# Patient Record
Sex: MALE | Race: BLACK OR AFRICAN AMERICAN | Employment: OTHER | URBAN - METROPOLITAN AREA
[De-identification: names, ages, dates, MRNs, and addresses within clinical notes are randomized per-mention and may not be internally consistent; named-entity substitution may affect disease eponyms.]

---

## 2017-12-30 ENCOUNTER — APPOINTMENT (OUTPATIENT)
Dept: ULTRASOUND IMAGING | Age: 77
End: 2017-12-30
Attending: EMERGENCY MEDICINE
Payer: MEDICARE

## 2017-12-30 ENCOUNTER — APPOINTMENT (OUTPATIENT)
Dept: GENERAL RADIOLOGY | Age: 77
End: 2017-12-30
Attending: EMERGENCY MEDICINE
Payer: MEDICARE

## 2017-12-30 ENCOUNTER — HOSPITAL ENCOUNTER (EMERGENCY)
Age: 77
Discharge: HOME | End: 2017-12-30
Attending: EMERGENCY MEDICINE
Payer: MEDICARE

## 2017-12-30 VITALS
BODY MASS INDEX: 25.8 KG/M2 | TEMPERATURE: 101.1 F | HEART RATE: 114 BPM | HEIGHT: 72 IN | RESPIRATION RATE: 16 BRPM | SYSTOLIC BLOOD PRESSURE: 124 MMHG | OXYGEN SATURATION: 100 % | WEIGHT: 190.48 LBS | DIASTOLIC BLOOD PRESSURE: 70 MMHG

## 2017-12-30 DIAGNOSIS — K52.9 ENTERITIS: ICD-10-CM

## 2017-12-30 DIAGNOSIS — R11.2 NON-INTRACTABLE VOMITING WITH NAUSEA, UNSPECIFIED VOMITING TYPE: Primary | ICD-10-CM

## 2017-12-30 DIAGNOSIS — R79.89 ELEVATED LFTS: ICD-10-CM

## 2017-12-30 LAB
ALBUMIN SERPL-MCNC: 3.3 G/DL (ref 3.5–5)
ALBUMIN/GLOB SERPL: 0.8 {RATIO} (ref 1.1–2.2)
ALP SERPL-CCNC: 154 U/L (ref 45–117)
ALT SERPL-CCNC: 152 U/L (ref 12–78)
ANION GAP SERPL CALC-SCNC: 7 MMOL/L (ref 5–15)
APPEARANCE UR: ABNORMAL
AST SERPL-CCNC: 319 U/L (ref 15–37)
BACTERIA URNS QL MICRO: NEGATIVE /HPF
BASOPHILS # BLD: 0 K/UL (ref 0–0.1)
BASOPHILS NFR BLD: 0 % (ref 0–1)
BILIRUB SERPL-MCNC: 0.7 MG/DL (ref 0.2–1)
BILIRUB UR QL CFM: NEGATIVE
BUN SERPL-MCNC: 19 MG/DL (ref 6–20)
BUN/CREAT SERPL: 9 (ref 12–20)
CALCIUM SERPL-MCNC: 8.9 MG/DL (ref 8.5–10.1)
CHLORIDE SERPL-SCNC: 108 MMOL/L (ref 97–108)
CK MB CFR SERPL CALC: 1.1 % (ref 0–2.5)
CK MB SERPL-MCNC: 1.8 NG/ML (ref 5–25)
CK SERPL-CCNC: 168 U/L (ref 39–308)
CO2 SERPL-SCNC: 27 MMOL/L (ref 21–32)
COLOR UR: ABNORMAL
CREAT SERPL-MCNC: 2.08 MG/DL (ref 0.7–1.3)
DIFFERENTIAL METHOD BLD: ABNORMAL
EOSINOPHIL # BLD: 0 K/UL (ref 0–0.4)
EOSINOPHIL NFR BLD: 0 % (ref 0–7)
EPITH CASTS URNS QL MICRO: ABNORMAL /LPF
ERYTHROCYTE [DISTWIDTH] IN BLOOD BY AUTOMATED COUNT: 14.4 % (ref 11.5–14.5)
GLOBULIN SER CALC-MCNC: 3.9 G/DL (ref 2–4)
GLUCOSE SERPL-MCNC: 120 MG/DL (ref 65–100)
GLUCOSE UR STRIP.AUTO-MCNC: NEGATIVE MG/DL
HCT VFR BLD AUTO: 35.2 % (ref 36.6–50.3)
HGB BLD-MCNC: 12 G/DL (ref 12.1–17)
HGB UR QL STRIP: NEGATIVE
HYALINE CASTS URNS QL MICRO: ABNORMAL /LPF (ref 0–5)
KETONES UR QL STRIP.AUTO: NEGATIVE MG/DL
LACTATE SERPL-SCNC: 0.9 MMOL/L (ref 0.4–2)
LACTATE SERPL-SCNC: 3.1 MMOL/L (ref 0.4–2)
LEUKOCYTE ESTERASE UR QL STRIP.AUTO: NEGATIVE
LIPASE SERPL-CCNC: 279 U/L (ref 73–393)
LYMPHOCYTES # BLD: 0.3 K/UL (ref 0.8–3.5)
LYMPHOCYTES NFR BLD: 4 % (ref 12–49)
MCH RBC QN AUTO: 29.9 PG (ref 26–34)
MCHC RBC AUTO-ENTMCNC: 34.1 G/DL (ref 30–36.5)
MCV RBC AUTO: 87.8 FL (ref 80–99)
MONOCYTES # BLD: 0.4 K/UL (ref 0–1)
MONOCYTES NFR BLD: 5 % (ref 5–13)
NEUTS SEG # BLD: 7.3 K/UL (ref 1.8–8)
NEUTS SEG NFR BLD: 91 % (ref 32–75)
NITRITE UR QL STRIP.AUTO: NEGATIVE
PH UR STRIP: 7 [PH] (ref 5–8)
PLATELET # BLD AUTO: 192 K/UL (ref 150–400)
POTASSIUM SERPL-SCNC: 4.2 MMOL/L (ref 3.5–5.1)
PROT SERPL-MCNC: 7.2 G/DL (ref 6.4–8.2)
PROT UR STRIP-MCNC: 30 MG/DL
RBC # BLD AUTO: 4.01 M/UL (ref 4.1–5.7)
RBC #/AREA URNS HPF: ABNORMAL /HPF (ref 0–5)
RBC MORPH BLD: ABNORMAL
SODIUM SERPL-SCNC: 142 MMOL/L (ref 136–145)
SP GR UR REFRACTOMETRY: 1.02 (ref 1–1.03)
TROPONIN I SERPL-MCNC: <0.04 NG/ML
UA: UC IF INDICATED,UAUC: ABNORMAL
UROBILINOGEN UR QL STRIP.AUTO: 1 EU/DL (ref 0.2–1)
WBC # BLD AUTO: 8 K/UL (ref 4.1–11.1)
WBC URNS QL MICRO: ABNORMAL /HPF (ref 0–4)

## 2017-12-30 PROCEDURE — 99285 EMERGENCY DEPT VISIT HI MDM: CPT

## 2017-12-30 PROCEDURE — 87040 BLOOD CULTURE FOR BACTERIA: CPT | Performed by: EMERGENCY MEDICINE

## 2017-12-30 PROCEDURE — 93005 ELECTROCARDIOGRAM TRACING: CPT

## 2017-12-30 PROCEDURE — 83605 ASSAY OF LACTIC ACID: CPT | Performed by: EMERGENCY MEDICINE

## 2017-12-30 PROCEDURE — 76705 ECHO EXAM OF ABDOMEN: CPT

## 2017-12-30 PROCEDURE — 96361 HYDRATE IV INFUSION ADD-ON: CPT

## 2017-12-30 PROCEDURE — 36415 COLL VENOUS BLD VENIPUNCTURE: CPT | Performed by: EMERGENCY MEDICINE

## 2017-12-30 PROCEDURE — 84484 ASSAY OF TROPONIN QUANT: CPT | Performed by: EMERGENCY MEDICINE

## 2017-12-30 PROCEDURE — 87077 CULTURE AEROBIC IDENTIFY: CPT | Performed by: EMERGENCY MEDICINE

## 2017-12-30 PROCEDURE — 80053 COMPREHEN METABOLIC PANEL: CPT | Performed by: EMERGENCY MEDICINE

## 2017-12-30 PROCEDURE — 83690 ASSAY OF LIPASE: CPT | Performed by: EMERGENCY MEDICINE

## 2017-12-30 PROCEDURE — 96374 THER/PROPH/DIAG INJ IV PUSH: CPT

## 2017-12-30 PROCEDURE — 81001 URINALYSIS AUTO W/SCOPE: CPT | Performed by: EMERGENCY MEDICINE

## 2017-12-30 PROCEDURE — 74011250636 HC RX REV CODE- 250/636: Performed by: EMERGENCY MEDICINE

## 2017-12-30 PROCEDURE — 82550 ASSAY OF CK (CPK): CPT | Performed by: EMERGENCY MEDICINE

## 2017-12-30 PROCEDURE — 85025 COMPLETE CBC W/AUTO DIFF WBC: CPT | Performed by: EMERGENCY MEDICINE

## 2017-12-30 PROCEDURE — 71010 XR CHEST PORT: CPT

## 2017-12-30 PROCEDURE — 87186 SC STD MICRODIL/AGAR DIL: CPT | Performed by: EMERGENCY MEDICINE

## 2017-12-30 PROCEDURE — 74011250637 HC RX REV CODE- 250/637: Performed by: EMERGENCY MEDICINE

## 2017-12-30 RX ORDER — LEVOFLOXACIN 500 MG/1
500 TABLET, FILM COATED ORAL
Status: COMPLETED | OUTPATIENT
Start: 2017-12-30 | End: 2017-12-30

## 2017-12-30 RX ORDER — CITALOPRAM 10 MG/1
10 TABLET ORAL EVERY EVENING
Status: ON HOLD | COMMUNITY
End: 2021-01-01 | Stop reason: SDUPTHER

## 2017-12-30 RX ORDER — ONDANSETRON 2 MG/ML
4 INJECTION INTRAMUSCULAR; INTRAVENOUS
Status: COMPLETED | OUTPATIENT
Start: 2017-12-30 | End: 2017-12-30

## 2017-12-30 RX ORDER — METRONIDAZOLE 250 MG/1
500 TABLET ORAL
Status: COMPLETED | OUTPATIENT
Start: 2017-12-30 | End: 2017-12-30

## 2017-12-30 RX ORDER — SODIUM CHLORIDE 9 MG/ML
1500 INJECTION, SOLUTION INTRAVENOUS CONTINUOUS
Status: DISCONTINUED | OUTPATIENT
Start: 2017-12-30 | End: 2017-12-30

## 2017-12-30 RX ORDER — RANITIDINE 150 MG/1
150 TABLET, FILM COATED ORAL DAILY
Status: ON HOLD | COMMUNITY
End: 2021-01-01

## 2017-12-30 RX ORDER — ACETAMINOPHEN 500 MG
1000 TABLET ORAL ONCE
Status: COMPLETED | OUTPATIENT
Start: 2017-12-30 | End: 2017-12-30

## 2017-12-30 RX ORDER — ONDANSETRON 4 MG/1
4 TABLET, ORALLY DISINTEGRATING ORAL
Qty: 20 TAB | Refills: 0 | Status: SHIPPED | OUTPATIENT
Start: 2017-12-30 | End: 2019-05-05

## 2017-12-30 RX ORDER — SODIUM CHLORIDE 9 MG/ML
1500 INJECTION, SOLUTION INTRAVENOUS ONCE
Status: DISCONTINUED | OUTPATIENT
Start: 2017-12-30 | End: 2017-12-31 | Stop reason: HOSPADM

## 2017-12-30 RX ORDER — CIPROFLOXACIN 250 MG/1
250 TABLET, FILM COATED ORAL EVERY 12 HOURS
Qty: 20 TAB | Refills: 0 | Status: SHIPPED | OUTPATIENT
Start: 2017-12-30 | End: 2019-05-05

## 2017-12-30 RX ORDER — SODIUM CHLORIDE 0.9 % (FLUSH) 0.9 %
5-10 SYRINGE (ML) INJECTION AS NEEDED
Status: DISCONTINUED | OUTPATIENT
Start: 2017-12-30 | End: 2017-12-31 | Stop reason: HOSPADM

## 2017-12-30 RX ORDER — METRONIDAZOLE 500 MG/1
500 TABLET ORAL 2 TIMES DAILY
Qty: 20 TAB | Refills: 0 | Status: SHIPPED | OUTPATIENT
Start: 2017-12-30 | End: 2018-01-09

## 2017-12-30 RX ORDER — CARBIDOPA AND LEVODOPA 50; 200 MG/1; MG/1
1 TABLET, EXTENDED RELEASE ORAL DAILY
COMMUNITY
End: 2019-05-10

## 2017-12-30 RX ADMIN — SODIUM CHLORIDE 1000 ML: 900 INJECTION, SOLUTION INTRAVENOUS at 16:43

## 2017-12-30 RX ADMIN — LEVOFLOXACIN 500 MG: 500 TABLET, FILM COATED ORAL at 21:44

## 2017-12-30 RX ADMIN — ACETAMINOPHEN 1000 MG: 500 TABLET ORAL at 17:07

## 2017-12-30 RX ADMIN — ONDANSETRON 4 MG: 2 INJECTION INTRAMUSCULAR; INTRAVENOUS at 17:07

## 2017-12-30 RX ADMIN — SODIUM CHLORIDE 1500 ML: 900 INJECTION, SOLUTION INTRAVENOUS at 18:57

## 2017-12-30 RX ADMIN — METRONIDAZOLE 500 MG: 250 TABLET ORAL at 21:44

## 2017-12-30 NOTE — ED PROVIDER NOTES
EMERGENCY DEPARTMENT HISTORY AND PHYSICAL EXAM      Date: 12/30/2017  Patient Name: José Miguel Blanco    History of Presenting Illness     Chief Complaint   Patient presents with    Nausea     pt reports he has been nauseated and woke up with a upset stomach, denies diarrhea, reports he gagged a few times. History Provided By: Patient and Patient's Wife    HPI: José Miguel Blanco, 69 y.o. male with PMHx significant for Parkinson's, CAD, and stroke presents to the ED with cc of abd pain on waking this morning. Pt report also reports nausea, decreased appetite, body tremors, dysuria, and difficulty urinating. Pt's wife reports pt had a fever after arriving to the ED. Pt states he does not currently have any abd pain. He denies receiving the flu shot this season. Pt denies cough, congestion, sore throat, SOB, rash, diarrhea, or constipation. He denies PMhx of UTI or abd issues. PCP: Carlota Black MD  Urologist: Adam Parnell M.D    There are no other complaints, changes, or physical findings at this time. Current Facility-Administered Medications   Medication Dose Route Frequency Provider Last Rate Last Dose    sodium chloride (NS) flush 5-10 mL  5-10 mL IntraVENous PRN Geryl Cynthia, DO        0.9% sodium chloride infusion 1,500 mL  1,500 mL IntraVENous ONCE Geryl Cynthia, DO        metroNIDAZOLE (FLAGYL) tablet 500 mg  500 mg Oral NOW Geryl Cynthia, DO        levoFLOXacin [facility]) tablet 500 mg  500 mg Oral NOW Geryl Cynthia, DO         Current Outpatient Prescriptions   Medication Sig Dispense Refill    carbidopa-levodopa ER (SINEMET CR)  mg per tablet Take 1 Tab by mouth two (2) times a day.  citalopram (CELEXA) 10 mg tablet Take  by mouth daily.  raNITIdine (ZANTAC) 150 mg tablet Take 150 mg by mouth nightly.  ciprofloxacin HCl (CIPRO) 250 mg tablet Take 1 Tab by mouth every twelve (12) hours.  20 Tab 0    metroNIDAZOLE (FLAGYL) 500 mg tablet Take 1 Tab by mouth two (2) times a day for 10 days. 20 Tab 0    ondansetron (ZOFRAN ODT) 4 mg disintegrating tablet Take 1 Tab by mouth every eight (8) hours as needed for Nausea. 20 Tab 0    amLODIPine (NORVASC) 10 mg tablet Take  by mouth daily.  metoprolol succinate (TOPROL-XL) 100 mg XL tablet Take  by mouth daily.  pravastatin (PRAVACHOL) 20 mg tablet Take 20 mg by mouth nightly.  dipyridamole-aspirin (AGGRENOX) 200-25 mg per SR capsule Take 1 Cap by mouth two (2) times a day.  aspirin 81 mg chewable tablet Take 81 mg by mouth daily.  donepezil (ARICEPT) 10 mg tablet Take 10 mg by mouth nightly. Past History     Past Medical History:  Past Medical History:   Diagnosis Date    CAD (coronary artery disease)     Hypertension     Other ill-defined conditions     parkinson's    Other ill-defined conditions     high cholesterol    Stroke ([facility] [address] 2006    left sided weakness       Past Surgical History:  Past Surgical History:   Procedure Laterality Date    COLONOSCOPY,DIAGNOSTIC  5/12/2014         HX CATARACT REMOVAL Bilateral     HX HEART CATHETERIZATION  2006       Family History:  No family history on file. Social History:  Social History   Substance Use Topics    Smoking status: Former Smoker    Smokeless tobacco: Not on file    Alcohol use No       Allergies:  No Known Allergies      Review of Systems   Review of Systems   Constitutional: Positive for appetite change (decrease) and fever. Negative for chills and fatigue. HENT: Negative. Negative for congestion, rhinorrhea, sinus pressure and sore throat. Eyes: Negative. Respiratory: Negative. Negative for cough, choking, chest tightness, shortness of breath and wheezing. Cardiovascular: Negative. Negative for chest pain, palpitations and leg swelling. Gastrointestinal: Positive for abdominal pain (now resolved) and nausea. Negative for constipation, diarrhea and vomiting. Endocrine: Negative.     Genitourinary: Positive for difficulty urinating and dysuria. Negative for flank pain and urgency. Musculoskeletal: Negative. Skin: Negative. Neurological: Positive for tremors. Negative for dizziness, speech difficulty, weakness, light-headedness, numbness and headaches. Psychiatric/Behavioral: Negative. All other systems reviewed and are negative. Physical Exam   Physical Exam   Constitutional: He is oriented to person, place, and time. He appears well-developed and well-nourished. No distress. Thin, elderly male, NAD   HENT:   Head: Normocephalic and atraumatic. Mouth/Throat: Oropharynx is clear and moist. No oropharyngeal exudate. Eyes: Conjunctivae and EOM are normal. Pupils are equal, round, and reactive to light. Neck: Normal range of motion. Neck supple. No JVD present. No tracheal deviation present. Cardiovascular: Normal rate, regular rhythm, normal heart sounds and intact distal pulses. No murmur heard. Pulmonary/Chest: Effort normal and breath sounds normal. No stridor. No respiratory distress. He has no wheezes. He has no rales. He exhibits no tenderness. Abdominal: Soft. He exhibits no distension. There is tenderness (mild periumbilical). There is no rebound and no guarding. Neq McBurney; neg Rovsing, no tenderness of the RUQ   Musculoskeletal: Normal range of motion. He exhibits no edema or tenderness. Neurological: He is alert and oriented to person, place, and time. No cranial nerve deficit. No gross motor or sensory deficits    Skin: Skin is warm and dry. He is not diaphoretic. Psychiatric: He has a normal mood and affect. His behavior is normal.   Nursing note and vitals reviewed.         Diagnostic Study Results     Labs -     Recent Results (from the past 12 hour(s))   EKG, 12 LEAD, INITIAL    Collection Time: 12/30/17  4:34 PM   Result Value Ref Range    Ventricular Rate 101 BPM    Atrial Rate 101 BPM    P-R Interval 172 ms    QRS Duration 84 ms    Q-T Interval 320 ms    QTC Calculation (Bezet) 414 ms    Calculated P Axis 51 degrees    Calculated R Axis 8 degrees    Calculated T Axis 135 degrees    Diagnosis       Sinus tachycardia  Possible Left atrial enlargement  T wave abnormality, consider inferior ischemia  T wave abnormality, consider anterolateral ischemia  Abnormal ECG  No previous ECGs available     LACTIC ACID    Collection Time: 12/30/17  4:38 PM   Result Value Ref Range    Lactic acid 3.1 (HH) 0.4 - 2.0 MMOL/L   METABOLIC PANEL, COMPREHENSIVE    Collection Time: 12/30/17  4:38 PM   Result Value Ref Range    Sodium 142 136 - 145 mmol/L    Potassium 4.2 3.5 - 5.1 mmol/L    Chloride 108 97 - 108 mmol/L    CO2 27 21 - 32 mmol/L    Anion gap 7 5 - 15 mmol/L    Glucose 120 (H) 65 - 100 mg/dL    BUN 19 6 - 20 MG/DL    Creatinine 2.08 (H) 0.70 - 1.30 MG/DL    BUN/Creatinine ratio 9 (L) 12 - 20      GFR est AA 38 (L) >60 ml/min/1.73m2    GFR est non-AA 31 (L) >60 ml/min/1.73m2    Calcium 8.9 8.5 - 10.1 MG/DL    Bilirubin, total 0.7 0.2 - 1.0 MG/DL    ALT (SGPT) 152 (H) 12 - 78 U/L    AST (SGOT) 319 (H) 15 - 37 U/L    Alk. phosphatase 154 (H) 45 - 117 U/L    Protein, total 7.2 6.4 - 8.2 g/dL    Albumin 3.3 (L) 3.5 - 5.0 g/dL    Globulin 3.9 2.0 - 4.0 g/dL    A-G Ratio 0.8 (L) 1.1 - 2.2     CBC WITH AUTOMATED DIFF    Collection Time: 12/30/17  4:38 PM   Result Value Ref Range    WBC 8.0 4.1 - 11.1 K/uL    RBC 4.01 (L) 4.10 - 5.70 M/uL    HGB 12.0 (L) 12.1 - 17.0 g/dL    HCT 35.2 (L) 36.6 - 50.3 %    MCV 87.8 80.0 - 99.0 FL    MCH 29.9 26.0 - 34.0 PG    MCHC 34.1 30.0 - 36.5 g/dL    RDW 14.4 11.5 - 14.5 %    PLATELET 459 080 - 918 K/uL    NEUTROPHILS 91 (H) 32 - 75 %    LYMPHOCYTES 4 (L) 12 - 49 %    MONOCYTES 5 5 - 13 %    EOSINOPHILS 0 0 - 7 %    BASOPHILS 0 0 - 1 %    ABS. NEUTROPHILS 7.3 1.8 - 8.0 K/UL    ABS. LYMPHOCYTES 0.3 (L) 0.8 - 3.5 K/UL    ABS. MONOCYTES 0.4 0.0 - 1.0 K/UL    ABS. EOSINOPHILS 0.0 0.0 - 0.4 K/UL    ABS.  BASOPHILS 0.0 0.0 - 0.1 K/UL    DF SMEAR SCANNED RBC COMMENTS NORMOCYTIC, NORMOCHROMIC     LIPASE    Collection Time: 12/30/17  4:38 PM   Result Value Ref Range    Lipase 279 73 - 393 U/L   CK W/ CKMB & INDEX    Collection Time: 12/30/17  4:38 PM   Result Value Ref Range     39 - 308 U/L    CK - MB 1.8 <3.6 NG/ML    CK-MB Index 1.1 0 - 2.5     TROPONIN I    Collection Time: 12/30/17  4:38 PM   Result Value Ref Range    Troponin-I, Qt. <0.04 <0.05 ng/mL   URINALYSIS W/ REFLEX CULTURE    Collection Time: 12/30/17  5:54 PM   Result Value Ref Range    Color YELLOW/STRAW      Appearance CLOUDY (A) CLEAR      Specific gravity 1.018 1.003 - 1.030      pH (UA) 7.0 5.0 - 8.0      Protein 30 (A) NEG mg/dL    Glucose NEGATIVE  NEG mg/dL    Ketone NEGATIVE  NEG mg/dL    Blood NEGATIVE  NEG      Urobilinogen 1.0 0.2 - 1.0 EU/dL    Nitrites NEGATIVE  NEG      Leukocyte Esterase NEGATIVE  NEG      WBC 0-4 0 - 4 /hpf    RBC 0-5 0 - 5 /hpf    Epithelial cells FEW FEW /lpf    Bacteria NEGATIVE  NEG /hpf    UA:UC IF INDICATED CULTURE NOT INDICATED BY UA RESULT CNI      Hyaline cast 0-2 0 - 5 /lpf   BILIRUBIN, CONFIRM    Collection Time: 12/30/17  5:54 PM   Result Value Ref Range    Bilirubin UA, confirm NEGATIVE  NEG     LACTIC ACID    Collection Time: 12/30/17  8:29 PM   Result Value Ref Range    Lactic acid 0.9 0.4 - 2.0 MMOL/L       Radiologic Studies -   CXR Results  (Last 48 hours)               12/30/17 1725  XR CHEST PORT Final result    Impression:  IMPRESSION: No airspace disease or other acute abnormality. Narrative:  EXAM:  XR CHEST PORT. INDICATION: Sepsis. COMPARISON: None. FINDINGS:    A portable AP radiograph of the chest was obtained at 1706 hours. Lines and tubes: None. Lungs: The lungs are clear. Pleura: There are some right pleural calcifications. There is no pleural   effusion or pneumothorax. Mediastinum: The cardiac and mediastinal contours and pulmonary vascularity are   normal.   Bones and soft tissues:  The bones and soft tissues are grossly within normal   limits. Study Result      ULTRASOUND OF ABDOMEN, 12/30/2017 7:57 PM  INDICATION:   Additional history: Pain, nausea, elevated LFTs, fever. COMPARISON: None  . TECHNIQUE: Multiplanar real-time ultrasonography of the abdomen using gray-scale  imaging, supplemented by color and spectral Doppler. Thong Wheeler FINDINGS:  Liver: Normal contour without visible focal lesions. The liver echotexture is  normal. Antegrade flow in the portal vein with normal waveform. Gallbladder: No stones. Possible minimal degree of pericholecystic fluid,  nonspecific No sonographic Vargas's sign. Biliary: No intra- or extra-hepatic ductal dilatation. Common bile duct measures  0.5 cm. Pancreas: The visualized pancreas is unremarkable. Right kidney: Normal size, contour and echogenicity without masses, stones,  perinephric fluid, or hydronephrosis. Right kidney measures 8.8 cm. Vascular: The proximal aorta and IVC are unremarkable. Thong Wheeler IMPRESSION  IMPRESSION:    1. Possible minimal degree of pericholecystic fluid which is a nonspecific  finding. Medical Decision Making   I am the first provider for this patient. I reviewed the vital signs, available nursing notes, past medical history, past surgical history, family history and social history. Vital Signs-Reviewed the patient's vital signs.   Patient Vitals for the past 12 hrs:   Temp Pulse Resp BP SpO2   12/30/17 2045 - - - 127/67 98 %   12/30/17 2030 - - - 135/66 100 %   12/30/17 2015 - - - 138/73 100 %   12/30/17 2002 - - - 131/66 -   12/30/17 1930 - - - 129/65 100 %   12/30/17 1915 - - - 135/74 100 %   12/30/17 1900 - - - 126/61 99 %   12/30/17 1845 - - - 124/63 99 %   12/30/17 1830 - - - 125/59 100 %   12/30/17 1815 - - - 125/64 100 %   12/30/17 1800 - - - 132/62 100 %   12/30/17 1745 - - - 122/64 99 %   12/30/17 1730 - - - 155/73 100 %   12/30/17 1715 - - - 141/65 100 %   12/30/17 1700 - - - 139/60 100 %   12/30/17 9302 - - - 139/65 100 %   12/30/17 1630 - - - 128/62 100 %   12/30/17 1628 - - - - 100 %   12/30/17 1618 - - - - 100 %   12/30/17 1615 - - - 138/72 100 %   12/30/17 1607 - - - 150/78 -   12/30/17 1602 (!) 101.1 °F (38.4 °C) (!) 114 16 150/78 100 %       EKG interpretation: (Preliminary)  Sinus tach, rate 103, normal axis/pr/ rsDiffuse t wave inversion, Derinda DO Miranda      Records Reviewed: Old Medical Records    Provider Notes (Medical Decision Making):   DDx: enteritis, PNA, UTI, cholecystitis    ED Course:   Initial assessment performed. The patients presenting problems have been discussed, and they are in agreement with the care plan formulated and outlined with them. I have encouraged them to ask questions as they arise throughout their visit. Progress Note:  8:50 PM  Pt reports no tenderness over gallbladder or abd. Will ambulate pt. If he ambulates fine, will consult with general surgery and discharge home with antibiotics and a follow up with PCP. Consult Note:  9:25 PM  Juan Brothers DO spoke with Kerwin Leblanc MD  Specialty: General Surgery  Discussed pt's hx, disposition, and available diagnostic and imaging results. Dr. Mark Raza states that fluid around pt's gallbladder is not likely related to the gallbladder given findings. He believes itis likely due  to pt's enteritis. He states if pt can tolerate PO, pt can be discharged home. 9:30  Pt tolerated PO. His lactic is back to 0.9      Disposition:  Discharge Note:  9:33 PM  The pt is ready for discharge. The pt's signs, symptoms, diagnosis, and discharge instructions have been discussed and pt has conveyed their understanding. The pt is to follow up as recommended or return to ER should their symptoms worsen. Plan has been discussed and pt is in agreement. PLAN:  1. Current Discharge Medication List      START taking these medications    Details   ciprofloxacin HCl (CIPRO) 250 mg tablet Take 1 Tab by mouth every twelve (12) hours.   Qty: 20 Tab, Refills: 0      metroNIDAZOLE (FLAGYL) 500 mg tablet Take 1 Tab by mouth two (2) times a day for 10 days. Qty: 20 Tab, Refills: 0      ondansetron (ZOFRAN ODT) 4 mg disintegrating tablet Take 1 Tab by mouth every eight (8) hours as needed for Nausea. Qty: 20 Tab, Refills: 0           2. Follow-up Information     Follow up With Details Comments [address], [place] In 1 week Repeat Labs [address]  [phone]      [facility] EMERGENCY DEPT  If symptoms worsen [address] [place] [address]  [phone]        Return to ED if worse     Diagnosis     Clinical Impression:   1. Non-intractable vomiting with nausea, unspecified vomiting type    2. Elevated LFTs    3. Enteritis        Attestations: This note is prepared by Jacqulynn Boeck, acting as a Scribe for Hellen Miller, [facility], DO: The scribe's documentation has been prepared under my direction and personally reviewed by me in its entirety. I confirm that the notes above accurately reflects all work, treatment, procedures, and medical decision making performed by me.

## 2017-12-30 NOTE — ED NOTES
Family members at bedside. Reports pt woke up this morning with pain in ABD that subsided. Pt then was shaking all over lasting 15-20 minutes.

## 2017-12-30 NOTE — ED TRIAGE NOTES
Received care of pt from EMS. Pt is alert and oriented x 4. Denies pain. Woke up this morning with upset stomach, reports he gagged afew times but not fully vomited. Denies nausea at time. Denies CP or ABD pain.

## 2017-12-31 LAB
ATRIAL RATE: 101 BPM
CALCULATED P AXIS, ECG09: 51 DEGREES
CALCULATED R AXIS, ECG10: 8 DEGREES
CALCULATED T AXIS, ECG11: 135 DEGREES
DIAGNOSIS, 93000: NORMAL
P-R INTERVAL, ECG05: 172 MS
Q-T INTERVAL, ECG07: 320 MS
QRS DURATION, ECG06: 84 MS
QTC CALCULATION (BEZET), ECG08: 414 MS
VENTRICULAR RATE, ECG03: 101 BPM

## 2017-12-31 NOTE — ED NOTES
Bedside and Verbal shift change received from [facility], RN. Report included the following information: SBAR, ED Summary, MAR and Recent Results.

## 2017-12-31 NOTE — DISCHARGE INSTRUCTIONS
Gastroenteritis: Care Instructions  Your Care Instructions    Gastroenteritis is an illness that may cause nausea, vomiting, and diarrhea. It is sometimes called \"stomach flu. \" It can be caused by bacteria or a virus. You will probably begin to feel better in 1 to 2 days. In the meantime, get plenty of rest and make sure you do not become dehydrated. Dehydration occurs when your body loses too much fluid. Follow-up care is a key part of your treatment and safety. Be sure to make and go to all appointments, and call your doctor if you are having problems. It's also a good idea to know your test results and keep a list of the medicines you take. How can you care for yourself at home? · If your doctor prescribed antibiotics, take them as directed. Do not stop taking them just because you feel better. You need to take the full course of antibiotics. · Drink plenty of fluids to prevent dehydration, enough so that your urine is light yellow or clear like water. Choose water and other caffeine-free clear liquids until you feel better. If you have kidney, heart, or liver disease and have to limit fluids, talk with your doctor before you increase your fluid intake. · Drink fluids slowly, in frequent, small amounts, because drinking too much too fast can cause vomiting. · Begin eating mild foods, such as dry toast, yogurt, applesauce, bananas, and rice. Avoid spicy, hot, or high-fat foods, and do not drink alcohol or caffeine for a day or two. Do not drink milk or eat ice cream until you are feeling better. How to prevent gastroenteritis  · Keep hot foods hot and cold foods cold. · Do not eat meats, dressings, salads, or other foods that have been kept at room temperature for more than 2 hours. · Use a thermometer to check your refrigerator. It should be between 34°F and 40°F.  · Defrost meats in the refrigerator or microwave, not on the kitchen counter. · Keep your hands and your kitchen clean.  Wash your hands, cutting boards, and countertops with hot soapy water frequently. · Cook meat until it is well done. · Do not eat raw eggs or uncooked sauces made with raw eggs. · Do not take chances. If food looks or tastes spoiled, throw it out. When should you call for help? Call 911 anytime you think you may need emergency care. For example, call if:  ? · You vomit blood or what looks like coffee grounds. ? · You passed out (lost consciousness). ? · You pass maroon or very bloody stools. ?Call your doctor now or seek immediate medical care if:  ? · You have severe belly pain. ? · You have signs of needing more fluids. You have sunken eyes, a dry mouth, and pass only a little dark urine. ? · You feel like you are going to faint. ? · You have increased belly pain that does not go away in 1 to 2 days. ? · You have new or increased nausea, or you are vomiting. ? · You have a new or higher fever. ? · Your stools are black and tarlike or have streaks of blood. ? Watch closely for changes in your health, and be sure to contact your doctor if:  ? · You are dizzy or lightheaded. ? · You urinate less than usual, or your urine is dark yellow or brown. ? · You do not feel better with each day that goes by. Where can you learn more? Go to http://lorenzo-sofia.info/. Enter N142 in the search box to learn more about \"Gastroenteritis: Care Instructions. \"  Current as of: March 3, 2017  Content Version: 11.4  © 2286-6859 [employer]. Care instructions adapted under license by [employer] (which disclaims liability or warranty for this information). If you have questions about a medical condition or this instruction, always ask your healthcare professional. [address] any warranty or liability for your use of this information.

## 2017-12-31 NOTE — ED NOTES
Discharge instructions reviewed with patient. Discharge instructions given to patient per Dr. Merlene Acosta. Patient able to return/verbalize discharge instructions. Copy of discharge instructions provided. Patient condition stable, respiratory status within normal limits, neuro status intact. Escorted by wheelchair out of ER, accompanied by family.

## 2017-12-31 NOTE — ED NOTES
Bedside shift change report given to John Craig 45 (oncoming nurse) by Brandi Cueva RN (offgoing nurse). Report included the following information SBAR, Kardex, ED Summary and MAR.

## 2017-12-31 NOTE — PROGRESS NOTES
Pt discharged on Cipro and Flagyl. Only growing in 1 of 2 bottles.   Final results and sensitivities pending

## 2018-01-05 LAB
BACTERIA SPEC CULT: ABNORMAL
BACTERIA SPEC CULT: ABNORMAL
BACTERIA SPEC CULT: NORMAL
SERVICE CMNT-IMP: ABNORMAL
SERVICE CMNT-IMP: NORMAL

## 2018-01-05 NOTE — PROGRESS NOTES
Attempted to call pt and left HIPAA compliant VM requesting a return call. Growth in only 1 bottle since 12/30/2017. Susceptibility report shows that bacteria susceptible to cipro.    Nii Live PA-C

## 2018-01-06 NOTE — PROGRESS NOTES
Suspect contaminant, as only grown in 1 bottle. If patient returns call and is symptomatic, will offer Cipro.

## 2018-02-10 ENCOUNTER — HOSPITAL ENCOUNTER (EMERGENCY)
Age: 78
Discharge: HOME | End: 2018-02-10
Attending: EMERGENCY MEDICINE
Payer: MEDICARE

## 2018-02-10 ENCOUNTER — APPOINTMENT (OUTPATIENT)
Dept: GENERAL RADIOLOGY | Age: 78
End: 2018-02-10
Attending: PHYSICIAN ASSISTANT
Payer: MEDICARE

## 2018-02-10 VITALS
BODY MASS INDEX: 23.05 KG/M2 | RESPIRATION RATE: 14 BRPM | HEART RATE: 71 BPM | WEIGHT: 170.19 LBS | DIASTOLIC BLOOD PRESSURE: 97 MMHG | OXYGEN SATURATION: 98 % | HEIGHT: 72 IN | TEMPERATURE: 98.4 F | SYSTOLIC BLOOD PRESSURE: 173 MMHG

## 2018-02-10 DIAGNOSIS — R10.12 ABDOMINAL PAIN, LUQ (LEFT UPPER QUADRANT): Primary | ICD-10-CM

## 2018-02-10 LAB
APPEARANCE UR: CLEAR
BACTERIA URNS QL MICRO: NEGATIVE /HPF
BILIRUB UR QL CFM: NEGATIVE
COLOR UR: ABNORMAL
EPITH CASTS URNS QL MICRO: ABNORMAL /LPF
GLUCOSE UR STRIP.AUTO-MCNC: NEGATIVE MG/DL
HGB UR QL STRIP: NEGATIVE
HYALINE CASTS URNS QL MICRO: ABNORMAL /LPF (ref 0–5)
KETONES UR QL STRIP.AUTO: NEGATIVE MG/DL
LEUKOCYTE ESTERASE UR QL STRIP.AUTO: ABNORMAL
NITRITE UR QL STRIP.AUTO: NEGATIVE
PH UR STRIP: 7.5 [PH] (ref 5–8)
PROT UR STRIP-MCNC: 30 MG/DL
RBC #/AREA URNS HPF: ABNORMAL /HPF (ref 0–5)
SP GR UR REFRACTOMETRY: 1.02 (ref 1–1.03)
UA: UC IF INDICATED,UAUC: ABNORMAL
UROBILINOGEN UR QL STRIP.AUTO: 0.2 EU/DL (ref 0.2–1)
WBC URNS QL MICRO: ABNORMAL /HPF (ref 0–4)

## 2018-02-10 PROCEDURE — 81001 URINALYSIS AUTO W/SCOPE: CPT | Performed by: PHYSICIAN ASSISTANT

## 2018-02-10 PROCEDURE — 71046 X-RAY EXAM CHEST 2 VIEWS: CPT

## 2018-02-10 PROCEDURE — 74018 RADEX ABDOMEN 1 VIEW: CPT

## 2018-02-10 PROCEDURE — 99283 EMERGENCY DEPT VISIT LOW MDM: CPT

## 2018-02-10 RX ORDER — POLYETHYLENE GLYCOL 3350 17 G/17G
17 POWDER, FOR SOLUTION ORAL DAILY
Qty: 238 G | Refills: 0 | Status: SHIPPED | OUTPATIENT
Start: 2018-02-10 | End: 2019-05-05 | Stop reason: DRUGHIGH

## 2018-02-10 NOTE — DISCHARGE INSTRUCTIONS
Abdominal Pain: Care Instructions  Your Care Instructions    Abdominal pain has many possible causes. Some aren't serious and get better on their own in a few days. Others need more testing and treatment. If your pain continues or gets worse, you need to be rechecked and may need more tests to find out what is wrong. You may need surgery to correct the problem. Don't ignore new symptoms, such as fever, nausea and vomiting, urination problems, pain that gets worse, and dizziness. These may be signs of a more serious problem. Your doctor may have recommended a follow-up visit in the next 8 to 12 hours. If you are not getting better, you may need more tests or treatment. The doctor has checked you carefully, but problems can develop later. If you notice any problems or new symptoms, get medical treatment right away. Follow-up care is a key part of your treatment and safety. Be sure to make and go to all appointments, and call your doctor if you are having problems. It's also a good idea to know your test results and keep a list of the medicines you take. How can you care for yourself at home? · Rest until you feel better. · To prevent dehydration, drink plenty of fluids, enough so that your urine is light yellow or clear like water. Choose water and other caffeine-free clear liquids until you feel better. If you have kidney, heart, or liver disease and have to limit fluids, talk with your doctor before you increase the amount of fluids you drink. · If your stomach is upset, eat mild foods, such as rice, dry toast or crackers, bananas, and applesauce. Try eating several small meals instead of two or three large ones. · Wait until 48 hours after all symptoms have gone away before you have spicy foods, alcohol, and drinks that contain caffeine. · Do not eat foods that are high in fat. · Avoid anti-inflammatory medicines such as aspirin, ibuprofen (Advil, Motrin), and naproxen (Aleve).  These can cause stomach upset. Talk to your doctor if you take daily aspirin for another health problem. When should you call for help? Call 911 anytime you think you may need emergency care. For example, call if:  ? · You passed out (lost consciousness). ? · You pass maroon or very bloody stools. ? · You vomit blood or what looks like coffee grounds. ? · You have new, severe belly pain. ?Call your doctor now or seek immediate medical care if:  ? · Your pain gets worse, especially if it becomes focused in one area of your belly. ? · You have a new or higher fever. ? · Your stools are black and look like tar, or they have streaks of blood. ? · You have unexpected vaginal bleeding. ? · You have symptoms of a urinary tract infection. These may include:  ¨ Pain when you urinate. ¨ Urinating more often than usual.  ¨ Blood in your urine. ? · You are dizzy or lightheaded, or you feel like you may faint. ? Watch closely for changes in your health, and be sure to contact your doctor if:  ? · You are not getting better after 1 day (24 hours). Where can you learn more? Go to http://lorenzo-sofia.info/. Enter N366 in the search box to learn more about \"Abdominal Pain: Care Instructions. \"  Current as of: March 20, 2017  Content Version: 11.4  © 2369-6255 [employer]. Care instructions adapted under license by [employer] (which disclaims liability or warranty for this information). If you have questions about a medical condition or this instruction, always ask your healthcare professional. [place] [address] any warranty or liability for your use of this information.

## 2018-02-10 NOTE — ED PROVIDER NOTES
EMERGENCY DEPARTMENT HISTORY AND PHYSICAL EXAM      Date: 2/10/2018  Patient Name: Su Barron    History of Presenting Illness     Chief Complaint   Patient presents with    Abdominal Pain     Ambulatory into the ED with c/o Lt side pain and constipation x several days. Denies n/v/d and urinary symptoms.  Constipation       History Provided By: Patient and Patient's Wife    HPI: Su Barron, 69 y.o. male with PMHx significant for CAD, HTN, Parkinson's, presents ambulatory to the ED with cc of a sharp L upper abdominal pain x 2 days. Family reports associated symptom of constpation for 1 week. Pt notes his last BM was yesterday, but describes it as hard. Family member reports the pt's pain would \"ease up some. \" Pt also complains of nausea but notes it is due to \"too much medication. \" Wife notes the pt has not been eating recently due to his h/o Parkinson's disease and she also reports his urine is discolored. Pt denies any blood in his stool, urinary discomfort, vomiting, SOB, coughing, or headache. Wife denies any recent falls or h/o blood clots. She denies the pt seeing a neurologist.    PCP: Hazle Hammans, MD    There are no other complaints, changes, or physical findings at this time. Current Outpatient Prescriptions   Medication Sig Dispense Refill    polyethylene glycol (MIRALAX) 17 gram/dose powder Take 17 g by mouth daily. 1 tablespoon with 8 oz of water daily 238 g 0    carbidopa-levodopa ER (SINEMET CR)  mg per tablet Take 1 Tab by mouth two (2) times a day.  citalopram (CELEXA) 10 mg tablet Take  by mouth daily.  raNITIdine (ZANTAC) 150 mg tablet Take 150 mg by mouth nightly.  ciprofloxacin HCl (CIPRO) 250 mg tablet Take 1 Tab by mouth every twelve (12) hours. 20 Tab 0    ondansetron (ZOFRAN ODT) 4 mg disintegrating tablet Take 1 Tab by mouth every eight (8) hours as needed for Nausea. 20 Tab 0    amLODIPine (NORVASC) 10 mg tablet Take  by mouth daily.       metoprolol succinate (TOPROL-XL) 100 mg XL tablet Take  by mouth daily.  pravastatin (PRAVACHOL) 20 mg tablet Take 20 mg by mouth nightly.  donepezil (ARICEPT) 10 mg tablet Take 10 mg by mouth nightly.  dipyridamole-aspirin (AGGRENOX) 200-25 mg per SR capsule Take 1 Cap by mouth two (2) times a day.  aspirin 81 mg chewable tablet Take 81 mg by mouth daily. Past History     Past Medical History:  Past Medical History:   Diagnosis Date    CAD (coronary artery disease)     Hypertension     Other ill-defined conditions     parkinson's    Other ill-defined conditions     high cholesterol    Stroke ([facility] [address] 2006    left sided weakness       Past Surgical History:  Past Surgical History:   Procedure Laterality Date    COLONOSCOPY,DIAGNOSTIC  5/12/2014         HX CATARACT REMOVAL Bilateral     HX HEART CATHETERIZATION  2006       Family History:  No family history on file. Social History:  Social History   Substance Use Topics    Smoking status: Former Smoker    Smokeless tobacco: Not on file    Alcohol use No       Allergies:  No Known Allergies      Review of Systems   Review of Systems   Constitutional: Negative for chills and fever. HENT: Negative for facial swelling. Eyes: Negative for photophobia and visual disturbance. Respiratory: Negative for cough and shortness of breath. Cardiovascular: Negative for chest pain. Gastrointestinal: Positive for abdominal pain (+LUQ), constipation and nausea. Negative for blood in stool and vomiting. Genitourinary: Negative for dysuria and flank pain. Skin: Negative for color change, pallor, rash and wound. Neurological: Negative for dizziness, weakness, light-headedness and headaches. All other systems reviewed and are negative. Physical Exam   Physical Exam   Constitutional: He is oriented to person, place, and time. He appears well-developed and well-nourished. No distress.    HENT:   Head: Normocephalic and atraumatic. Eyes: Conjunctivae are normal.   Cardiovascular: Normal rate, regular rhythm and normal heart sounds. Pulmonary/Chest: Effort normal and breath sounds normal. No respiratory distress. Abdominal: Soft. Bowel sounds are normal. There is tenderness. Mild amount of LUQ tenderness   Musculoskeletal: Normal range of motion. TTP L lower ribs, no swelling or erythema   Neurological: He is alert and oriented to person, place, and time. No cranial nerve deficit. Skin: Skin is warm. No rash noted. Psychiatric: He has a normal mood and affect. His behavior is normal.   Nursing note and vitals reviewed. Diagnostic Study Results     Labs -  Recent Results (from the past 12 hour(s))   URINALYSIS W/ REFLEX CULTURE    Collection Time: 02/10/18 11:33 AM   Result Value Ref Range    Color YELLOW/STRAW      Appearance CLEAR CLEAR      Specific gravity 1.023 1.003 - 1.030      pH (UA) 7.5 5.0 - 8.0      Protein 30 (A) NEG mg/dL    Glucose NEGATIVE  NEG mg/dL    Ketone NEGATIVE  NEG mg/dL    Blood NEGATIVE  NEG      Urobilinogen 0.2 0.2 - 1.0 EU/dL    Nitrites NEGATIVE  NEG      Leukocyte Esterase TRACE (A) NEG      WBC 0-4 0 - 4 /hpf    RBC 0-5 0 - 5 /hpf    Epithelial cells FEW FEW /lpf    Bacteria NEGATIVE  NEG /hpf    UA:UC IF INDICATED CULTURE NOT INDICATED BY UA RESULT CNI      Hyaline cast 0-2 0 - 5 /lpf   BILIRUBIN, CONFIRM    Collection Time: 02/10/18 11:33 AM   Result Value Ref Range    Bilirubin UA, confirm NEGATIVE  NEG         Radiologic Studies -   XR ABD (KUB)   Final Result   CLINICAL HISTORY: Left upper quadrant pain     INDICATION: Upper quadrant pain     COMPARISON: None  FINDINGS:   Single AP view of the abdomen are obtained. There is no organomegaly abnormal  mass or calcification. No obstruction. No free intraperitoneal air.     IMPRESSION  IMPRESSION:  No obstruction or free intraperitoneal air.      CXR Results  (Last 48 hours)               02/10/18 1056  XR CHEST PA LAT Final result Impression:  IMPRESSION: No acute intrathoracic disease. Narrative:  CLINICAL HISTORY: Rib pain    INDICATION: Rib pain       COMPARISON: 2008       FINDINGS:    PA and lateral views of the chest are obtained. The cardiopericardial silhouette is within normal limits. There is no pleural   effusion, pneumothorax or focal consolidation present. Chronic pleural and   parenchymal changes with calcific densities adjacent to the pleural margin on   the right. On the compression deformities in the midthoracic spine. Medical Decision Making   I am the first provider for this patient. I reviewed the vital signs, available nursing notes, past medical history, past surgical history, family history and social history. Vital Signs-Reviewed the patient's vital signs. Patient Vitals for the past 12 hrs:   Temp Pulse Resp BP SpO2   02/10/18 0936 98.4 °F (36.9 °C) 71 14 (!) 173/97 98 %     Records Reviewed: Nursing Notes and Old Medical Records    Provider Notes (Medical Decision Making):   DDx: UTI, constipation, costochondritis, PTX    ED Course:   Initial assessment performed. The patients presenting problems have been discussed, and they are in agreement with the care plan formulated and outlined with them. I have encouraged them to ask questions as they arise throughout their visit. PROGRESS NOTE:  11:58 AM  Pt has been re-evaluated. Pt was updated on his results. He conveys his understanding of these results. Disposition:  12:02 PM  Discussed lab and imaging results with pt along with dx and treatment plan. Discussed importance of  neurology and PCP follow up. All questions answered. Pt voiced they understood. Return if sx worsen. PLAN:  1. Discharge  Current Discharge Medication List      START taking these medications    Details   polyethylene glycol (MIRALAX) 17 gram/dose powder Take 17 g by mouth daily.  1 tablespoon with 8 oz of water daily  Qty: 238 g, Refills: 0 2.   Follow-up Information     Follow up With Details Comments Contact Info    Lakshmi Hooper MD Schedule an appointment as soon as possible for a visit in 1 day for parkinsons follow up [place] [address]      Oneyda Steiner MD Schedule an appointment as soon as possible for a visit As needed [address] [facility],[phone]          Return to ED if worse     Diagnosis     Clinical Impression:   1. Abdominal pain, LUQ (left upper quadrant)        Attestations: This note is prepared by Tali Gee, acting as Scribe for [employer] [place] [employer]. AYE Oneal: The scribe's documentation has been prepared under my direction and personally reviewed by me in its entirety. I confirm that the note above accurately reflects all work, treatment, procedures, and medical decision making performed by me.

## 2018-04-09 ENCOUNTER — ANESTHESIA EVENT (OUTPATIENT)
Dept: ENDOSCOPY | Age: 78
End: 2018-04-09
Payer: MEDICARE

## 2018-04-09 ENCOUNTER — HOSPITAL ENCOUNTER (OUTPATIENT)
Age: 78
Setting detail: OUTPATIENT SURGERY
Discharge: HOME | End: 2018-04-09
Attending: INTERNAL MEDICINE | Admitting: INTERNAL MEDICINE
Payer: MEDICARE

## 2018-04-09 ENCOUNTER — ANESTHESIA (OUTPATIENT)
Dept: ENDOSCOPY | Age: 78
End: 2018-04-09
Payer: MEDICARE

## 2018-04-09 VITALS
HEART RATE: 75 BPM | DIASTOLIC BLOOD PRESSURE: 99 MMHG | WEIGHT: 170 LBS | HEIGHT: 72 IN | RESPIRATION RATE: 12 BRPM | SYSTOLIC BLOOD PRESSURE: 180 MMHG | TEMPERATURE: 98.4 F | BODY MASS INDEX: 23.03 KG/M2 | OXYGEN SATURATION: 96 %

## 2018-04-09 PROCEDURE — 77030019988 HC FCPS ENDOSC DISP BSC -B: Performed by: INTERNAL MEDICINE

## 2018-04-09 PROCEDURE — 77030018712 HC DEV BLLN INFL BSC -B: Performed by: INTERNAL MEDICINE

## 2018-04-09 PROCEDURE — 76040000019: Performed by: INTERNAL MEDICINE

## 2018-04-09 PROCEDURE — 88305 TISSUE EXAM BY PATHOLOGIST: CPT | Performed by: INTERNAL MEDICINE

## 2018-04-09 PROCEDURE — 74011000250 HC RX REV CODE- 250

## 2018-04-09 PROCEDURE — 74011250636 HC RX REV CODE- 250/636

## 2018-04-09 PROCEDURE — 74011250636 HC RX REV CODE- 250/636: Performed by: INTERNAL MEDICINE

## 2018-04-09 PROCEDURE — C1726 CATH, BAL DIL, NON-VASCULAR: HCPCS | Performed by: INTERNAL MEDICINE

## 2018-04-09 PROCEDURE — 76060000031 HC ANESTHESIA FIRST 0.5 HR: Performed by: INTERNAL MEDICINE

## 2018-04-09 RX ORDER — SODIUM CHLORIDE 9 MG/ML
50 INJECTION, SOLUTION INTRAVENOUS CONTINUOUS
Status: DISCONTINUED | OUTPATIENT
Start: 2018-04-09 | End: 2018-04-09 | Stop reason: HOSPADM

## 2018-04-09 RX ORDER — PROPOFOL 10 MG/ML
INJECTION, EMULSION INTRAVENOUS AS NEEDED
Status: DISCONTINUED | OUTPATIENT
Start: 2018-04-09 | End: 2018-04-09 | Stop reason: HOSPADM

## 2018-04-09 RX ORDER — LIDOCAINE HYDROCHLORIDE 20 MG/ML
INJECTION, SOLUTION EPIDURAL; INFILTRATION; INTRACAUDAL; PERINEURAL AS NEEDED
Status: DISCONTINUED | OUTPATIENT
Start: 2018-04-09 | End: 2018-04-09 | Stop reason: HOSPADM

## 2018-04-09 RX ORDER — SODIUM CHLORIDE 0.9 % (FLUSH) 0.9 %
5-10 SYRINGE (ML) INJECTION EVERY 8 HOURS
Status: DISCONTINUED | OUTPATIENT
Start: 2018-04-09 | End: 2018-04-09 | Stop reason: HOSPADM

## 2018-04-09 RX ORDER — SODIUM CHLORIDE 0.9 % (FLUSH) 0.9 %
5-10 SYRINGE (ML) INJECTION AS NEEDED
Status: DISCONTINUED | OUTPATIENT
Start: 2018-04-09 | End: 2018-04-09 | Stop reason: HOSPADM

## 2018-04-09 RX ORDER — MIDAZOLAM HYDROCHLORIDE 1 MG/ML
1-2 INJECTION, SOLUTION INTRAMUSCULAR; INTRAVENOUS
Status: DISCONTINUED | OUTPATIENT
Start: 2018-04-09 | End: 2018-04-09 | Stop reason: HOSPADM

## 2018-04-09 RX ADMIN — SODIUM CHLORIDE 50 ML/HR: 900 INJECTION, SOLUTION INTRAVENOUS at 08:36

## 2018-04-09 RX ADMIN — LIDOCAINE HYDROCHLORIDE 100 MG: 20 INJECTION, SOLUTION EPIDURAL; INFILTRATION; INTRACAUDAL; PERINEURAL at 08:47

## 2018-04-09 RX ADMIN — PROPOFOL 160 MG: 10 INJECTION, EMULSION INTRAVENOUS at 09:01

## 2018-04-09 NOTE — PROCEDURES
Endoscopic Gastroduodenoscopy Procedure Note  Thomas Ellis  1940  [id number]      Procedure: Endoscopic Gastroduodenoscopy with biopsy, esophageal dilation    Indication:  Dysphagia/odynophagia    Pre-operative Diagnosis: see indication above    Post-operative Diagnosis: see findings below    :  Christi Root MD    Referring Provider:  Carri Almendarez    Anethesia/Sedation:  MAC anesthesia Propofol    Pre-Procedural Exam:      Airway: clear, Malimpati 2   Heart: RRR, without gallops or rubs  Lungs: clear bilaterally without wheezes, crackles, or rhonchi  Abdomen: soft, nontender, nondistended, bowel sounds present        Procedure Details     After infom consent was obtained for the procedure, with all risks and benefits of procedure explained the patient was taken to the endoscopy suite and placed in the left lateral decubitus position. Following sequential administration of sedation as per above, the [id number] gastroscope was inserted into the mouth and advanced under direct vision to second portion of the duodenum. A careful inspection was made as the gastroscope was withdrawn, including a retroflexed view of the proximal stomach; findings and interventions are described below. Findings/Impression: ESOPHAGUS: The esophagus is normal. The proximal, mid, and distal portions are normal. The Z-Line is intact. STOMACH: The fundus on antegrade and retroflex views is normal. The body and antrum have chronic gastritis. pylorus is normal.   DUODENUM: The bulb and second portions are normal.    Therapies:  esophageal dilation with 20mm sized balloon  biopsy of stomach body    Specimens: stomach          Complications:   None; patient tolerated the procedure well. EBL:  None. Recommendations:  -Await pathology. , -Follow symptoms. , -Follow up with primary care physician, -esoph manometry if dysphagia persists.   It could be muscular with Parkinsons    Discharge Disposition:

## 2018-04-09 NOTE — H&P
Gastroenterology History and Physical    Patient: Mason Joe    Physician: Da Webster MD    Vital Signs: Blood pressure 161/80, pulse 73, temperature 98.4 °F (36.9 °C), resp. rate 12, height 6' (1.829 m), weight 77.1 kg (170 lb), SpO2 98 %. Allergies: No Known Allergies    Indication: dysphagia    History:  Past Medical History:   Diagnosis Date    CAD (coronary artery disease)     Hypertension     Other ill-defined conditions(799.89)     parkinson's    Other ill-defined conditions(799.89)     high cholesterol    Stroke ([facility] [address] 2006    left sided weakness      Past Surgical History:   Procedure Laterality Date    HX CATARACT REMOVAL Bilateral     HX HEART CATHETERIZATION  2006    MD COLONOSCOPY FLX DX W/COLLJ SPEC WHEN PFRMD  5/12/2014           Social History     Social History    Marital status: SINGLE     Spouse name: N/A    Number of children: N/A    Years of education: N/A     Social History Main Topics    Smoking status: Former Smoker    Smokeless tobacco: None    Alcohol use No    Drug use: None    Sexual activity: Not Asked     Other Topics Concern    None     Social History Narrative    History reviewed. No pertinent family history. Medications:   Prior to Admission medications    Medication Sig Start Date End Date Taking? Authorizing Provider   polyethylene glycol (MIRALAX) 17 gram/dose powder Take 17 g by mouth daily. 1 tablespoon with 8 oz of water daily 2/10/18  Yes ROXIE Rolon   carbidopa-levodopa ER (SINEMET CR)  mg per tablet Take 1 Tab by mouth two (2) times a day. Yes Sima Moeller MD   raNITIdine (ZANTAC) 150 mg tablet Take 150 mg by mouth nightly. Yes Sima Moeller MD   ciprofloxacin HCl (CIPRO) 250 mg tablet Take 1 Tab by mouth every twelve (12) hours. 12/30/17  Yes [facility], DO   ondansetron (ZOFRAN ODT) 4 mg disintegrating tablet Take 1 Tab by mouth every eight (8) hours as needed for Nausea.  12/30/17  Yes [facility], DO   amLODIPine (NORVASC) 10 mg tablet Take  by mouth daily. Yes Historical Provider   metoprolol succinate (TOPROL-XL) 100 mg XL tablet Take  by mouth daily. Yes Historical Provider   pravastatin (PRAVACHOL) 20 mg tablet Take 20 mg by mouth nightly. Yes Historical Provider   donepezil (ARICEPT) 10 mg tablet Take 10 mg by mouth nightly. Yes Historical Provider   dipyridamole-aspirin (AGGRENOX) 200-25 mg per SR capsule Take 1 Cap by mouth two (2) times a day. Yes Historical Provider   aspirin 81 mg chewable tablet Take 81 mg by mouth daily. Yes Historical Provider   citalopram (CELEXA) 10 mg tablet Take  by mouth daily. Sima Moeller, MD       Physical Exam:   HEENT: Head: Normocephalic, no lesions, without obvious abnormality.    Heart: regular rate and rhythm, S1, S2 normal, no murmur, click, rub or gallop   Lungs: chest clear, no wheezing, rales, normal symmetric air entry, Heart exam - S1, S2 normal, no murmur, no gallop, rate regular   Abdominal: Bowel sounds are normal, liver is not enlarged, spleen is not enlarged     Signed:  Rema Gatica MD 4/9/2018

## 2018-04-09 NOTE — DISCHARGE INSTRUCTIONS
Erika Graft  [id number]  1940    EGD DISCHARGE INSTRUCTIONS  Discomfort:  Sore throat- throat lozenges or warm salt water gargle  redness at IV site- apply warm compress to area; if redness or soreness persist- contact your physician  Gaseous discomfort- walking, belching will help relieve any discomfort  You may not operate a vehicle for 12 hours  You may not engage in an occupation involving machinery or appliances for rest of today  You may not drink alcoholic beverages for at least 12 hours  Avoid making any critical decisions for at least 24 hour  DIET  You may resume your regular diet    ACTIVITY  Spend the remainder of the day resting -  avoid any strenuous activity. CALL M.D. ANY SIGN OF   Increasing pain, nausea, vomiting  Abdominal distension (swelling)  New increased bleeding (oral or rectal)  Fever (chills)  Pain in chest area  Bloody discharge from nose or mouth  Shortness of breath    Follow-up Instructions:   Call Jenn Knee  Biopsy results in 10 days  Telephone # [phone]  Brief Findings:   No narrowing but esophagus was stretched. DISCHARGE SUMMARY from Nurse    The following personal items collected during your admission are returned to you:   Dental Appliance: Dental Appliances: Uppers  Vision: Visual Aid: None  Hearing Aid:    Jewelry:    Clothing:    Other Valuables:    Valuables sent to safe: [employer]    Thank you for requesting access to [facility]. Please follow the instructions below to securely access and download your online medical record. [facility] allows you to send messages to your doctor, view your test results, renew your prescriptions, schedule appointments, and more. How Do I Sign Up? 1. In your internet browser, go to [email or web address]  2. Click on the First Time User? Click Here link in the Sign In box. You will be redirect to the New Member Sign Up page. 3. Enter your [facility] Access Code exactly as it appears below.  You will not need to use this code after youve completed the sign-up process. If you do not sign up before the expiration date, you must request a new code. [employer] Access Code: [email or web address]  Expires: 2018  7:49 AM (This is the date your [employer] access code will )    4. Enter the last four digits of your Social Security Number (xxxx) and Date of Birth (mm/dd/yyyy) as indicated and click Submit. You will be taken to the next sign-up page. 5. Create a [employer] ID. This will be your [employer] login ID and cannot be changed, so think of one that is secure and easy to remember. 6. Create a [employer] password. You can change your password at any time. 7. Enter your Password Reset Question and Answer. This can be used at a later time if you forget your password. 8. Enter your e-mail address. You will receive e-mail notification when new information is available in [address]. 9. Click Sign Up. You can now view and download portions of your medical record. 10. Click the Download Summary menu link to download a portable copy of your medical information. Additional Information    If you have questions, please visit the Frequently Asked Questions section of the [employer] website at [email or web address] Remember, [employer] is NOT to be used for urgent needs. For medical emergencies, dial 911.

## 2018-04-09 NOTE — IP AVS SNAPSHOT
[address] [facility] 
[phone] Patient: Florin Fragoso MRN: [id number] XCZ:[birth year] About your hospitalization You were admitted on:  April 9, 2018 You last received care in the:  [facility] ENDOSCOPY You were discharged on:  April 9, 2018 Why you were hospitalized Your primary diagnosis was:  Not on File Follow-up Information None Discharge Orders None A check wanda indicates which time of day the medication should be taken. My Medications CONTINUE taking these medications Instructions Each Dose to Equal  
 Morning Noon Evening Bedtime AGGRENOX  mg per SR capsule Generic drug:  aspirin-dipyridamole Your last dose was: Your next dose is: Take 1 Cap by mouth two (2) times a day. 1 Cap  
    
   
   
   
  
 amLODIPine 10 mg tablet Commonly known as:  Rony Valdovinos Your last dose was: Your next dose is: Take  by mouth daily. aspirin 81 mg chewable tablet Your last dose was: Your next dose is: Take 81 mg by mouth daily. 81 mg  
    
   
   
   
  
 carbidopa-levodopa ER  mg per tablet Commonly known as:  SINEMET CR Your last dose was: Your next dose is: Take 1 Tab by mouth two (2) times a day. 1 Tab CeleXA 10 mg tablet Generic drug:  citalopram  
   
Your last dose was: Your next dose is: Take  by mouth daily. ciprofloxacin HCl 250 mg tablet Commonly known as:  CIPRO Your last dose was: Your next dose is: Take 1 Tab by mouth every twelve (12) hours. 250 mg  
    
   
   
   
  
 donepezil 10 mg tablet Commonly known as:  ARICEPT Your last dose was: Your next dose is: Take 10 mg by mouth nightly. 10 mg  
    
   
   
   
  
 metoprolol succinate 100 mg tablet Commonly known as:  TOPROL-XL Your last dose was: Your next dose is: Take  by mouth daily. ondansetron 4 mg disintegrating tablet Commonly known as:  ZOFRAN ODT Your last dose was: Your next dose is: Take 1 Tab by mouth every eight (8) hours as needed for Nausea. 4 mg  
    
   
   
   
  
 polyethylene glycol 17 gram/dose powder Commonly known as:  Reece Taylor Your last dose was: Your next dose is: Take 17 g by mouth daily. 1 tablespoon with 8 oz of water daily 17 g  
    
   
   
   
  
 pravastatin 20 mg tablet Commonly known as:  PRAVACHOL Your last dose was: Your next dose is: Take 20 mg by mouth nightly. 20 mg  
    
   
   
   
  
 raNITIdine 150 mg tablet Commonly known as:  ZANTAC Your last dose was: Your next dose is: Take 150 mg by mouth nightly. 150 mg Discharge Instructions Destiny Lee [id number] 
1940 EGD DISCHARGE INSTRUCTIONS Discomfort: 
Sore throat- throat lozenges or warm salt water gargle 
redness at IV site- apply warm compress to area; if redness or soreness persist- contact your physician Gaseous discomfort- walking, belching will help relieve any discomfort You may not operate a vehicle for 12 hours You may not engage in an occupation involving machinery or appliances for rest of today You may not drink alcoholic beverages for at least 12 hours Avoid making any critical decisions for at least 24 hour DIET You may resume your regular diet ACTIVITY Spend the remainder of the day resting -  avoid any strenuous activity. CALL M.D. ANY SIGN OF Increasing pain, nausea, vomiting Abdominal distension (swelling) New increased bleeding (oral or rectal) Fever (chills) Pain in chest area Bloody discharge from nose or mouth Shortness of breath Follow-up Instructions: 
 Call Shruthi Nichole Biopsy results in 10 days Telephone # [phone] Brief Findings:   No narrowing but esophagus was stretched. DISCHARGE SUMMARY from Nurse The following personal items collected during your admission are returned to you:  
Dental Appliance: Dental Appliances: Uppers Vision: Visual Aid: None Hearing Aid:   
Jewelry:   
Clothing:   
Other Valuables:   
Valuables sent to safe: MyChart Activation Thank you for requesting access to [employer]. Please follow the instructions below to securely access and download your online medical record. [employer] allows you to send messages to your doctor, view your test results, renew your prescriptions, schedule appointments, and more. How Do I Sign Up? 1. In your internet browser, go to [email or web address] 
2. Click on the First Time User? Click Here link in the Sign In box. You will be redirect to the New Member Sign Up page. 3. Enter your [employer] Access Code exactly as it appears below. You will not need to use this code after youve completed the sign-up process. If you do not sign up before the expiration date, you must request a new code. [employer] Access Code: [email or web address] Expires: 2018  7:49 AM (This is the date your [employer] access code will ) 4. Enter the last four digits of your Social Security Number (xxxx) and Date of Birth (mm/dd/yyyy) as indicated and click Submit. You will be taken to the next sign-up page. 5. Create a [employer] ID. This will be your [employer] login ID and cannot be changed, so think of one that is secure and easy to remember. 6. Create a [employer] password. You can change your password at any time. 7. Enter your Password Reset Question and Answer. This can be used at a later time if you forget your password. 8. Enter your e-mail address.  You will receive e-mail notification when new information is available in [employer]. 9. Click Sign Up. You can now view and download portions of your medical record. 10. Click the Download Summary menu link to download a portable copy of your medical information. Additional Information If you have questions, please visit the Frequently Asked Questions section of the [employer]No[employer] website at [email or web address] Remember, DormNoise is NOT to be used for urgent needs. For medical emergencies, dial 911. Introducing [facility]! [facility] introduces [employer]ormNoise patient portal. Now you can access parts of your medical record, email your doctor's office, and request medication refills online. 1. In your internet browser, go to [email or web address] 2. Click on the First Time User? Click Here link in the Sign In box. You will see the New Member Sign Up page. 3. Enter your DormNoise Access Code exactly as it appears below. You will not need to use this code after youve completed the sign-up process. If you do not sign up before the expiration date, you must request a new code. · [employer]ormNoise Access Code: [email or web address] Expires: 7/8/2018  7:49 AM 
 
4. Enter the last four digits of your Social Security Number (xxxx) and Date of Birth (mm/dd/yyyy) as indicated and click Submit. You will be taken to the next sign-up page. 5. Create a DormNoise ID. This will be your DormNoise login ID and cannot be changed, so think of one that is secure and easy to remember. 6. Create a DormNoise password. You can change your password at any time. 7. Enter your Password Reset Question and Answer. This can be used at a later time if you forget your password. 8. Enter your e-mail address. You will receive e-mail notification when new information is available in [address] 9. Click Sign Up. You can now view and download portions of your medical record.  
10. Click the Download Summary menu link to download a portable copy of your medical information. If you have questions, please visit the Frequently Asked Questions section of the Eyenalyzehart website. Remember, [employer]el is NOT to be used for urgent needs. For medical emergencies, dial 911. Now available from your iPhone and Android! Introducing Lázaro Warner As a [employer] patient, I wanted to make you aware of our electronic visit tool called Lázaro Warner. [employer] 24/7 allows you to connect within minutes with a medical provider 24 hours a day, seven days a week via a mobile device or tablet or logging into a secure website from your computer. You can access Lázaro Warner from anywhere in the [place]. A virtual visit might be right for you when you have a simple condition and feel like you just dont want to get out of bed, or cant get away from work for an appointment, when your regular [employer] provider is not available (evenings, weekends or holidays), or when youre out of town and need minor care. Electronic visits cost only $49 and if the [employer] 24/7 provider determines a prescription is needed to treat your condition, one can be electronically transmitted to a nearby pharmacy*. Please take a moment to enroll today if you have not already done so. The enrollment process is free and takes just a few minutes. To enroll, please download the [employer] 24/7 ariadna to your tablet or phone, or visit [email or web address] to enroll on your computer. And, as an [address] [place] [address] patient with a [facility] [employer] account, the results of your visits will be scanned into your electronic medical record and your primary care provider will be able to view the scanned results. We urge you to continue to see your regular [employer] provider for your ongoing medical care.   And while your primary care provider may not be the one available when you seek a Lázaro Warner virtual visit, the peace of mind you get from getting a real diagnosis real time can be priceless. For more information on Lázaro Warner, view our Frequently Asked Questions (FAQs) at [email or web address]. Sincerely, 
 
Carlos Merrill MD 
Chief Medical Officer [address] *:  certain medications cannot be prescribed via Lázaro Warner Providers Seen During Your Hospitalization Provider Specialty Primary office phone Layla Roa MD Gastroenterology [phone] Your Primary Care Physician (PCP) Primary Care Physician Office Phone Office Fax Coy Alban [phone] You are allergic to the following No active allergies Recent Documentation Height Weight BMI Smoking Status 1.829 m 77.1 kg 23.06 kg/m2 Former Smoker Emergency Contacts Name Discharge Info Relation Home Work Mobile Rudi Cos DISCHARGE CAREGIVER [3] Friend [5]   [phone] Patient Belongings The following personal items are in your possession at time of discharge: 
  Dental Appliances: Uppers  Visual Aid: None Please provide this summary of care documentation to your next provider. Signatures-by signing, you are acknowledging that this After Visit Summary has been reviewed with you and you have received a copy. Patient Signature:  ____________________________________________________________ Date:  ____________________________________________________________  
  
[facility] Provider Signature:  ____________________________________________________________ Date:  ____________________________________________________________

## 2018-04-09 NOTE — ROUTINE PROCESS
Anesthesia reports 160mg Propofol, 100mg Lidocaine and 400mL NS given during procedure. Received report from anesthesia staff on vital signs and status of patient. Endoscope was pre-cleaned at the bedside immediately following procedure by Raudel Burch .

## 2018-04-09 NOTE — ANESTHESIA PREPROCEDURE EVALUATION
Anesthetic History   No history of anesthetic complications            Review of Systems / Medical History  Patient summary reviewed, nursing notes reviewed and pertinent labs reviewed    Pulmonary  Within defined limits                 Neuro/Psych       CVA (left side weakness)  Neuromuscular disease (Parkinson's) and TIA     Cardiovascular    Hypertension: well controlled          CAD (fair daily activity) and hyperlipidemia    Exercise tolerance: <4 METS  Comments: 12-30-17 EKG: ST, LAE, T wave abnormality    Denies chest pain or hrt probs   GI/Hepatic/Renal  Within defined limits             Comments: Difficulty swallowing Endo/Other  Within defined limits           Other Findings            Physical Exam    Airway  Mallampati: II  TM Distance: > 6 cm  Neck ROM: normal range of motion   Mouth opening: Normal     Cardiovascular  Regular rate and rhythm,  S1 and S2 normal,  no murmur, click, rub, or gallop  Rhythm: regular  Rate: normal         Dental    Dentition: Upper partial plate  Comments: Lower missing teeth   Pulmonary  Breath sounds clear to auscultation               Abdominal  GI exam deferred       Other Findings            Anesthetic Plan    ASA: 3  Anesthesia type: total IV anesthesia          Induction: Intravenous  Anesthetic plan and risks discussed with: Patient      Did NOT take beta blker this am   152/85 BP  HR 75

## 2018-04-09 NOTE — PERIOP NOTES
Destiny Lee  1940  [id number]    Situation:  Verbal report received from: ROSI Alatorre RN  Procedure: Procedure(s):  ESOPHAGOGASTRODUODENOSCOPY (EGD)  ESOPHAGEAL DILATION  ESOPHAGOGASTRODUODENAL (EGD) BIOPSY    Background:    Preoperative diagnosis: difficulty swallowing  Postoperative diagnosis: EGD:Chronic gastritis, dysphasia    :  Dr. Maria Luisa Kessler  Assistant(s): Endoscopy Technician-1: Kvng Sauceda  Endoscopy RN-1: Elaine Villa RN    Specimens:   ID Type Source Tests Collected by Time Destination   1 : Gastric Bx Preservative   Brian Wyatt MD 4/9/2018 0814 Pathology     H. Pylori  no    Assessment:  Intra-procedure medications   Anesthesia gave intra-procedure sedation and medications, see anesthesia flow sheet yes    Intravenous fluids: NS@ KVO     Vital signs stable     Abdominal assessment: round and soft     Recommendation:  Discharge patient per MD order.      Friend   Permission to share finding with family or friend yes

## 2018-04-09 NOTE — ANESTHESIA POSTPROCEDURE EVALUATION
Post-Anesthesia Evaluation and Assessment    Patient: Destiny Lee MRN: [id number]  SSN: [id number]    YOB: 1940  Age: 69 y.o. Sex: male       Cardiovascular Function/Vital Signs  Visit Vitals    BP (!) 180/99    Pulse 75    Temp 36.9 °C (98.4 °F)    Resp 12    Ht 6' (1.829 m)    Wt 77.1 kg (170 lb)    SpO2 96%    BMI 23.06 kg/m2       Patient is status post total IV anesthesia anesthesia for Procedure(s):  ESOPHAGOGASTRODUODENOSCOPY (EGD)  ESOPHAGEAL DILATION  ESOPHAGOGASTRODUODENAL (EGD) BIOPSY. Nausea/Vomiting: None    Postoperative hydration reviewed and adequate. Pain:  Pain Scale 1: Numeric (0 - 10) (04/09/18 0924)  Pain Intensity 1: 0 (04/09/18 0924)   Managed    Neurological Status: At baseline    Mental Status and Level of Consciousness: Arousable    Pulmonary Status:   O2 Device: Room air (04/09/18 0924)   Adequate oxygenation and airway patent    Complications related to anesthesia: None    Post-anesthesia assessment completed.  No concerns    Signed By: Silva Welch DO     April 9, 2018

## 2019-05-05 ENCOUNTER — HOSPITAL ENCOUNTER (INPATIENT)
Age: 79
LOS: 4 days | Discharge: NURSING, REHAB OR LONG-TERM CARE | DRG: 065 | End: 2019-05-10
Attending: EMERGENCY MEDICINE | Admitting: INTERNAL MEDICINE
Payer: MEDICARE

## 2019-05-05 ENCOUNTER — APPOINTMENT (OUTPATIENT)
Dept: GENERAL RADIOLOGY | Age: 79
DRG: 065 | End: 2019-05-05
Attending: EMERGENCY MEDICINE
Payer: MEDICARE

## 2019-05-05 ENCOUNTER — APPOINTMENT (OUTPATIENT)
Dept: CT IMAGING | Age: 79
DRG: 065 | End: 2019-05-05
Attending: EMERGENCY MEDICINE
Payer: MEDICARE

## 2019-05-05 DIAGNOSIS — I67.1 CEREBRAL ANEURYSM WITHOUT RUPTURE: ICD-10-CM

## 2019-05-05 DIAGNOSIS — G30.1 LATE ONSET ALZHEIMER'S DISEASE WITHOUT BEHAVIORAL DISTURBANCE (HCC): ICD-10-CM

## 2019-05-05 DIAGNOSIS — H53.462 LEFT HOMONYMOUS HEMIANOPSIA DUE TO RECENT CEREBRAL INFARCTION: ICD-10-CM

## 2019-05-05 DIAGNOSIS — G20 PARKINSON'S DISEASE (TREMOR, STIFFNESS, SLOW MOTION, UNSTABLE POSTURE) (HCC): ICD-10-CM

## 2019-05-05 DIAGNOSIS — I63.311 THROMBOTIC STROKE INVOLVING RIGHT MIDDLE CEREBRAL ARTERY (HCC): ICD-10-CM

## 2019-05-05 DIAGNOSIS — I63.9 ACUTE ISCHEMIC STROKE (HCC): Primary | ICD-10-CM

## 2019-05-05 DIAGNOSIS — F02.80 LATE ONSET ALZHEIMER'S DISEASE WITHOUT BEHAVIORAL DISTURBANCE (HCC): ICD-10-CM

## 2019-05-05 DIAGNOSIS — I69.398 LEFT HOMONYMOUS HEMIANOPSIA DUE TO RECENT CEREBRAL INFARCTION: ICD-10-CM

## 2019-05-05 DIAGNOSIS — I65.23 BILATERAL CAROTID ARTERY STENOSIS: ICD-10-CM

## 2019-05-05 DIAGNOSIS — G45.9 TIA (TRANSIENT ISCHEMIC ATTACK): ICD-10-CM

## 2019-05-05 LAB
ANION GAP SERPL CALC-SCNC: 6 MMOL/L (ref 5–15)
APPEARANCE UR: CLEAR
BACTERIA URNS QL MICRO: NEGATIVE /HPF
BASOPHILS # BLD: 0 K/UL (ref 0–0.1)
BASOPHILS NFR BLD: 0 % (ref 0–1)
BILIRUB UR QL: NEGATIVE
BUN SERPL-MCNC: 21 MG/DL (ref 6–20)
BUN/CREAT SERPL: 12 (ref 12–20)
CALCIUM SERPL-MCNC: 9.2 MG/DL (ref 8.5–10.1)
CHLORIDE SERPL-SCNC: 109 MMOL/L (ref 97–108)
CO2 SERPL-SCNC: 25 MMOL/L (ref 21–32)
COLOR UR: ABNORMAL
CREAT SERPL-MCNC: 1.79 MG/DL (ref 0.7–1.3)
DIFFERENTIAL METHOD BLD: ABNORMAL
EOSINOPHIL # BLD: 0.1 K/UL (ref 0–0.4)
EOSINOPHIL NFR BLD: 1 % (ref 0–7)
EPITH CASTS URNS QL MICRO: ABNORMAL /LPF
ERYTHROCYTE [DISTWIDTH] IN BLOOD BY AUTOMATED COUNT: 13.3 % (ref 11.5–14.5)
GLUCOSE BLD STRIP.AUTO-MCNC: 212 MG/DL (ref 65–100)
GLUCOSE SERPL-MCNC: 177 MG/DL (ref 65–100)
GLUCOSE UR STRIP.AUTO-MCNC: 100 MG/DL
HCT VFR BLD AUTO: 41.5 % (ref 36.6–50.3)
HGB BLD-MCNC: 13.9 G/DL (ref 12.1–17)
HGB UR QL STRIP: NEGATIVE
HYALINE CASTS URNS QL MICRO: ABNORMAL /LPF (ref 0–5)
IMM GRANULOCYTES # BLD AUTO: 0 K/UL (ref 0–0.04)
IMM GRANULOCYTES NFR BLD AUTO: 0 % (ref 0–0.5)
INR PPP: 1.1 (ref 0.9–1.1)
KETONES UR QL STRIP.AUTO: NEGATIVE MG/DL
LEUKOCYTE ESTERASE UR QL STRIP.AUTO: NEGATIVE
LYMPHOCYTES # BLD: 0.8 K/UL (ref 0.8–3.5)
LYMPHOCYTES NFR BLD: 13 % (ref 12–49)
MCH RBC QN AUTO: 30.5 PG (ref 26–34)
MCHC RBC AUTO-ENTMCNC: 33.5 G/DL (ref 30–36.5)
MCV RBC AUTO: 91 FL (ref 80–99)
MONOCYTES # BLD: 0.4 K/UL (ref 0–1)
MONOCYTES NFR BLD: 7 % (ref 5–13)
NEUTS SEG # BLD: 4.5 K/UL (ref 1.8–8)
NEUTS SEG NFR BLD: 79 % (ref 32–75)
NITRITE UR QL STRIP.AUTO: NEGATIVE
NRBC # BLD: 0 K/UL (ref 0–0.01)
NRBC BLD-RTO: 0 PER 100 WBC
PH UR STRIP: 6 [PH] (ref 5–8)
PLATELET # BLD AUTO: 199 K/UL (ref 150–400)
PMV BLD AUTO: 10.4 FL (ref 8.9–12.9)
POTASSIUM SERPL-SCNC: 4.2 MMOL/L (ref 3.5–5.1)
PROT UR STRIP-MCNC: 30 MG/DL
PROTHROMBIN TIME: 11.4 SEC (ref 9–11.1)
RBC # BLD AUTO: 4.56 M/UL (ref 4.1–5.7)
RBC #/AREA URNS HPF: ABNORMAL /HPF (ref 0–5)
RBC MORPH BLD: ABNORMAL
SERVICE CMNT-IMP: ABNORMAL
SODIUM SERPL-SCNC: 140 MMOL/L (ref 136–145)
SP GR UR REFRACTOMETRY: 1.02 (ref 1–1.03)
TROPONIN I SERPL-MCNC: <0.05 NG/ML
UA: UC IF INDICATED,UAUC: ABNORMAL
UROBILINOGEN UR QL STRIP.AUTO: 1 EU/DL (ref 0.2–1)
WBC # BLD AUTO: 5.8 K/UL (ref 4.1–11.1)
WBC URNS QL MICRO: ABNORMAL /HPF (ref 0–4)

## 2019-05-05 PROCEDURE — 99218 HC RM OBSERVATION: CPT

## 2019-05-05 PROCEDURE — 82962 GLUCOSE BLOOD TEST: CPT

## 2019-05-05 PROCEDURE — 93005 ELECTROCARDIOGRAM TRACING: CPT

## 2019-05-05 PROCEDURE — 85025 COMPLETE CBC W/AUTO DIFF WBC: CPT

## 2019-05-05 PROCEDURE — 96361 HYDRATE IV INFUSION ADD-ON: CPT

## 2019-05-05 PROCEDURE — 96360 HYDRATION IV INFUSION INIT: CPT

## 2019-05-05 PROCEDURE — 80048 BASIC METABOLIC PNL TOTAL CA: CPT

## 2019-05-05 PROCEDURE — 94762 N-INVAS EAR/PLS OXIMTRY CONT: CPT

## 2019-05-05 PROCEDURE — 71045 X-RAY EXAM CHEST 1 VIEW: CPT

## 2019-05-05 PROCEDURE — 70450 CT HEAD/BRAIN W/O DYE: CPT

## 2019-05-05 PROCEDURE — 99285 EMERGENCY DEPT VISIT HI MDM: CPT

## 2019-05-05 PROCEDURE — 36415 COLL VENOUS BLD VENIPUNCTURE: CPT

## 2019-05-05 PROCEDURE — 84484 ASSAY OF TROPONIN QUANT: CPT

## 2019-05-05 PROCEDURE — 74011250636 HC RX REV CODE- 250/636: Performed by: EMERGENCY MEDICINE

## 2019-05-05 PROCEDURE — 85610 PROTHROMBIN TIME: CPT

## 2019-05-05 PROCEDURE — 81001 URINALYSIS AUTO W/SCOPE: CPT

## 2019-05-05 RX ORDER — MULTIVITAMIN
1 TABLET ORAL DAILY
Status: ON HOLD | COMMUNITY
End: 2021-01-01

## 2019-05-05 RX ORDER — POLYETHYLENE GLYCOL 3350 17 G/17G
17 POWDER, FOR SOLUTION ORAL
Status: ON HOLD | COMMUNITY
End: 2021-01-01

## 2019-05-05 RX ADMIN — SODIUM CHLORIDE 1000 ML: 900 INJECTION, SOLUTION INTRAVENOUS at 16:32

## 2019-05-05 NOTE — PROGRESS NOTES
Pharmacy Clarification of Prior to Admission Medication Regimen The patient was not interviewed regarding clarification of the prior to admission medication regimen due to AMS. Patient's significant other and daughter were present in room and able to provide PTA medication history. Information Obtained From: Patient's significant other, personal med list, RX Query Pertinent Pharmacy Findings: 
citalopram (CELEXA) 10 mg tablet: Patient's significant other stated the patient takes this agent QHS and refuses to take his evening medications. Patient's significant other was uncertain of the last doses administered. dipyridamole-aspirin (AGGRENOX) 200-25 mg per SR capsule, carbidopa-levodopa ER (SINEMET CR)  mg per tablet: These agents are prescribed BID, but patient is only taking AM medications. Per patient's significant other, the patient refuses to take his evening medications. PTA medication list was corrected to the following:  
 
Prior to Admission Medications Prescriptions Last Dose Informant Patient Reported? Taking? amLODIPine (NORVASC) 10 mg tablet 5/5/2019 at Unknown time Significant Other Yes Yes Sig: Take 10 mg by mouth daily. aspirin 81 mg chewable tablet 5/5/2019 at Unknown time Significant Other Yes Yes Sig: Take 81 mg by mouth daily. calcium-cholecalciferol, D3, (CALTRATE 600+D) tablet 5/5/2019 at Unknown time Significant Other Yes Yes Sig: Take 1 Tab by mouth daily. carbidopa-levodopa ER (SINEMET CR)  mg per tablet 5/5/2019 at Unknown time Significant Other Yes Yes Sig: Take 1 Tab by mouth daily. citalopram (CELEXA) 10 mg tablet Not Taking at Unknown time Significant Other Yes No  
Sig: Take 10 mg by mouth every evening. dipyridamole-aspirin (AGGRENOX) 200-25 mg per SR capsule 5/5/2019 at Unknown time Significant Other Yes Yes Sig: Take 1 Cap by mouth daily.   
metoprolol succinate (TOPROL-XL) 100 mg XL tablet 5/5/2019 at Unknown time Significant Other Yes Yes Sig: Take 100 mg by mouth daily. polyethylene glycol (MIRALAX) 17 gram/dose powder 4/28/2019 at Unknown time Significant Other Yes Yes Sig: Take 17 g by mouth daily as needed (constipation). pravastatin (PRAVACHOL) 20 mg tablet 5/5/2019 at Unknown time Significant Other Yes Yes Sig: Take 20 mg by mouth daily. raNITIdine (ZANTAC) 150 mg tablet 5/5/2019 at Unknown time Significant Other Yes Yes Sig: Take 150 mg by mouth daily. Facility-Administered Medications: None Thank you, 
Yolanda Kussmaul, [facility] Medication History Pharmacy Technician

## 2019-05-05 NOTE — ED NOTES
Bedside and Verbal shift change report given to Irene Mendez RN (oncoming nurse) by Jovanni Lo RN (offgoing nurse). Report included the following information SBAR, ED Summary, MAR and Recent Results.

## 2019-05-05 NOTE — PROGRESS NOTES
Spiritual Care Assessment/Progress Note [address] 
 
 
NAME: Natali Clayton      MRN: [id number] AGE: 69 y.o. SEX: male Jehovah's witness Affiliation: Andrea Blanco  
Language: [place] 5/5/2019     Total Time (in minutes): 15 Spiritual Assessment begun in [facility] EMERGENCY DEPT through conversation with: 
  
    []Patient        [x] Family    [] Friend(s) Reason for Consult: Crisis, Other (comment)(Code Stroke) Spiritual beliefs: (Please include comment if needed) [x] Identifies with a karuna tradition:     
   [] Supported by a karuna community:        
   [] Claims no spiritual orientation:       
   [] Seeking spiritual identity:            
   [] Adheres to an individual form of spirituality:       
   [] Not able to assess:                   
 
    
Identified resources for coping:  
   [] Prayer                           
   [] Music                  [] Guided Imagery [x] Family/friends                 [] Pet visits [] Devotional reading                         [] Unknown 
   [] Other:                                          
 
 
Interventions offered during this visit: (See comments for more details) Patient Interventions: Crisis(Code Stroke) Family/Friend(s): Initial Assessment, Prayer (assurance of), Affirmation of karuna, Affirmation of emotions/emotional suffering, Normalization of emotional/spiritual concerns Plan of Care: 
 
 [x] Support spiritual and/or cultural needs  
 [] Support AMD and/or advance care planning process    
 [] Support grieving process 
 [] Coordinate Rites and/or Rituals  
 [] Coordination with community clergy [] No spiritual needs identified at this time 
 [] Detailed Plan of Care below (See Comments)  [] Make referral to Music Therapy 
[] Make referral to Pet Therapy    
[] Make referral to Addiction services 
[] Make referral to [facility] 
[] Make referral to Spiritual Care Partner 
[] No future visits requested [x] Follow up visits as needed Comments:  responded to stroke in ED. When  arrived the patient was in [place], but there were some family members waiting in the patients room.  visited with them for a few minutes. The family seemed a bit nervous but handling the news well. They said this was the first time they had seen this. The family shared a little about the patient and said he was [facility] and said prayers were always helpful.  provided pastoral support to the family. Spiritual Care will follow up as needed. Nathalie Freeman MDiv Pager: [phone]

## 2019-05-05 NOTE — ED PROVIDER NOTES
EMERGENCY DEPARTMENT HISTORY AND PHYSICAL EXAM 
 
 
Date: 5/5/2019 Patient Name: Alex Hong History of Presenting Illness Chief Complaint Patient presents with  Extremity Weakness  
  in bilateral legs since waking up at 9am this morning, and went to bed at 9pm. BG-[id number] in triage  Altered mental status  Blurred Vision  Dysarthria History Provided By: Patient's Wife HPI: Alex Hong, 69 y.o. male with PMHx significant for Parkinson's, stroke, hypertension, hyperlipidemia, presents to the ED with cc of acute, moderate confusion, dysarthria, and ataxia since waking up at 9 AM this morning. Patient's last known well time was 9 PM last evening when he went to bed. Patient denies any headache, arm weakness, leg weakness, chest pain, shortness of breath, abdominal pain, fevers, chills. Patient has questionable visual changes as well since last evening. Symptoms have been constant since onset last night. No other associated symptoms. No other exacerbating or ameliorating factors. There are no other complaints, changes, or physical findings at this time. PCP: Jerry Preciado NP No current facility-administered medications on file prior to encounter. Current Outpatient Medications on File Prior to Encounter Medication Sig Dispense Refill  polyethylene glycol (MIRALAX) 17 gram/dose powder Take 17 g by mouth daily as needed (constipation).  calcium-cholecalciferol, D3, (CALTRATE 600+D) tablet Take 1 Tab by mouth daily.  carbidopa-levodopa ER (SINEMET CR)  mg per tablet Take 1 Tab by mouth daily.  raNITIdine (ZANTAC) 150 mg tablet Take 150 mg by mouth daily.  amLODIPine (NORVASC) 10 mg tablet Take 10 mg by mouth daily.  metoprolol succinate (TOPROL-XL) 100 mg XL tablet Take 100 mg by mouth daily.  pravastatin (PRAVACHOL) 20 mg tablet Take 20 mg by mouth daily.  dipyridamole-aspirin (AGGRENOX) 200-25 mg per SR capsule Take 1 Cap by mouth daily.  aspirin 81 mg chewable tablet Take 81 mg by mouth daily.  citalopram (CELEXA) 10 mg tablet Take 10 mg by mouth every evening. Past History Past Medical History: 
Past Medical History:  
Diagnosis Date  CAD (coronary artery disease)  Hypertension  Other ill-defined conditions(799.89)   
 parkinson's  Other ill-defined conditions(799.89)   
 high cholesterol  Stroke [facility]) 2006  
 left sided weakness Past Surgical History: 
Past Surgical History:  
Procedure Laterality Date  HX CATARACT REMOVAL Bilateral   
 HX HEART CATHETERIZATION  2006  [place] COLONOSCOPY FLX DX W/COLLJ SPEC WHEN PFRMD  5/12/2014 Family History: 
History reviewed. No pertinent family history. Social History: 
Social History Tobacco Use  Smoking status: Former Smoker  Smokeless tobacco: Never Used Substance Use Topics  Alcohol use: No  
 Drug use: Never Allergies: 
No Known Allergies Review of Systems Review of Systems Constitutional: Negative for chills, diaphoresis, fatigue and fever. HENT: Negative for ear pain and sore throat. Eyes: Negative for pain and redness. Respiratory: Negative for cough and shortness of breath. Cardiovascular: Negative for chest pain and leg swelling. Gastrointestinal: Negative for abdominal pain, diarrhea, nausea and vomiting. Endocrine: Negative for cold intolerance and heat intolerance. Genitourinary: Negative for flank pain and hematuria. Musculoskeletal: Negative for back pain and neck stiffness. Skin: Negative for rash and wound. Neurological: Positive for speech difficulty, weakness and headaches. Negative for dizziness and syncope. All other systems reviewed and are negative. Physical Exam  
Physical Exam  
Constitutional: He is oriented to person, place, and time.  He appears well-developed and well-nourished. HENT:  
Head: Normocephalic and atraumatic. Mouth/Throat: Oropharynx is clear and moist. No oropharyngeal exudate. Eyes: Pupils are equal, round, and reactive to light. Conjunctivae and EOM are normal.  
Neck: Normal range of motion. Cardiovascular: Normal rate and regular rhythm. No murmur heard. Pulmonary/Chest: Effort normal and breath sounds normal. No respiratory distress. He has no wheezes. Abdominal: Soft. Bowel sounds are normal. He exhibits no distension. There is no tenderness. Musculoskeletal: Normal range of motion. He exhibits no edema or deformity. Neurological: He is alert and oriented to person, place, and time. Coordination abnormal.  
Patient with moderate dysarthria and especially aphasia. No change in baseline strength or sensory function in both upper and lower extremities. Slight facial droop seen on the right. Skin: Skin is warm and dry. No rash noted. Psychiatric: He has a normal mood and affect. His behavior is normal.  
Nursing note and vitals reviewed. Diagnostic Study Results Labs - Recent Results (from the past 24 hour(s)) METABOLIC PANEL, BASIC Collection Time: 05/09/19  4:50 AM  
Result Value Ref Range Sodium 138 136 - 145 mmol/L Potassium 4.1 3.5 - 5.1 mmol/L Chloride 108 97 - 108 mmol/L  
 CO2 21 21 - 32 mmol/L Anion gap 9 5 - 15 mmol/L Glucose 122 (H) 65 - 100 mg/dL BUN 15 6 - 20 MG/DL Creatinine 1.66 (H) 0.70 - 1.30 MG/DL  
 BUN/Creatinine ratio 9 (L) 12 - 20 GFR est AA 49 (L) >60 ml/min/1.73m2 GFR est non-AA 40 (L) >60 ml/min/1.73m2 Calcium 8.5 8.5 - 10.1 MG/DL Radiologic Studies -  
MRI BRAIN WO CONT Final Result IMPRESSION:   
  
1. Large acute infarct of the inferior left cerebellum 2. Flow voids within the vertebral arteries are diminished to absent 3. Possible 12 mm aneurysm off the distal internal carotid artery MRA BRAIN WO CONT Final Result IMPRESSION:    
1. No flow is demonstrated within the right vertebral artery and local stenosis  
flow is noted within the left vertebral artery. No flow is demonstrated within  
the basilar artery. 2. 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 Fusiform dilatation of the left cavernous carotid 4. Marked narrowing of the right cavernous carotid with fusiform dilatation of  
the distal right internal carotid artery 5. Multifocal areas of stenosis in the middle cerebral arteries bilaterally XR CHEST PORT Final Result IMPRESSION: No acute findings. CT CODE NEURO HEAD WO CONTRAST Final Result IMPRESSION: Chronic infarctions. No acute findings. CTA HEAD NECK W CONT    (Results Pending) CT Results  (Last 48 hours) None CXR Results  (Last 48 hours) None Medical Decision Making I am the first provider for this patient. I reviewed the vital signs, available nursing notes, past medical history, past surgical history, family history and social history. Vital Signs-Reviewed the patient's vital signs. Patient Vitals for the past 24 hrs: 
 Temp Pulse Resp BP SpO2  
05/09/19 0738 98.2 °F (36.8 °C) 72 18 148/82 99 % 05/09/19 0430 98 °F (36.7 °C) (!) 108 18 (!) 189/92 100 % 05/08/19 2348 97 °F (36.1 °C) 80 18 162/86 100 % 05/08/19 2007 97.3 °F (36.3 °C) 66 18 145/74 99 % 05/08/19 1608 97.6 °F (36.4 °C) 61 18 139/69 100 % 05/08/19 1245 98.2 °F (36.8 °C) 63 18 132/77 100 % Pulse Oximetry Analysis -96 % on room air Cardiac Monitor:  
Rate: 72 bpm 
Rhythm: Normal Sinus Rhythm Records Reviewed: Nursing Notes and Old Medical Records Differential Diagnosis: 
 
Acute ischemic stroke versus infection versus electrolyte abnormalities Provider Notes (Medical Decision Making):  
Patient with findings possibly consistent with acute ischemic stroke. Given patient's delayed presentation and is minimal deficit patient is not a TPA candidate at this time. Will admit for further work-up. ED Course:  
 
Initial assessment performed. The patients presenting problems have been discussed, and they are in agreement with the care plan formulated and outlined with them. I have encouraged them to ask questions as they arise throughout their visit. ED Course as of May 09 1109 Sam Gold May 05, 2019  
1605 Spoke with tele-neurologist, Dr. Carli Doe, who agrees patient is not a TPA candidate but will do evaluation to determine if she feels further stroke work-up is indicated. [CC] 1630 Reviewed labs. Creatinine does not appear significantly changed from prior values. Tele-neurology evaluation being performed now. [CC] 1819 Case reviewed with tele-neurology. At this time I feel patient should be admitted for formal stroke work-up given their exam findings. [CC] ED Course User Index 
[CC] Miguel Tran MD  
 
 
Critical Care Time:  
 
None Disposition: 
Admit Note: 
6:07 PM 
Pt is being admitted by hospitalist. The results of their tests and reason(s) for their admission have been discussed with pt and/or available family. They convey agreement and understanding for the need to be admitted and for admission diagnosis. PLAN: 
1. Current Discharge Medication List  
  
 
2. Follow-up Information Follow up With Specialties Details Why Contact Info [facility]. Patient will be going to [address] [place] [address] for rehab when medically stable. Return to ED if worse Diagnosis Clinical Impression: 1. Acute ischemic stroke ([address] 2. Bilateral carotid artery stenosis 3. Late onset Alzheimer's disease without behavioral disturbance 4. Left homonymous hemianopsia due to recent cerebral infarction 5. Parkinson's disease (tremor, stiffness, slow motion, unstable posture) ([address] 6. Thrombotic stroke involving right middle cerebral artery ([facility] [address] 7. TIA (transient ischemic attack)

## 2019-05-06 ENCOUNTER — APPOINTMENT (OUTPATIENT)
Dept: MRI IMAGING | Age: 79
DRG: 065 | End: 2019-05-06
Attending: INTERNAL MEDICINE
Payer: MEDICARE

## 2019-05-06 ENCOUNTER — APPOINTMENT (OUTPATIENT)
Dept: VASCULAR SURGERY | Age: 79
DRG: 065 | End: 2019-05-06
Attending: INTERNAL MEDICINE
Payer: MEDICARE

## 2019-05-06 PROBLEM — G20 PARKINSON'S DISEASE (TREMOR, STIFFNESS, SLOW MOTION, UNSTABLE POSTURE) (HCC): Status: ACTIVE | Noted: 2019-05-06

## 2019-05-06 PROBLEM — F02.80 LATE ONSET ALZHEIMER'S DISEASE WITHOUT BEHAVIORAL DISTURBANCE (HCC): Status: ACTIVE | Noted: 2019-05-06

## 2019-05-06 PROBLEM — G30.1 LATE ONSET ALZHEIMER'S DISEASE WITHOUT BEHAVIORAL DISTURBANCE (HCC): Status: ACTIVE | Noted: 2019-05-06

## 2019-05-06 PROBLEM — I63.9 STROKE (CEREBRUM) (HCC): Status: ACTIVE | Noted: 2019-05-06

## 2019-05-06 PROBLEM — H53.462 LEFT HOMONYMOUS HEMIANOPSIA DUE TO RECENT CEREBRAL INFARCTION: Status: ACTIVE | Noted: 2019-05-06

## 2019-05-06 PROBLEM — I69.398 LEFT HOMONYMOUS HEMIANOPSIA DUE TO RECENT CEREBRAL INFARCTION: Status: ACTIVE | Noted: 2019-05-06

## 2019-05-06 PROBLEM — I63.311 THROMBOTIC STROKE INVOLVING RIGHT MIDDLE CEREBRAL ARTERY (HCC): Status: ACTIVE | Noted: 2019-05-06

## 2019-05-06 PROBLEM — I65.23 BILATERAL CAROTID ARTERY STENOSIS: Status: ACTIVE | Noted: 2019-05-06

## 2019-05-06 LAB
25(OH)D3 SERPL-MCNC: 19.1 NG/ML (ref 30–100)
ATRIAL RATE: 68 BPM
CALCULATED P AXIS, ECG09: 35 DEGREES
CALCULATED R AXIS, ECG10: -4 DEGREES
CALCULATED T AXIS, ECG11: 5 DEGREES
CK SERPL-CCNC: 103 U/L (ref 39–308)
DIAGNOSIS, 93000: NORMAL
ERYTHROCYTE [SEDIMENTATION RATE] IN BLOOD: 34 MM/HR (ref 0–20)
FOLATE SERPL-MCNC: 15.6 NG/ML (ref 5–21)
LEFT CCA DIST DIAS: 12.8 CM/S
LEFT CCA DIST SYS: 66.1 CM/S
LEFT CCA PROX DIAS: 12.3 CM/S
LEFT CCA PROX SYS: 76.7 CM/S
LEFT ECA DIAS: 0 CM/S
LEFT ECA SYS: 63.4 CM/S
LEFT ICA DIST DIAS: 15.9 CM/S
LEFT ICA DIST SYS: 47.9 CM/S
LEFT ICA MID DIAS: 13.5 CM/S
LEFT ICA MID SYS: 39.8 CM/S
LEFT ICA PROX DIAS: 9.7 CM/S
LEFT ICA PROX SYS: 32.6 CM/S
LEFT ICA/CCA SYS: 0.72
LEFT SUBCLAVIAN DIAS: 0 CM/S
LEFT SUBCLAVIAN SYS: 73.4 CM/S
LEFT VERTEBRAL DIAS: 0 CM/S
LEFT VERTEBRAL SYS: 19.7 CM/S
MAGNESIUM SERPL-MCNC: 2.2 MG/DL (ref 1.6–2.4)
P-R INTERVAL, ECG05: 160 MS
Q-T INTERVAL, ECG07: 418 MS
QRS DURATION, ECG06: 88 MS
QTC CALCULATION (BEZET), ECG08: 444 MS
RIGHT CCA DIST DIAS: 7.7 CM/S
RIGHT CCA DIST SYS: 49.4 CM/S
RIGHT CCA PROX DIAS: 4.4 CM/S
RIGHT CCA PROX SYS: 63.3 CM/S
RIGHT ECA DIAS: 0 CM/S
RIGHT ECA SYS: 36.7 CM/S
RIGHT ICA DIST DIAS: 8.5 CM/S
RIGHT ICA DIST SYS: 31.8 CM/S
RIGHT ICA MID DIAS: 5.6 CM/S
RIGHT ICA MID SYS: 38.1 CM/S
RIGHT ICA PROX DIAS: 6.8 CM/S
RIGHT ICA PROX SYS: 35.7 CM/S
RIGHT ICA/CCA SYS: 0.8
RIGHT SUBCLAVIAN DIAS: 0 CM/S
RIGHT SUBCLAVIAN SYS: 50.6 CM/S
RIGHT VERTEBRAL DIAS: 9.05 CM/S
RIGHT VERTEBRAL SYS: 61.5 CM/S
TSH SERPL DL<=0.05 MIU/L-ACNC: 1.32 UIU/ML (ref 0.36–3.74)
VENTRICULAR RATE, ECG03: 68 BPM
VIT B12 SERPL-MCNC: 719 PG/ML (ref 193–986)

## 2019-05-06 PROCEDURE — 74011250637 HC RX REV CODE- 250/637: Performed by: INTERNAL MEDICINE

## 2019-05-06 PROCEDURE — 82784 ASSAY IGA/IGD/IGG/IGM EACH: CPT

## 2019-05-06 PROCEDURE — 82607 VITAMIN B-12: CPT

## 2019-05-06 PROCEDURE — 84443 ASSAY THYROID STIM HORMONE: CPT

## 2019-05-06 PROCEDURE — 97535 SELF CARE MNGMENT TRAINING: CPT | Performed by: OCCUPATIONAL THERAPIST

## 2019-05-06 PROCEDURE — 74011250636 HC RX REV CODE- 250/636: Performed by: INTERNAL MEDICINE

## 2019-05-06 PROCEDURE — 36415 COLL VENOUS BLD VENIPUNCTURE: CPT

## 2019-05-06 PROCEDURE — 99218 HC RM OBSERVATION: CPT

## 2019-05-06 PROCEDURE — 70551 MRI BRAIN STEM W/O DYE: CPT

## 2019-05-06 PROCEDURE — 97116 GAIT TRAINING THERAPY: CPT

## 2019-05-06 PROCEDURE — 82306 VITAMIN D 25 HYDROXY: CPT

## 2019-05-06 PROCEDURE — 92610 EVALUATE SWALLOWING FUNCTION: CPT

## 2019-05-06 PROCEDURE — 93880 EXTRACRANIAL BILAT STUDY: CPT

## 2019-05-06 PROCEDURE — 70544 MR ANGIOGRAPHY HEAD W/O DYE: CPT

## 2019-05-06 PROCEDURE — 65660000000 HC RM CCU STEPDOWN

## 2019-05-06 PROCEDURE — 85652 RBC SED RATE AUTOMATED: CPT

## 2019-05-06 PROCEDURE — 97530 THERAPEUTIC ACTIVITIES: CPT

## 2019-05-06 PROCEDURE — 83735 ASSAY OF MAGNESIUM: CPT

## 2019-05-06 PROCEDURE — 82746 ASSAY OF FOLIC ACID SERUM: CPT

## 2019-05-06 PROCEDURE — 97166 OT EVAL MOD COMPLEX 45 MIN: CPT | Performed by: OCCUPATIONAL THERAPIST

## 2019-05-06 PROCEDURE — 82550 ASSAY OF CK (CPK): CPT

## 2019-05-06 PROCEDURE — 74011250637 HC RX REV CODE- 250/637: Performed by: PSYCHIATRY & NEUROLOGY

## 2019-05-06 PROCEDURE — 97162 PT EVAL MOD COMPLEX 30 MIN: CPT

## 2019-05-06 RX ORDER — RANITIDINE 15 MG/ML
150 SYRUP ORAL DAILY
Status: DISCONTINUED | OUTPATIENT
Start: 2019-05-06 | End: 2019-05-06

## 2019-05-06 RX ORDER — POLYETHYLENE GLYCOL 3350 17 G/17G
17 POWDER, FOR SOLUTION ORAL DAILY
Status: DISCONTINUED | OUTPATIENT
Start: 2019-05-06 | End: 2019-05-10 | Stop reason: HOSPADM

## 2019-05-06 RX ORDER — CARBIDOPA AND LEVODOPA 50; 200 MG/1; MG/1
1 TABLET, EXTENDED RELEASE ORAL DAILY
Status: DISCONTINUED | OUTPATIENT
Start: 2019-05-06 | End: 2019-05-06

## 2019-05-06 RX ORDER — PRAVASTATIN SODIUM 10 MG/1
20 TABLET ORAL DAILY
Status: DISCONTINUED | OUTPATIENT
Start: 2019-05-06 | End: 2019-05-06

## 2019-05-06 RX ORDER — SODIUM CHLORIDE 0.9 % (FLUSH) 0.9 %
5-40 SYRINGE (ML) INJECTION EVERY 8 HOURS
Status: DISCONTINUED | OUTPATIENT
Start: 2019-05-06 | End: 2019-05-10 | Stop reason: HOSPADM

## 2019-05-06 RX ORDER — SODIUM CHLORIDE 0.9 % (FLUSH) 0.9 %
5-40 SYRINGE (ML) INJECTION AS NEEDED
Status: DISCONTINUED | OUTPATIENT
Start: 2019-05-06 | End: 2019-05-10 | Stop reason: HOSPADM

## 2019-05-06 RX ORDER — GUAIFENESIN 100 MG/5ML
81 LIQUID (ML) ORAL DAILY
Status: DISCONTINUED | OUTPATIENT
Start: 2019-05-06 | End: 2019-05-06

## 2019-05-06 RX ORDER — ASPIRIN AND DIPYRIDAMOLE 25; 200 MG/1; MG/1
1 CAPSULE, EXTENDED RELEASE ORAL DAILY
Status: DISCONTINUED | OUTPATIENT
Start: 2019-05-06 | End: 2019-05-06

## 2019-05-06 RX ORDER — METOPROLOL SUCCINATE 50 MG/1
100 TABLET, EXTENDED RELEASE ORAL DAILY
Status: DISCONTINUED | OUTPATIENT
Start: 2019-05-06 | End: 2019-05-06

## 2019-05-06 RX ORDER — PRAVASTATIN SODIUM 40 MG/1
80 TABLET ORAL DAILY
Status: DISCONTINUED | OUTPATIENT
Start: 2019-05-06 | End: 2019-05-10 | Stop reason: HOSPADM

## 2019-05-06 RX ORDER — AMLODIPINE BESYLATE 5 MG/1
10 TABLET ORAL DAILY
Status: DISCONTINUED | OUTPATIENT
Start: 2019-05-06 | End: 2019-05-10 | Stop reason: HOSPADM

## 2019-05-06 RX ORDER — CITALOPRAM 20 MG/1
10 TABLET, FILM COATED ORAL EVERY EVENING
Status: DISCONTINUED | OUTPATIENT
Start: 2019-05-06 | End: 2019-05-10 | Stop reason: HOSPADM

## 2019-05-06 RX ORDER — ONDANSETRON 2 MG/ML
4 INJECTION INTRAMUSCULAR; INTRAVENOUS
Status: DISCONTINUED | OUTPATIENT
Start: 2019-05-06 | End: 2019-05-10 | Stop reason: HOSPADM

## 2019-05-06 RX ORDER — ACETAMINOPHEN 325 MG/1
650 TABLET ORAL
Status: DISCONTINUED | OUTPATIENT
Start: 2019-05-06 | End: 2019-05-10 | Stop reason: HOSPADM

## 2019-05-06 RX ORDER — HEPARIN SODIUM 5000 [USP'U]/ML
5000 INJECTION, SOLUTION INTRAVENOUS; SUBCUTANEOUS EVERY 8 HOURS
Status: DISCONTINUED | OUTPATIENT
Start: 2019-05-06 | End: 2019-05-10 | Stop reason: HOSPADM

## 2019-05-06 RX ORDER — CARBIDOPA AND LEVODOPA 25; 100 MG/1; MG/1
1 TABLET ORAL 3 TIMES DAILY
Status: DISCONTINUED | OUTPATIENT
Start: 2019-05-06 | End: 2019-05-10 | Stop reason: HOSPADM

## 2019-05-06 RX ORDER — FAMOTIDINE 20 MG/1
20 TABLET, FILM COATED ORAL DAILY
Status: DISCONTINUED | OUTPATIENT
Start: 2019-05-07 | End: 2019-05-10 | Stop reason: HOSPADM

## 2019-05-06 RX ORDER — ACETAMINOPHEN 650 MG/1
650 SUPPOSITORY RECTAL
Status: DISCONTINUED | OUTPATIENT
Start: 2019-05-06 | End: 2019-05-10 | Stop reason: HOSPADM

## 2019-05-06 RX ORDER — ASPIRIN AND DIPYRIDAMOLE 25; 200 MG/1; MG/1
1 CAPSULE, EXTENDED RELEASE ORAL
Status: DISCONTINUED | OUTPATIENT
Start: 2019-05-06 | End: 2019-05-10 | Stop reason: HOSPADM

## 2019-05-06 RX ORDER — METOPROLOL SUCCINATE 25 MG/1
25 TABLET, EXTENDED RELEASE ORAL DAILY
Status: DISCONTINUED | OUTPATIENT
Start: 2019-05-07 | End: 2019-05-10 | Stop reason: HOSPADM

## 2019-05-06 RX ADMIN — HEPARIN SODIUM 5000 UNITS: 5000 INJECTION INTRAVENOUS; SUBCUTANEOUS at 04:59

## 2019-05-06 RX ADMIN — Medication 10 ML: at 22:46

## 2019-05-06 RX ADMIN — HEPARIN SODIUM 5000 UNITS: 5000 INJECTION INTRAVENOUS; SUBCUTANEOUS at 13:43

## 2019-05-06 RX ADMIN — RANITIDINE 150 MG: 15 SYRUP ORAL at 10:05

## 2019-05-06 RX ADMIN — ASPIRIN AND DIPYRIDAMOLE 1 CAPSULE: 25; 200 CAPSULE, EXTENDED RELEASE ORAL at 20:55

## 2019-05-06 RX ADMIN — ASPIRIN 81 MG 81 MG: 81 TABLET ORAL at 10:05

## 2019-05-06 RX ADMIN — HEPARIN SODIUM 5000 UNITS: 5000 INJECTION INTRAVENOUS; SUBCUTANEOUS at 20:56

## 2019-05-06 RX ADMIN — ASPIRIN AND DIPYRIDAMOLE 1 CAPSULE: 25; 200 CAPSULE, EXTENDED RELEASE ORAL at 10:05

## 2019-05-06 RX ADMIN — PRAVASTATIN SODIUM 80 MG: 40 TABLET ORAL at 10:05

## 2019-05-06 RX ADMIN — CITALOPRAM HYDROBROMIDE 10 MG: 20 TABLET ORAL at 20:55

## 2019-05-06 RX ADMIN — CARBIDOPA AND LEVODOPA 1 TABLET: 25; 100 TABLET ORAL at 22:44

## 2019-05-06 RX ADMIN — CARBIDOPA AND LEVODOPA 1 TABLET: 50; 200 TABLET, EXTENDED RELEASE ORAL at 10:05

## 2019-05-06 RX ADMIN — POLYETHYLENE GLYCOL 3350 17 G: 17 POWDER, FOR SOLUTION ORAL at 10:05

## 2019-05-06 NOTE — PROGRESS NOTES
MRI PENDING Mri screening sheet needs to be completed and signed Call [phone]  When this is done Fax [phone]

## 2019-05-06 NOTE — PROGRESS NOTES
*Acute Goals and Plan of Care (Insert Text) Not Progressing Towards Goal 
Physical Therapy Goals Initiated 5/6/2019 1. Patient will move from supine to sit and sit to supine , scoot up and down and roll side to side in bed with modified independence within 7 day(s). 2. Patient will transfer from bed to chair and chair to bed with minimal assistance/contact guard assist using the least restrictive device within 7 day(s). 3. Patient will perform sit to stand with minimal assistance/contact guard assist within 7 day(s). 4. Patient will ambulate with minimal assistance/contact guard assist for 100 feet with the least restrictive device within 7 day(s). 5. Patient will ascend/descend 3 stairs with 2 handrail(s) with minimal assistance/contact guard assist within 7 day(s). 6. Patient will improve George Balance score by 7 points within 7 days. PHYSICAL THERAPY EVALUATION- NEURO POPULATION Patient: Rayna Alejandre (60 y.o. male) Date: 5/6/2019 Primary Diagnosis: TIA (transient ischemic attack) [G45.9] Precautions: Bed Alarm, Roll Belt ASSESSMENT : 
Based on the objective data described below, the patient presents with decreased strength, impaired balance, impaired safety awareness, unsteady festinating gait, and overall decreased functional mobility. Patient received supine in bed, agreeable and cleared for therapy. Patient required SBA for bed mobility. ModA for sit<>stand transfers with increased cueing for hand placement for maximal safety. Ambulated 61' modA with SPC. Pt demonstrated festinating gait, shuffling, increased R trunk lean, improper sequencing of SPC, and limited safety awareness. [place] cues for assisting L trunk lean. Standing and sitting balance impaired, evidenced by 7/56 GEORGE score. Patient left sitting in chair with all needs met and roll belt applied. Recommend IP Rehab at discharge.   
 
Patient will benefit from skilled intervention to address the above impairments. Patients rehabilitation potential is considered to be Good Factors which may influence rehabilitation potential include:  
? None noted ? Mental ability/status ? Medical condition ? Home/family situation and support systems ? Safety awareness 
? Pain tolerance/management 
? Other: PLAN : 
Recommendations and Planned Interventions: ?             Bed Mobility Training             ? Neuromuscular Re-Education ? Transfer Training                   ? Orthotic/Prosthetic Training ? Gait Training                         ? Modalities ? Therapeutic Exercises           ? Edema Management/Control ? Therapeutic Activities            ? Patient and Family Training/Education ? Other (comment): Frequency/Duration: Patient will be followed by physical therapy 5 times a week to address goals. Discharge Recommendations: Inpatient Rehab Further Equipment Recommendations for Discharge: TBD SUBJECTIVE:  
Patient stated I feel pretty steady.  OBJECTIVE DATA SUMMARY:  
HISTORY:   
Past Medical History:  
Diagnosis Date  CAD (coronary artery disease)  Hypertension  Other ill-defined conditions(799.89)   
 parkinson's  Other ill-defined conditions(799.89)   
 high cholesterol  Stroke [facility]) 2006  
 left sided weakness Past Surgical History:  
Procedure Laterality Date  HX CATARACT REMOVAL Bilateral   
 HX HEART CATHETERIZATION  2006  [place] COLONOSCOPY FLX DX W/COLLJ SPEC WHEN PFRMD  5/12/2014 Prior Level of Function/Home Situation: Patient lives with wife and daughter. Reports being independent with ADLs and mobility prior to admission. Uses SPC and owns a RW. Personal factors and/or comorbidities impacting plan of care: Parkinson's, limited safety awareness, decreased strength Home Situation Home Environment: Private residence # Steps to Enter: 3 Rails to Enter: Yes Hand Rails : Bilateral 
One/Two Story Residence: One story Living Alone: No 
Support Systems: Spouse/Significant Other/Partner, Child(scott) Patient Expects to be Discharged to[de-identified] Private residence Current DME Used/Available at Home: Shabbira Idalmis, straight, Walker, rolling, Shower chair, Grab bars Tub or Shower Type: Shower EXAMINATION/PRESENTATION/DECISION MAKING:  
Critical Behavior: 
Neurologic State: Alert Orientation Level: Oriented X4 Cognition: Follows commands Safety/Judgement: Awareness of environment Hearing: Auditory Auditory Impairment: None Range Of Motion: 
AROM: Within functional limits PROM: Within functional limits Strength:   
Strength: Generally decreased, functional(BLE 4/5) Coordination: 
Coordination: Generally decreased, functional 
Vision:  
Tracking: Able to track stimulus in all quadrants w/o difficulty Functional Mobility: 
Bed Mobility: 
Rolling: Stand-by assistance Supine to Sit: Stand-by assistance Scooting: Stand-by assistance Transfers: 
Sit to Stand: Moderate assistance Stand to Sit: Moderate assistance(cues for hand placement) Bed to Chair: Moderate assistance Balance:  
Sitting: Impaired; With support Sitting - Static: Fair (occasional) Sitting - Dynamic: Poor (constant support) Standing: Impaired; With support Standing - Static: Poor Standing - Dynamic : Poor Ambulation/Gait Training: 
Distance (ft): 60 Feet (ft) Assistive Device: Gait belt;Cane, straight Ambulation - Level of Assistance: Moderate assistance;Assist x1(assistance with L weight shift) Gait Abnormalities: Festinating gait; Path deviations; Shuffling gait; Decreased step clearance;Trunk sway increased Base of Support: Narrowed; Shift to right(significant R trunk lean) Speed/Marcela: Shuffled; Slow Step Length: Right shortened;Left shortened Swing Pattern: Left asymmetrical;Right asymmetrical 
  
  
 
Functional Measure George Balance Test: 
 
Sitting to Standin Standing Unsupported: 0 Sitting with Back Unsupported: 1 Standing to Sittin Transfers: 1 Standing Unsupported with Eyes Closed: 1 Standing Unsupported with Feet Together: 0 Reach Forward with Outstretched Arm: 0  Object: 0 Turn to Look Over Shoulders: 3 Turn 360 Degrees: 0 Alternate Foot on Step/Stool: 0 Standing Unsupported One Foot in Front: 1 Stand on One Le Total: 7 
 
 
 
56=Maximum possible score;  
0-20=High fall risk 21-40=Moderate fall risk 41-56=Low fall risk Physical Therapy Evaluation Charge Determination History Examination Presentation Decision-Making MEDIUM  Complexity : 1-2 comorbidities / personal factors will impact the outcome/ POC  MEDIUM Complexity : 3 Standardized tests and measures addressing body structure, function, activity limitation and / or participation in recreation  MEDIUM Complexity : Evolving with changing characteristics  Other outcome measures GEORGE  HIGH Based on the above components, the patient evaluation is determined to be of the following complexity level: MEDIUM Activity Tolerance:  
Good Please refer to the flowsheet for vital signs taken during this treatment. After treatment:  
?     Patient left in no apparent distress sitting up in chair ? Patient left in no apparent distress in bed 
? Call bell left within reach ? Nursing notified ? Caregiver present ? Bed alarm activated COMMUNICATION/EDUCATION:  
The patients plan of care was discussed with: Registered Nurse and . ?  Fall prevention education was provided and the patient/caregiver indicated understanding. ? Patient/family have participated as able in goal setting and plan of care. ?  Patient/family agree to work toward stated goals and plan of care. ?  Patient understands intent and goals of therapy, but is neutral about his/her participation. ? Patient is unable to participate in goal setting and plan of care. Thank you for this referral. 
Kath Zacarias Time Calculation: 48 mins Regarding student involvement in patient care: A student participated in this treatment session. Per CMS Medicare statements and APTA guidelines I certify that the following was true: 1. I was present and directly observed the entire session. 2. I made all skilled judgments and clinical decisions regarding care. 3. I am the practitioner responsible for assessment, treatment, and documentation.

## 2019-05-06 NOTE — PROGRESS NOTES
Pt pulled out IV and became agitated and insistent that he could ambulate to the bathroom. Pt was not compliant and didn't understand that he wasn't currently stable enough to ambulate to the bathroom. Pt was placed in a self releasing roll belt for his own safety. The pt was educated about how the belt unfastens and why it was being placed on him.

## 2019-05-06 NOTE — PROGRESS NOTES
Speech pathology Orders received, attempted to see. Patient working with PT. Will see later.  Kevin Osuna M.S. [id number]

## 2019-05-06 NOTE — PROGRESS NOTES
Orders received, chart reviewed and patient evaluated by physical therapy. Recommend patient to discharge to IP rehab if family cannot care for patient at this level and pending progress with skilled acute care physical therapy. Recommend with nursing patient to complete as able in order to maintain strength, endurance and independence: OOB to chair 3x/day with x 2 assist and ambulating with x 2 assist with SPC. Thank you for your assistance. Full evaluation to follow.   
 
Stefan Salazar, PT, DPT

## 2019-05-06 NOTE — H&P
Hospitalist Admission NoteNAME: Maricarmen Edwards :  1940 MRN:  [id number] Date/Time:  2019 3:06 AM 
 
Patient PCP: Julieann Meckel, NP 
______________________________________________________________________ Given the patient's current clinical presentation, I have a high level of concern for decompensation if discharged from the emergency department. Complex decision making was performed, which includes reviewing the patient's available past medical records, laboratory results, and x-ray films. My assessment of this patient's clinical condition and my plan of care is as follows. Assessment / Plan: 
TIA 
-vs worsening of Parkinson symps? 
-ED discussed with tele neuro 
-admit OBS to neuro/tele 
-CT head chronic infarctions, no acute findings 
-check carotids, echo 
-check MRI brain 
-cont asa, aggranox, statin 
-consult neuro 
-lipid panel, A1C 
-PT/OT/speech consults Parkinsons 
-cont sinemet HTN 
-permissive HTN, hold amlodipine, metoprolol 
-currently normotensive CAD 
-hold bb as above, cont statin asa Anxiety/depression 
-cont celexa Code Status: Full Surrogate Decision Maker: DVT Prophylaxis:heparin GI Prophylaxis: not indicated Baseline: amulates with cane, lives at home with wife and duaghter Subjective: CHIEF COMPLAINT: slow speech, gen weakness HISTORY OF PRESENT ILLNESS:    
Maricarmen Edwards is a 69 y.o. male, PMHx of prior CVA, CAD, HTN, Parkinson's who presents to the ED with slower speech and acute worsening of b/l LE weakness starting this AM when the patient tried to get out of bed. Wife present and states speech remains different than his baseline with slower stuttering speech. Pt was unable to stand without assistance when attempting to get out of bed.  Per his wife, he normally is able to walk short distances in the house without assistance and uses a cane outside of the home at [facility]. Pt denies palpitations, near-syncope, visual changes, or changes in sensation. We were asked to admit for work up and evaluation of the above problems. Past Medical History:  
Diagnosis Date  CAD (coronary artery disease)  Hypertension  Other ill-defined conditions(799.89)   
 parkinson's  Other ill-defined conditions(799.89)   
 high cholesterol  Stroke [facility]) 2006  
 left sided weakness Past Surgical History:  
Procedure Laterality Date  HX CATARACT REMOVAL Bilateral   
 HX HEART CATHETERIZATION  2006  ND COLONOSCOPY FLX DX W/COLLJ SPEC WHEN PFRMD  5/12/2014 Social History Tobacco Use  Smoking status: Former Smoker  Smokeless tobacco: Never Used Substance Use Topics  Alcohol use: No  
  
 
History reviewed. No pertinent family history. No Known Allergies Prior to Admission medications Medication Sig Start Date End Date Taking? Authorizing Provider  
polyethylene glycol (MIRALAX) 17 gram/dose powder Take 17 g by mouth daily as needed (constipation). Yes Provider, Historical  
calcium-cholecalciferol, D3, (CALTRATE 600+D) tablet Take 1 Tab by mouth daily. Yes Provider, Historical  
carbidopa-levodopa ER (SINEMET CR)  mg per tablet Take 1 Tab by mouth daily. Yes Other, MD Sima  
raNITIdine (ZANTAC) 150 mg tablet Take 150 mg by mouth daily. Yes Other, MD Sima  
amLODIPine (NORVASC) 10 mg tablet Take 10 mg by mouth daily. Yes Provider, Historical  
metoprolol succinate (TOPROL-XL) 100 mg XL tablet Take 100 mg by mouth daily. Yes Provider, Historical  
pravastatin (PRAVACHOL) 20 mg tablet Take 20 mg by mouth daily. Yes Provider, Historical  
dipyridamole-aspirin (AGGRENOX) 200-25 mg per SR capsule Take 1 Cap by mouth daily. Yes Provider, Historical  
aspirin 81 mg chewable tablet Take 81 mg by mouth daily.    Yes Provider, Historical  
 citalopram (CELEXA) 10 mg tablet Take 10 mg by mouth every evening. Sima Moeller MD  
 
 
REVIEW OF SYSTEMS:    
I am not able to complete the review of systems because: The patient is intubated and sedated The patient has altered mental status due to his acute medical problems The patient has baseline aphasia from prior stroke(s) The patient has baseline dementia and is not reliable historian The patient is in acute medical distress and unable to provide information Total of 12 systems reviewed as follows:   
   POSITIVE= underlined text  Negative = text not underlined General:  fever, chills, sweats, generalized weakness, weight loss/gain,  
   loss of appetite Eyes:    blurred vision, eye pain, loss of vision, double vision ENT:    rhinorrhea, pharyngitis Respiratory:   cough, sputum production, SOB, MARTINEZ, wheezing, pleuritic pain  
Cardiology:   chest pain, palpitations, orthopnea, PND, edema, syncope Gastrointestinal:  abdominal pain , N/V, diarrhea, dysphagia, constipation, bleeding Genitourinary:  frequency, urgency, dysuria, hematuria, incontinence Muskuloskeletal :  arthralgia, myalgia, back pain Hematology:  easy bruising, nose or gum bleeding, lymphadenopathy Dermatological: rash, ulceration, pruritis, color change / jaundice Endocrine:   hot flashes or polydipsia Neurological:  headache, dizziness, confusion, focal weakness, paresthesia, Speech difficulties, memory loss, gait difficulty Psychological: Feelings of anxiety, depression, agitation Objective: VITALS:   
Visit Vitals /81 Pulse 63 Temp 98 °F (36.7 °C) Resp 18 Ht 6' (1.829 m) Wt 176 lb 12.9 oz (80.2 kg) SpO2 100% BMI 23.98 kg/m² PHYSICAL EXAM: 
 
General:    Alert, cooperative, no distress, appears stated age. HEENT: Atraumatic, anicteric sclerae, pink conjunctivae No oral ulcers, mucosa moist, throat clear, dentition fair Neck:  Supple, symmetrical,  thyroid: non tender Lungs:   Clear to auscultation bilaterally. No Wheezing or Rhonchi. No rales. Chest wall:  No tenderness  No Accessory muscle use. Heart:   Regular  rhythm,  No  murmur   No edema Abdomen:   Soft, non-tender. Not distended. Bowel sounds normal 
Extremities: No cyanosis. No clubbing,   
  Skin turgor normal, Capillary refill normal, Radial dial pulse 2+ Skin:     Not pale. Not Jaundiced  No rashes Psych:  Good insight. Not depressed. Not anxious or agitated. Neurologic: EOMs intact. No facial asymmetry. Slow stuttering speech. Symmetrical strength, Sensation grossly intact. Alert and oriented X 4.  
 
_______________________________________________________________________ Care Plan discussed with: 
  Comments Patient x Family  x Wife, Dtr  
RN x Care Manager Consultant:  vivien ED physician  
_______________________________________________________________________ Expected  Disposition:  
Home with Family x HH/PT/OT/RN x  
SNF/LTC   
JULIO   
________________________________________________________________________ TOTAL TIME:  > 60 Minutes Critical Care Provided     Minutes non procedure based Comments  
 x Reviewed previous records  
>50% of visit spent in counseling and coordination of care x Discussion with patient and/or family and questions answered 
  
 
________________________________________________________________________ Signed: Fabian Fuentes,  
 
Procedures: see electronic medical records for all procedures/Xrays and details which were not copied into this note but were reviewed prior to creation of Plan. LAB DATA REVIEWED:   
Recent Results (from the past 24 hour(s)) GLUCOSE, POC Collection Time: 05/05/19  3:14 PM  
Result Value Ref Range Glucose (POC) 212 (H) 65 - 100 mg/dL Performed by Regi RIGGS, 12 LEAD, INITIAL  Collection Time: 05/05/19  3:36 PM  
 Result Value Ref Range Ventricular Rate 68 BPM  
 Atrial Rate 68 BPM  
 P-R Interval 160 ms QRS Duration 88 ms Q-T Interval 418 ms QTC Calculation (Bezet) 444 ms Calculated P Axis 35 degrees Calculated R Axis -4 degrees Calculated T Axis 5 degrees Diagnosis Sinus rhythm with frequent premature ventricular complexes Nonspecific T wave abnormality When compared with ECG of 30-DEC-2017 16:34, 
premature ventricular complexes are now present Vent. rate has decreased BY  33 BPM 
  
CBC WITH AUTOMATED DIFF Collection Time: 05/05/19  3:40 PM  
Result Value Ref Range WBC 5.8 4.1 - 11.1 K/uL  
 RBC 4.56 4.10 - 5.70 M/uL  
 HGB 13.9 12.1 - 17.0 g/dL HCT 41.5 36.6 - 50.3 % MCV 91.0 80.0 - 99.0 FL  
 MCH 30.5 26.0 - 34.0 PG  
 MCHC 33.5 30.0 - 36.5 g/dL  
 RDW 13.3 11.5 - 14.5 % PLATELET 248 657 - 586 K/uL MPV 10.4 8.9 - 12.9 FL  
 NRBC 0.0 0  WBC ABSOLUTE NRBC 0.00 0.00 - 0.01 K/uL NEUTROPHILS 79 (H) 32 - 75 % LYMPHOCYTES 13 12 - 49 % MONOCYTES 7 5 - 13 % EOSINOPHILS 1 0 - 7 % BASOPHILS 0 0 - 1 % IMMATURE GRANULOCYTES 0 0.0 - 0.5 % ABS. NEUTROPHILS 4.5 1.8 - 8.0 K/UL  
 ABS. LYMPHOCYTES 0.8 0.8 - 3.5 K/UL  
 ABS. MONOCYTES 0.4 0.0 - 1.0 K/UL  
 ABS. EOSINOPHILS 0.1 0.0 - 0.4 K/UL  
 ABS. BASOPHILS 0.0 0.0 - 0.1 K/UL  
 ABS. IMM. GRANS. 0.0 0.00 - 0.04 K/UL  
 DF AUTOMATED    
 RBC COMMENTS NORMOCYTIC, NORMOCHROMIC METABOLIC PANEL, BASIC Collection Time: 05/05/19  3:40 PM  
Result Value Ref Range Sodium 140 136 - 145 mmol/L Potassium 4.2 3.5 - 5.1 mmol/L Chloride 109 (H) 97 - 108 mmol/L  
 CO2 25 21 - 32 mmol/L Anion gap 6 5 - 15 mmol/L Glucose 177 (H) 65 - 100 mg/dL BUN 21 (H) 6 - 20 MG/DL Creatinine 1.79 (H) 0.70 - 1.30 MG/DL  
 BUN/Creatinine ratio 12 12 - 20 GFR est AA 45 (L) >60 ml/min/1.73m2 GFR est non-AA 37 (L) >60 ml/min/1.73m2 Calcium 9.2 8.5 - 10.1 MG/DL PROTHROMBIN TIME + INR  
 Collection Time: 05/05/19  3:40 PM  
Result Value Ref Range INR 1.1 0.9 - 1.1 Prothrombin time 11.4 (H) 9.0 - 11.1 sec TROPONIN I Collection Time: 05/05/19  3:40 PM  
Result Value Ref Range Troponin-I, Qt. <0.05 <0.05 ng/mL URINALYSIS W/ REFLEX CULTURE Collection Time: 05/05/19  4:23 PM  
Result Value Ref Range Color YELLOW/STRAW Appearance CLEAR CLEAR Specific gravity 1.023 1.003 - 1.030    
 pH (UA) 6.0 5.0 - 8.0 Protein 30 (A) NEG mg/dL Glucose 100 (A) NEG mg/dL Ketone NEGATIVE  NEG mg/dL Bilirubin NEGATIVE  NEG Blood NEGATIVE  NEG Urobilinogen 1.0 0.2 - 1.0 EU/dL Nitrites NEGATIVE  NEG Leukocyte Esterase NEGATIVE  NEG    
 WBC 0-4 0 - 4 /hpf  
 RBC 0-5 0 - 5 /hpf Epithelial cells FEW FEW /lpf Bacteria NEGATIVE  NEG /hpf  
 UA:UC IF INDICATED CULTURE NOT INDICATED BY UA RESULT CNI Hyaline cast 0-2 0 - 5 /lpf

## 2019-05-06 NOTE — ROUTINE PROCESS
* No surgery found * 
* No surgery found * Bedside and Verbal shift change report given to Ada SANTIAGO (oncoming nurse) by Maritza Eddy (offgoing nurse). Report included the following information SBAR, Kardex, MAR and Recent Results. Zone Phone:   [phone] Significant changes during shift:  Admit to [facility]STU Patient Information Darrian Nichols 69 y.o. 
5/5/2019  3:15 PM by Brodie Clinton DO. Darrian Nichols was admitted from Home 
 
Problem List 
 
Patient Active Problem List  
 Diagnosis Date Noted  TIA (transient ischemic attack) 05/05/2019 Past Medical History:  
Diagnosis Date  CAD (coronary artery disease)  Hypertension  Other ill-defined conditions(799.89)   
 parkinson's  Other ill-defined conditions(799.89)   
 high cholesterol  Stroke [facility]) 2006  
 left sided weakness Core Measures: CVA: Yes Yes/Observation status Activity Status: OOB to Chair No 
Ambulated this shift No  
Bed Rest Yes Supplemental O2: (If Applicable) NC No 
NRB No 
Venti-mask No 
On 0 Liters/min DVT prophylaxis: DVT prophylaxis Med- No 
DVT prophylaxis SCD or CARLOS- No  
 
Wounds: (If Applicable) Wounds- No 
 
Location 0 Patient Safety: 
 
Falls Score Total Score: 4 Safety Level_______ Bed Alarm On? Yes Sitter? No 
 
Plan for upcoming shift: TBD Discharge Plan: Yes TBD Active Consults: 
IP CONSULT TO HOSPITALIST

## 2019-05-06 NOTE — PROGRESS NOTES
**Consult Information** 
Member Facility: [address] [place] [address],[address] Facility MRN: [id number] Consult ID: [id number] Facility Time Zone: ET 
Date and Time of Consult: 05/05/2019 08:45:28 PM 
Requesting Clinician: Zina Cardenas Time of Call : 05/05/2019 08:50:00 PM 
Patient Name: Karol Frost Date of Birth: [birth year] Gender: Male **Clinical Note** Clinical Note: Pt passed swallow eval - Cardiac diet ordered.

## 2019-05-06 NOTE — PROGRESS NOTES
Problem: Self Care Deficits Care Plan (Adult) Goal: *Acute Goals and Plan of Care (Insert Text) Description Occupational Therapy Goals: 
Initiated 5/6/2019 1. Patient will perform grooming standing with supervision/set-up within 7 days. 2. Patient will perform toileting with supervision/set-up within 7 days. 3. Patient will perform lower body dressing with minimal assistance within 7 days. 4. Patient perform bathing standing with supervision/set up within 7 days. 5. Patient will transfer from toilet with supervision using the least restrictive device and appropriate durable medical equipment within 7 days. Outcome: Progressing Towards Goal 
 OCCUPATIONAL THERAPY EVALUATION Patient: Michelle Morgan (89 y.o. male) Date: 5/6/2019 Primary Diagnosis: TIA (transient ischemic attack) [G45.9] Precautions: fall  Bed Alarm ASSESSMENT : 
Based on the objective data described below, the patient presents with mild confusion and some memory loss and needed cues for safety. Upon arrival pt was seated at bedside chair with roll belt on and pt was wanting to get up and go to bathroom but was frustrated that he could not reach his cane. CGA for sit to stand and min assist for balance to mobilize to bathroom and managing door in standing. Min assist needed to safely align buttocks with commode and for problem solving managing depends down. Performed jame care seated with supervision and min assist for balance to pull depends over hips in standing. Stood at sink to wash and dry hands with SBA. At end of session pts significant other arrived and full PLF was obtained but pt was also able to assist with some information. Pt is generally weak and is impulsive. Pt is performing UB ADLS at a supervision to min assist level and lower body ADLs at a min to moderate assist level. Patient will benefit from skilled intervention to address the above impairments. Patients rehabilitation potential is considered to be Fair Factors which may influence rehabilitation potential include:  
? None noted ? Mental ability/status ? Medical condition ? Home/family situation and support systems ? Safety awareness ? Pain tolerance/management ? Other: PLAN : 
Recommendations and Planned Interventions: 
?                  Self Care Training                  ? Therapeutic Activities ? Functional Mobility Training    ? Cognitive Retraining 
? Therapeutic Exercises           ? Endurance Activities ? Balance Training                   ? Neuromuscular Re-Education ? Visual/Perceptual Training     ? Home Safety Training 
? Patient Education                 ? Family Training/Education ? Other (comment): Frequency/Duration: Patient will be followed by occupational therapy 4 times a week to address goals. Discharge Recommendations: Rehab Further Equipment Recommendations for Discharge: none SUBJECTIVE:  
Patient stated I need my cane over there. I need to go to the bathroom.  OBJECTIVE DATA SUMMARY:  
HISTORY:  
Past Medical History:  
Diagnosis Date  CAD (coronary artery disease)  Hypertension  Other ill-defined conditions(799.89)   
 parkinson's  Other ill-defined conditions(799.89)   
 high cholesterol  Stroke [facility]) 2006  
 left sided weakness Past Surgical History:  
Procedure Laterality Date  HX CATARACT REMOVAL Bilateral   
 HX HEART CATHETERIZATION  2006  DC COLONOSCOPY FLX DX W/COLLJ SPEC WHEN PFRMD  5/12/2014 Prior Level of Function/Environment/Context: furniture walks or uses SPC, has difficulty with directionality of clothing and needs assist with threading UB and LB clothing. Can don shoes on his own, does not wear depends (continent), bathes in shower without assist, able to self feed and groom on his own, will not use RW Expanded or extensive additional review of patient history:  
 
Home Situation Home Environment: Private residence # Steps to Enter: 3 Rails to Enter: Yes Hand Rails : Bilateral 
One/Two Story Residence: One story Living Alone: No 
Support Systems: Spouse/Significant Other/Partner, Child(scott) Patient Expects to be Discharged to[de-identified] Private residence Current DME Used/Available at Home: Charlanne Bienenstock, straight, Walker, rolling, Shower chair, Grab bars Tub or Shower Type: Shower Hand dominance: Right EXAMINATION OF PERFORMANCE DEFICITS: 
Cognitive/Behavioral Status: 
Neurologic State: Alert Orientation Level: Oriented X4 Cognition: Follows commands Perception: Appears intact Perseveration: No perseveration noted Safety/Judgement: Awareness of environment Hearing: Auditory Auditory Impairment: None Vision/Perceptual:   
Tracking: Able to track stimulus in all quadrants w/o difficulty Acuity: Within Defined Limits Range of Motion: 
 
AROM: Within functional limits PROM: Within functional limits Strength: 
 
Strength: Generally decreased, functional(BLE 4/5) Coordination: 
Coordination: Generally decreased, functional 
Fine Motor Skills-Upper: Left Intact; Right Intact Gross Motor Skills-Upper: Left Intact; Right Intact Balance: 
Sitting: Impaired; With support Sitting - Static: Good (unsupported) Sitting - Dynamic: Fair (occasional) Standing: Impaired Standing - Static: Fair Standing - Dynamic : Fair Functional Mobility and Transfers for ADLs: 
Bed Mobility: 
Rolling: Stand-by assistance Supine to Sit: Stand-by assistance Scooting: Stand-by assistance Transfers: 
Sit to Stand: Minimum assistance Functional Transfers Bathroom Mobility: Minimum assistance(with SPC) Toilet Transfer : Minimum assistance(assist to safely align self with commode) Bed to Chair: Minimum assistance ADL Assessment: 
Feeding: Supervision Oral Facial Hygiene/Grooming: Contact guard assistance(standing with verbal cues ) Bathing: Moderate assistance Upper Body Dressing: Minimum assistance Lower Body Dressing: Moderate assistance Toileting: Minimum assistance ADL Intervention and task modifications: 
  
See assessment Cognitive Retraining Safety/Judgement: Awareness of environment Functional Measure:  
 
 
 
Barthel Index: 
 
Bathin Bladder: 5 Bowels: 10 
Groomin Dressin Feedin Mobility: 0 Stairs: 0 Toilet Use: 5 Transfer (Bed to Chair and Back): 10 Total: 45/100 Percentage of impairment  
0% 1-19% 20-39% 40-59% 60-79% 80-99% 100% Barthel Score 0-100 100 99-80 79-60 59-40 20-39 1-19 
 0 The Barthel ADL Index: Guidelines 1. The index should be used as a record of what a patient does, not as a record of what a patient could do. 2. The main aim is to establish degree of independence from any help, physical or verbal, however minor and for whatever reason. 3. The need for supervision renders the patient not independent. 4. A patient's performance should be established using the best available evidence. Asking the patient, friends/relatives and nurses are the usual sources, but direct observation and common sense are also important. However direct testing is not needed. 5. Usually the patient's performance over the preceding 24-48 hours is important, but occasionally longer periods will be relevant. 6. Middle categories imply that the patient supplies over 50 per cent of the effort. 7. Use of aids to be independent is allowed. Vibha Mcghee., Barthel, D.W. (1671). Functional evaluation: the Barthel Index. [address] [facility] (14)2. KAMILA Soto, Maribel Kelsey, Don Meng., [place], [address] (1999). Measuring the change indisability after inpatient rehabilitation; comparison of the responsiveness of the Barthel Index and Functional [place] Measure. Journal of Neurology, Neurosurgery, and Psychiatry, 66(4), 418-133. AWA Ontiveros, NATALIE White, & Elyse Castro M.A. (2004.) Assessment of post-stroke quality of life in cost-effectiveness studies: The usefulness of the Barthel Index and the EuroQoL-5D. [facility], 13, 398-64 Occupational Therapy Evaluation Charge Determination History Examination Decision-Making MEDIUM Complexity : Expanded review of history including physical, cognitive and psychosocial  history  MEDIUM Complexity : 3-5 performance deficits relating to physical, cognitive , or psychosocial skils that result in activity limitations and / or participation restrictions MEDIUM Complexity : Patient may present with comorbidities that affect occupational performnce. Miniml to moderate modification of tasks or assistance (eg, physical or verbal ) with assesment(s) is necessary to enable patient to complete evaluation Based on the above components, the patient evaluation is determined to be of the following complexity level: MEDIUM Pain: 
  
  
  
 0/10 Activity Tolerance:  
 
Please refer to the flowsheet for vital signs taken during this treatment. After treatment:  
?  Patient left in no apparent distress sitting up in chair ? Patient left in no apparent distress in bed 
? Call bell left within reach ? Nursing notified ? Caregiver present ? Bed alarm activated, roll belt secured to pt and quick release tied to chair COMMUNICATION/EDUCATION:  
The patients plan of care was discussed with: Physical Therapist, Registered Nurse and patient and his wife . Patient was educated regarding his deficit(s) of confusion and dysarthria as this relates to possible his diagnosis of CVA. He demonstrated Fair understanding as evidenced by verbalization. Patient and/or family was verbally educated on the BE FAST acronym for signs/symptoms of CVA and TIA. BE FAST was written on patient's communication board  for visual education and reinforcement. All questions answered with patient indicating fair understanding. ? Home safety education was provided and the patient/caregiver indicated understanding. ? Patient/family have participated as able and agree with findings and recommendations. ?      Patient is unable to participate in plan of care at this time. This patients plan of care is appropriate for delegation to [facility]. Thank you for this referral. 
Mary Grande, OTR/L Time Calculation: 17 mins

## 2019-05-06 NOTE — ROUTINE PROCESS
TRANSFER - IN REPORT: 
 
Verbal report received from Orville Ricks (name) on Shayy Finders  being received from ED(unit) for routine progression of care Report consisted of patients Situation, Background, Assessment and  
Recommendations(SBAR). Information from the following report(s) SBAR, Kardex, ED Summary, [facility] and Recent Results was reviewed with the receiving nurse. Opportunity for questions and clarification was provided. Assessment completed upon patients arrival to unit and care assumed.

## 2019-05-06 NOTE — PROGRESS NOTES
Patient returned back from MRI, transported in bed by Ada SANTIAGO and Rosi Dunn. Arturo Muhammad 118.

## 2019-05-06 NOTE — PROGRESS NOTES
Oral and Written notification given to patient and/or caregiver informing them that they are currently an Outpatient receiving care in our facility. Outpatient services include Observation Services. Safia Freedman [phone]

## 2019-05-06 NOTE — ED NOTES
TRANSFER - OUT REPORT: 
 
Verbal report given to [facility] RN(name) on Anam Rowan  being transferred to NSTU(unit) for routine progression of care Report consisted of patients Situation, Background, Assessment and  
Recommendations(SBAR). Information from the following report(s) SBAR, ED Summary, [facility] and Recent Results was reviewed with the receiving nurse. Lines:  
Peripheral IV 05/05/19 Right Antecubital (Active) Site Assessment Clean, dry, & intact 5/5/2019  3:40 PM  
Phlebitis Assessment 0 5/5/2019  3:40 PM  
Infiltration Assessment 0 5/5/2019  3:40 PM  
Dressing Status Clean, dry, & intact 5/5/2019  3:40 PM  
Dressing Type Transparent 5/5/2019  3:40 PM  
Hub Color/Line Status Pink 5/5/2019  3:40 PM  
  
 
Opportunity for questions and clarification was provided. Patient transported with: 
 [employer]

## 2019-05-06 NOTE — PROGRESS NOTES
Bedside shift change report given to Reanna Heredia RN (oncoming nurse) by Shawna Shoemaker RN (offgoing nurse). Report included the following information SBAR, Kardex, MAR and Recent Results. Zone Phone:   [phone] Significant changes during shift:  Patient had MRI this afternoon. Results pending. Plan for upcoming shift: Patient safety. Discharge Plan: Yes SNF/Inpatient Rehab Active Consults: 
IP CONSULT TO HOSPITALIST 
IP CONSULT TO NEUROLOGY Patient Information Carolina Dodd 69 y.o. 
5/5/2019  3:15 PM by Jayna Bond DO. Carolina Dodd was admitted from Home 
 
Problem List 
 
Patient Active Problem List  
 Diagnosis Date Noted  Thrombotic stroke involving right middle cerebral artery ([facility] [place] [address] 05/06/2019  Bilateral carotid artery stenosis 05/06/2019  Left homonymous hemianopsia due to recent cerebral infarction 05/06/2019  Parkinson's disease (tremor, stiffness, slow motion, unstable posture) ([facility] [place] [address] 05/06/2019  Late onset Alzheimer's disease without behavioral disturbance 05/06/2019  TIA (transient ischemic attack) 05/05/2019 Past Medical History:  
Diagnosis Date  CAD (coronary artery disease)  Hypertension  Other ill-defined conditions(799.89)   
 parkinson's  Other ill-defined conditions(799.89)   
 high cholesterol  Stroke [facility]) 2006  
 left sided weakness * No surgery found * Core Measures: CVA: Yes CHF:No  
PNA:No  
 
Activity Status: OOB to Chair Yes Ambulated this shift Yes -to bathroom Bed Rest No 
 
Supplemental O2: (If Applicable) NC No 
On 0 Liters/min LINES AND DRAINS: 
 
Central Line? No  
 
PICC LINE? No  
 
Urinary Catheter? No  
DVT prophylaxis: DVT prophylaxis Med- Yes DVT prophylaxis SCD or CARLOS- No  
 
Wounds: (If Applicable) Wounds- No 
 
Location 0 Patient Safety: 
 
Falls Score Total Score: 5 Safety Level_______ Bed Alarm On? Yes- pt also has roll belt Sitter?  No

## 2019-05-06 NOTE — PROGRESS NOTES
Hospitalist Progress Note NAME: Jayne Gold :  1940 MRN:  [id number] Assessment / Plan: 
Acute large L cerebellar CVA with R vertebral/basilar occlusion in setting of prior large R MCA CVA with residual L sided weakness, prior L cerebellar CVA and Parkinson's disease with dementia: +blurred vision, difficulty walking with bilateral leg weakness and slower speech 
- CT head with chronic infarctions. No acute findings. 
- MRI brain with large acute infarct of the inferior left cerebellum - MRA brain with no flow demonstrated within the right vertebral artery and local stenosis flow is noted within the left vertebral artery. No flow is demonstrated within 
the basilar artery. 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 Fusiform dilatation of the left cavernous carotid. Marked narrowing of the right cavernous carotid with fusiform dilatation of the distal right internal carotid artery. Multifocal areas of stenosis in the middle cerebral arteries bilaterally. - check echo 
- check lipids in AM. Con't pravachol, dose increased. - change aggrenox to BID per neuro recs 
- neuro consult appreciated, sinemet dose adjusted per their recommendations 
- PT/OT recommending SAH, d/w CM who is sending ocnsult Essential HTN and CAD: normotensive 
- restart norvasc tomorrow AM 
- restarting metoprolol at decreased dose tomorrow 
- on aggrenox and statin as above Hyperglycemia: no h/o DM 
- check HgA1c in AM 
- consider additional medications pending result Presumed CKD3, at baseline:  UA reviewed with  protein, no bacteria Dementia with anxiety, depression: con't celexa 
  
Code Status: Full Surrogate Decision Maker: wife  
DVT Prophylaxis: heparin 
  
Baseline: ambulates with cane, lives at home with wife and duaghter Subjective: Chief Complaint / Reason for Physician Visit \"Feeling better today\". Improving, still weak beyond baseline.   Discussed with RN events overnight. Review of Systems: 
Symptom Y/N Comments  Symptom Y/N Comments Fever/Chills n   Chest Pain n   
Poor Appetite n   Edema n   
Cough n   Abdominal Pain n   
Sputum n   Joint Pain SOB/MARTINEZ n   Pruritis/Rash Nausea/vomit    Tolerating PT/OT Diarrhea    Tolerating Diet Constipation    Other Could NOT obtain due to:   
 
Objective: VITALS:  
Last 24hrs VS reviewed since prior progress note. Most recent are: 
Patient Vitals for the past 24 hrs: 
 Temp Pulse Resp BP SpO2  
05/06/19 1244 97.2 °F (36.2 °C) 61 18 137/82 97 % 05/06/19 0732 97.1 °F (36.2 °C) 60 18 134/64 97 % 05/06/19 0420 96.9 °F (36.1 °C) 62 18 165/78 100 % 05/05/19 2313 98 °F (36.7 °C) 63 18 147/81 100 % 05/05/19 2157 98.2 °F (36.8 °C) 69 18 169/84 99 % 05/05/19 2100  61  142/76 96 % 05/05/19 2000  62 15 142/73 97 % 05/05/19 1900  62 13 135/77 98 % 05/05/19 1800  64 15 128/76 97 % 05/05/19 1700  65 16 141/72 97 % 05/05/19 1600  66 16 150/72 97 % 05/05/19 1533  67 13 150/87 97 % 05/05/19 1509 98.7 °F (37.1 °C) 68 14  97 % Intake/Output Summary (Last 24 hours) at 5/6/2019 1324 Last data filed at 5/6/2019 2456 Gross per 24 hour Intake  Output 290 ml Net -290 ml PHYSICAL EXAM: 
General: WD, WN. Alert, cooperative, no acute distress   
EENT:  EOMI. Anicteric sclerae. MMM Resp:  CTA bilaterally, no wheezing or rales. No accessory muscle use CV:  Regular  rhythm,  No edema GI:  Soft, Non distended, Non tender.  +Bowel sounds Neurologic:  Alert and oriented X 3, normal speech, LLE weakness Psych:   Some insight. Not anxious nor agitated Skin:  No rashes. No jaundice Reviewed most current lab test results and cultures  YES Reviewed most current radiology test results   YES Review and summation of old records today    NO Reviewed patient's current orders and MAR    YES 
PMH/SH reviewed - no change compared to H&P 
 ________________________________________________________________________ Care Plan discussed with: 
  Comments Patient x Family  x Wife, dtr at bedside RN x Care Manager x Consultant Multidiciplinary team rounds were held today with , nursing, pharmacist and clinical coordinator. Patient's plan of care was discussed; medications were reviewed and discharge planning was addressed. ________________________________________________________________________ Total NON critical care TIME:  35 Minutes Total CRITICAL CARE TIME Spent:   Minutes non procedure based Comments >50% of visit spent in counseling and coordination of care x   
________________________________________________________________________ William Steve MD  
 
Procedures: see electronic medical records for all procedures/Xrays and details which were not copied into this note but were reviewed prior to creation of Plan. LABS: 
I reviewed today's most current labs and imaging studies. Pertinent labs include: 
Recent Labs 05/05/19 
1540 WBC 5.8 HGB 13.9 HCT 41.5  Recent Labs 05/06/19 
1047 05/05/19 
1540 NA  --  140 K  --  4.2 CL  --  109* CO2  --  25  
GLU  --  177* BUN  --  21* CREA  --  1.79* CA  --  9.2 MG 2.2  --   
INR  --  1.1 Signed: William Steve MD

## 2019-05-06 NOTE — PROGRESS NOTES
Speech Pathology bedside swallow evaluation/discharge Patient: Kristan Etienne (63 y.o. male) Date: 5/6/2019 Primary Diagnosis: TIA (transient ischemic attack) [G45.9] Precautions:   
 
ASSESSMENT : 
Based on the objective data described below, the patient presents with functional oropharyngeal swallow. Timely and complete mastication of solid. Pharyngeal swallow initiation is suspected to be timely and hyolaryngeal elevation/excursion functional via palpation. No overt s/s aspiration with successive straw sips of thin or solid. Skilled acute therapy provided by a speech-language pathologist is not indicated at this time. PLAN : 
Recommendations: 
-- regular diet/ thin liquids -- Will follow up pending MRI results to determine if integrated language eval is indicated Discharge Recommendations: None SUBJECTIVE:  
Patient stated I used to live in [facility]. OBJECTIVE:  
 
Past Medical History:  
Diagnosis Date  CAD (coronary artery disease)  Hypertension  Other ill-defined conditions(799.89)   
 parkinson's  Other ill-defined conditions(799.89)   
 high cholesterol  Stroke [facility]) 2006  
 left sided weakness Past Surgical History:  
Procedure Laterality Date  HX CATARACT REMOVAL Bilateral   
 HX HEART CATHETERIZATION  2006  DE COLONOSCOPY FLX DX W/COLLJ SPEC WHEN PFRMD  5/12/2014 Prior Level of Function/Home Situation:  
Home Situation Home Environment: Private residence # Steps to Enter: 3 Rails to Enter: Yes Hand Rails : Bilateral 
One/Two Story Residence: One story Living Alone: No 
Support Systems: Spouse/Significant Other/Partner, Child(scott) Patient Expects to be Discharged to[de-identified] Private residence Current DME Used/Available at Home: Cinderella Reaper, straight, Walker, rolling, Shower chair, Grab bars Tub or Shower Type: Shower Diet prior to admission: regular/thin Current Diet:  Regular/thin Cognitive and Communication Status: 
Neurologic State: Alert Orientation Level: Oriented X4 Cognition: Follows commands Perception: Appears intact Perseveration: No perseveration noted Safety/Judgement: Awareness of environment Oral Assessment: 
Oral Assessment Labial: No impairment Dentition: Natural 
Oral Hygiene: (clean, moist) Lingual: No impairment Velum: Unable to visualize Mandible: No impairment P.O. Trials: 
Patient Position: (up in chair) Vocal quality prior to P.O.: No impairment Consistency Presented: Thin liquid; Solid How Presented: Successive swallows;Straw;Self-fed/presented Bolus Acceptance: No impairment Bolus Formation/Control: No impairment Propulsion: No impairment Oral Residue: None Initiation of Swallow: No impairment Laryngeal Elevation: Functional 
Aspiration Signs/Symptoms: None Pharyngeal Phase Characteristics: No impairment, issues, or problems Effective Modifications: None Oral Phase Severity: No impairment Pharyngeal Phase Severity : No impairment NOMS:  
The NOMS functional outcome measure was used to quantify this patient's level of swallowing impairment. Based on the NOMS, the patient was determined to be at level 7 for swallow function NOMS Swallowing Levels: 
Level 1 (CN): NPO Level 2 (CM): NPO but takes consistency in therapy Level 3 (CL): Takes less than 50% of nutrition p.o. and continues with nonoral feedings; and/or safe with mod cues; and/or max diet restriction Level 4 (CK): Safe swallow but needs mod cues; and/or mod diet restriction; and/or still requires some nonoral feeding/supplements Level 5 (CJ): Safe swallow with min diet restriction; and/or needs min cues Level 6 (CI): Independent with p.o.; rare cues; usually self cues; may need to avoid some foods or needs extra time Level 7 (CH): Independent for all p.o. MANNIE. (2003). National Outcomes Measurement System (NOMS): Adult Speech-Language Pathology User's Guide. After treatment: [x] Patient left in no apparent distress sitting up in chair 
[] Patient left in no apparent distress in bed 
[x] Call bell left within reach [x] Nursing notified 
[] Caregiver present 
[] Bed alarm activated COMMUNICATION/EDUCATION:  
The patients plan of care including findings, recommendations, and recommended diet changes were discussed with: Registered Nurse. 
[] Posted safety precautions in patient's room. [x] Patient/family have participated as able and agree with findings and recommendations. [] Patient is unable to participate in plan of care at this time. Thank you for this referral. 
Kishan Ruby, SLP Time Calculation: 11 mins

## 2019-05-06 NOTE — ED NOTES
Dr. Jonathan Hernandez called to have cardiac diet placed, was having trouble placing the order. Told RN to put it under current attending if it was unable to be put under his name while the system is being fixed.

## 2019-05-06 NOTE — CONSULTS
Consult REFERRED BY: 
Murali Hernandez NP 
 
CHIEF COMPLAINT: Sudden blurred vision and bilateral leg weakness Subjective:  
 
Kristan Etienne is a 69 y.o. right-handed  male we are seeing as a new patient, for evaluation at the request of Dr. Rahul Kuhn of a new problem of sudden blurred vision and difficulty walking with bilateral leg weakness and slower speech we try to get out of bed yesterday morning. This apparently lasted several hours, the patient feels that he is back to his normal baseline. His CT scan on admission just shows chronic infarctions, no acute findings. He had a previous large right middle cerebral artery infarct from a stroke that occurred about 8 to 10 years ago and has had a residual left-sided weakness ever since. He also has a small area of ischemia in the left cerebellar hemisphere. He has diffuse white matter disease scattered throughout the brain. Patient also has history of Parkinson's disease is currently on Sinemet 50/200 mg once a day in the morning. Patient was currently on Aggrenox once a day and a baby aspirin once a day. He had not had any recurrent strokes since his last one 8 to 10 years ago. He is never seen a neurologist for his Parkinson's disease. He also appears to have a mild dementia because he is currently on Aricept 10 mg a day. He denies any unusual chest pain, palpitations, or other cardiac symptoms associated with a stroke. He said he is feeling much better now and his legs feel back to normal. 
He denies any major increase in back pain, neck pain, or bowel or bladder difficulty associated with this episode. He denies any unusual headache, double vision, falls, fever, meningismus, new medications, or cardiac symptoms as above. Past Medical History:  
Diagnosis Date  CAD (coronary artery disease)  Hypertension  Other ill-defined conditions(639.89)   
 parkinson's  Other ill-defined conditions(799.89) high cholesterol  Stroke [facility]) 2006  
 left sided weakness Past Surgical History:  
Procedure Laterality Date  HX CATARACT REMOVAL Bilateral   
 HX HEART CATHETERIZATION  2006  TX COLONOSCOPY FLX DX W/COLLJ SPEC WHEN PFRMD  5/12/2014 History reviewed. No pertinent family history. Social History Tobacco Use  Smoking status: Former Smoker  Smokeless tobacco: Never Used Substance Use Topics  Alcohol use: No  
   
 
Current Facility-Administered Medications:  
  sodium chloride (NS) flush 5-40 mL, 5-40 mL, IntraVENous, Q8H, Murphy Villalta, DO 
  sodium chloride (NS) flush 5-40 mL, 5-40 mL, IntraVENous, PRN, Murphy Villalta,  
  acetaminophen (TYLENOL) tablet 650 mg, 650 mg, Oral, Q4H PRN **OR** acetaminophen (TYLENOL) solution 650 mg, 650 mg, Per NG tube, Q4H PRN **OR** acetaminophen (TYLENOL) suppository 650 mg, 650 mg, Rectal, Q4H PRN, Murphy Villalta DO 
  heparin (porcine) injection 5,000 Units, 5,000 Units, SubCUTAneous, Q8H, Murphy Villalta DO, 5,000 Units at 05/06/19 1343   aspirin chewable tablet 81 mg, 81 mg, Oral, DAILY, Murphy Villalta Issa, DO, 81 mg at 05/06/19 1005   carbidopa-levodopa ER (SINEMET CR)  mg per tablet 1 Tab, 1 Tab, Oral, DAILY, Murphy Villalta DO, 1 Tab at 05/06/19 1005   citalopram (CELEXA) tablet 10 mg, 10 mg, Oral, QPM, Murphy Villalta DO 
  aspirin-dipyridamole (AGGRENOX)  mg per capsule 1 Cap, 1 Cap, Oral, DAILY, Murphy Villalta DO, 1 Cap at 05/06/19 1005   polyethylene glycol (MIRALAX) packet 17 g, 17 g, Oral, DAILY, Murphy Villalta DO, 17 g at 05/06/19 1005   raNITIdine (ZANTAC) 15 mg/mL syrup 150 mg, 150 mg, Oral, DAILY, Murphy Villalta DO, 150 mg at 05/06/19 1005   [Held by provider] amLODIPine (NORVASC) tablet 10 mg, 10 mg, Oral, DAILY, Jose Villalta DO 
   [Held by provider] metoprolol succinate (TOPROL-XL) XL tablet 100 mg, 100 mg, Oral, DAILY, Murphy Villalta DO 
  ondansetron [facility]) injection 4 mg, 4 mg, IntraVENous, Q6H PRN, Komal Barber MD 
  pravastatin (PRAVACHOL) tablet 80 mg, 80 mg, Oral, DAILY, Komal Babrer MD, 80 mg at 05/06/19 1005 No Known Allergies MRI Results (most recent): 
Results from Hospital Encounter encounter on 11/10/11 MRI BRAIN W AND W/O CONTRAST Narrative **Final Report** 
  
 
ICD Codes / Adm. Diagnosis: 781.2   / ABNORMALITY OF GAIT Examination:  MR BRAIN W AND WO CON  - [id number] - Nov 10 2011  8:40AM 
Accession No:  [id number] Reason:  ABNORMAL GAIT 
 
 
REPORT: 
INDICATION:   Abnormal gait, left-sided weakness, status post CVA, history  
of hypertension COMPARISON:  Brain MRI of 08/05/2006 TECHNIQUE:   
MR imaging of the brain was performed with sagittal T1, axial T1, T2, FLAIR,  
GRE, DWI/ADC; pre and post contrast multiplanar T1 utilizing 17 mL Magnevist.   
 
FINDINGS:   
 
There is a large area of chronic infarction involving the right posterior  
parietal and occipital lobes, which has evolved since the prior MRI, when it  
was acute. There is also a chronic right inferior cerebellar infarct. There  
is no evidence of acute infarction. Multiple chronic white matter infarcts  
are seen in the centrum semiovale, with a similar appearance. There are also  
a few chronic basal ganglia lacunar infarcts. The ventricles are normal in size, without evidence of hydrocephalus. There  
is ex vacuo dilatation of the atrium and temporal horn of the right lateral  
ventricle. There is no  intracranial hemorrhage  or extra-axial fluid  
collection. .   There is absence of signal void in the distal right  
vertebral artery, likely due to chronic occlusion. Normal flow-voids are  
seen in the distal carotid arteries and in the basilar artery. . There is no abnormal parenchymal or meningeal enhancement. The paranasal sinuses and  
mastoid air cells are clear. IMPRESSION:   
1. No acute findings. No masses or areas of abnormal enhancement. 2. Chronic areas of infarction in the right cerebellar hemisphere and in the  
right parietal and occipital lobes. Chronic white matter and basal ganglia  
lacunar infarcts. 3. Right vertebral artery occlusion. Signing/Reading Doctor: Raeann Cedeño ([id number]) Mahin Castellanos ([id number])  11/10/2011 Results from Hospital Encounter encounter on 11/10/11 MRI BRAIN W AND W/O CONTRAST Narrative **Final Report** 
  
 
ICD Codes / Adm. Diagnosis: 781.2   / ABNORMALITY OF GAIT Examination:  MR BRAIN W AND WO CON  - [id number] - Nov 10 2011  8:40AM 
Accession No:  [id number] Reason:  ABNORMAL GAIT 
 
 
REPORT: 
INDICATION:   Abnormal gait, left-sided weakness, status post CVA, history  
of hypertension COMPARISON:  Brain MRI of 08/05/2006 TECHNIQUE:   
MR imaging of the brain was performed with sagittal T1, axial T1, T2, FLAIR,  
GRE, DWI/ADC; pre and post contrast multiplanar T1 utilizing 17 mL Magnevist.   
 
FINDINGS:   
 
There is a large area of chronic infarction involving the right posterior  
parietal and occipital lobes, which has evolved since the prior MRI, when it  
was acute. There is also a chronic right inferior cerebellar infarct. There  
is no evidence of acute infarction. Multiple chronic white matter infarcts  
are seen in the centrum semiovale, with a similar appearance. There are also  
a few chronic basal ganglia lacunar infarcts. The ventricles are normal in size, without evidence of hydrocephalus. There  
is ex vacuo dilatation of the atrium and temporal horn of the right lateral  
ventricle. There is no  intracranial hemorrhage  or extra-axial fluid  
collection. .   There is absence of signal void in the distal right vertebral artery, likely due to chronic occlusion. Normal flow-voids are  
seen in the distal carotid arteries and in the basilar artery. . There is no  
abnormal parenchymal or meningeal enhancement. The paranasal sinuses and  
mastoid air cells are clear. IMPRESSION:   
1. No acute findings. No masses or areas of abnormal enhancement. 2. Chronic areas of infarction in the right cerebellar hemisphere and in the  
right parietal and occipital lobes. Chronic white matter and basal ganglia  
lacunar infarcts. 3. Right vertebral artery occlusion. Signing/Reading Doctor: Rohini Martinez ([id number]) Lowell Riley ([id number])  11/10/2011 Review of Systems: A comprehensive review of systems was negative except for: Constitutional: positive for fatigue and malaise Eyes: positive for visual disturbance Musculoskeletal: positive for myalgias, arthralgias and stiff joints Neurological: positive for memory problems, coordination problems and gait problems Behvioral/Psych: positive for Memory loss Vitals:  
 05/06/19 0534 05/06/19 0732 05/06/19 1244 05/06/19 1441 BP:  134/64 137/82 123/65 Pulse:  60 61 60 Resp:  18 18 18 Temp:  97.1 °F (36.2 °C) 97.2 °F (36.2 °C) 97.9 °F (36.6 °C) SpO2:  97% 97% 98% Weight: 171 lb 1.2 oz (77.6 kg) Height:      
 
Objective: I 
 
 
NEUROLOGICAL EXAM: 
 
Appearance: The patient is well developed, well nourished, provides a fair history and is in no acute distress. Mental Status: Oriented to time, place and person, and the president, cognitive function is low and probably mildly abnormal and speech is fluent and no aphasia or dysarthria. Mood and affect appropriate. Cranial Nerves:   LHH on visual fields. Fundi are benign, disc are flat, no lesions seen on funduscopy. NILE, EOM's full, no nystagmus, no ptosis. Facial sensation is normal. Corneal reflexes are not tested.  Facial movement is asymmetric on the left. Hearing is abnormal bilaterally. Palate is midline with normal sternocleidomastoid and trapezius muscles are normal. Tongue is midline. Neck without meningismus or bruits Temporal arteries are not tender or enlarged TMJ areas are not tender on palpation Motor:  4/5 strength in upper and lower proximal and distal muscles, but patient has a mild left-sided weakness and slight arm drift. Normal bulk and increased tone bilaterally, right side greater than left with cogwheel rigidity more on the right side as compared to the left. . No fasciculations. Rapid alternating movement is normal on the right, but decreased on the left. Reflexes:   Deep tendon reflexes 2+/4 on the left, and 1+/4 on the right No babinski or clonus present Sensory:   Normal to touch, pinprick and vibration and temperature decreased in both feet. DSS is intact Gait:  Not tested gait since patient has restraints on. Tremor:   No tremor noted. Cerebellar:  No abnormal cerebellar signs present on finger-nose-finger exam.  Romberg and tandem could not be tested Neurovascular:  Normal heart sounds and regular rhythm, peripheral pulses decreased, and no carotid bruits. Assessment: ICD-10-CM ICD-9-CM 1. Acute ischemic stroke (HCC) I63.9 434.91 Active Problems: 
  TIA (transient ischemic attack) (5/5/2019) Thrombotic stroke involving right middle cerebral artery ([facility] [address] (5/6/2019) Bilateral carotid artery stenosis (5/6/2019) Left homonymous hemianopsia due to recent cerebral infarction (5/6/2019) Parkinson's disease (tremor, stiffness, slow motion, unstable posture) ([facility] [address] (5/6/2019) Late onset Alzheimer's disease without behavioral disturbance (5/6/2019) Plan:  
 
Patient appears to have a mild left hemiparesis and left homonymous hemianopsia, and the question is this is new or old, and he needs an MRI scan. We will increase his Aggrenox to twice a day, and continue his Sinemet but increase the dose to 3 times a day of the lower version. Not sure the exact etiology of his spell yet. Difficult case, discussed with the patient and the family in detail, need to make sure there is no cardioembolic source, or other neurologic cause for his symptoms. Patient obviously a high risk for further neurological decompensation or worsening severe morbidity and possibly mortality if his disease of Alzheimer's disease, vascular disease is prominent, although untreated in addition to his Parkinson's disease Continue excellent medical care as you are, we will follow closely with you. Signed By: Summer Grande MD   
 May 6, 2019 CC: True Luna NP 
FAX: [phone]

## 2019-05-06 NOTE — ED NOTES
Attempted to call report. Was told that there were some issues going on and that the nursing supervisor was being contacted. Was told someone would call back ED.

## 2019-05-07 ENCOUNTER — APPOINTMENT (OUTPATIENT)
Dept: NON INVASIVE DIAGNOSTICS | Age: 79
DRG: 065 | End: 2019-05-07
Attending: INTERNAL MEDICINE
Payer: MEDICARE

## 2019-05-07 LAB
CHOLEST SERPL-MCNC: 170 MG/DL
CK SERPL-CCNC: 99 U/L (ref 39–308)
ECHO AO ROOT DIAM: 3.38 CM
ECHO AV AREA PEAK VELOCITY: 2.8 CM2
ECHO AV AREA VTI: 3.2 CM2
ECHO AV AREA/BSA PEAK VELOCITY: 1.4 CM2/M2
ECHO AV AREA/BSA VTI: 1.6 CM2/M2
ECHO AV MEAN GRADIENT: 2.9 MMHG
ECHO AV PEAK GRADIENT: 5.4 MMHG
ECHO AV PEAK VELOCITY: 115.89 CM/S
ECHO AV VTI: 20.5 CM
ECHO EST RA PRESSURE: 10 MMHG
ECHO LA AREA 2C: 16.65 CM2
ECHO LA AREA 4C: 15.4 CM2
ECHO LA MAJOR AXIS: 2.98 CM
ECHO LA TO AORTIC ROOT RATIO: 0.88
ECHO LA VOL 2C: 43.98 ML (ref 18–58)
ECHO LA VOL 4C: 31.46 ML (ref 18–58)
ECHO LA VOL BP: 43.5 ML (ref 18–58)
ECHO LA VOL/BSA BIPLANE: 21.52 ML/M2 (ref 16–28)
ECHO LA VOLUME INDEX A2C: 21.76 ML/M2 (ref 16–28)
ECHO LA VOLUME INDEX A4C: 15.56 ML/M2 (ref 16–28)
ECHO LV E' LATERAL VELOCITY: 5.75 CM/S
ECHO LV E' SEPTAL VELOCITY: 3.45 CM/S
ECHO LV INTERNAL DIMENSION DIASTOLIC: 4.67 CM (ref 4.2–5.9)
ECHO LV INTERNAL DIMENSION SYSTOLIC: 3.51 CM
ECHO LV IVSD: 1.41 CM (ref 0.6–1)
ECHO LV MASS 2D: 275.2 G (ref 88–224)
ECHO LV MASS INDEX 2D: 136.1 G/M2 (ref 49–115)
ECHO LV POSTERIOR WALL DIASTOLIC: 1.16 CM (ref 0.6–1)
ECHO LVOT DIAM: 2.14 CM
ECHO LVOT PEAK GRADIENT: 3.3 MMHG
ECHO LVOT PEAK VELOCITY: 91.14 CM/S
ECHO LVOT SV: 65.2 ML
ECHO LVOT VTI: 18.15 CM
ECHO MV A VELOCITY: 74.46 CM/S
ECHO MV AREA VTI: 3.5 CM2
ECHO MV E DECELERATION TIME (DT): 382.7 MS
ECHO MV E VELOCITY: 34.43 CM/S
ECHO MV E/A RATIO: 0.46
ECHO MV E/E' LATERAL: 5.99
ECHO MV E/E' RATIO (AVERAGED): 7.98
ECHO MV E/E' SEPTAL: 9.98
ECHO MV MAX VELOCITY: 76.95 CM/S
ECHO MV MEAN GRADIENT: 0.7 MMHG
ECHO MV PEAK GRADIENT: 2.4 MMHG
ECHO MV REGURGITANT PEAK GRADIENT: 115.9 MMHG
ECHO MV REGURGITANT PEAK VELOCITY: 538.39 CM/S
ECHO MV VTI: 18.75 CM
ECHO PULMONARY ARTERY SYSTOLIC PRESSURE (PASP): 22.8 MMHG
ECHO PV MAX VELOCITY: 78.2 CM/S
ECHO PV PEAK GRADIENT: 2.4 MMHG
ECHO RA AREA 4C: 12.86 CM2
ECHO RIGHT VENTRICULAR SYSTOLIC PRESSURE (RVSP): 22.8 MMHG
ECHO RV INTERNAL DIMENSION: 2.32 CM
ECHO TV REGURGITANT MAX VELOCITY: 178.86 CM/S
ECHO TV REGURGITANT PEAK GRADIENT: 12.8 MMHG
ERYTHROCYTE [SEDIMENTATION RATE] IN BLOOD: 37 MM/HR (ref 0–20)
EST. AVERAGE GLUCOSE BLD GHB EST-MCNC: 123 MG/DL
FOLATE SERPL-MCNC: 11.9 NG/ML (ref 5–21)
HBA1C MFR BLD: 5.9 % (ref 4.2–6.3)
HCYS SERPL-SCNC: 12.2 UMOL/L (ref 3.7–13.9)
HDLC SERPL-MCNC: 44 MG/DL
HDLC SERPL: 3.9 {RATIO} (ref 0–5)
LDLC SERPL CALC-MCNC: 112.8 MG/DL (ref 0–100)
LIPID PROFILE,FLP: ABNORMAL
TRIGL SERPL-MCNC: 66 MG/DL (ref ?–150)
VIT B12 SERPL-MCNC: 652 PG/ML (ref 193–986)
VLDLC SERPL CALC-MCNC: 13.2 MG/DL

## 2019-05-07 PROCEDURE — 82746 ASSAY OF FOLIC ACID SERUM: CPT

## 2019-05-07 PROCEDURE — 83036 HEMOGLOBIN GLYCOSYLATED A1C: CPT

## 2019-05-07 PROCEDURE — 74011250637 HC RX REV CODE- 250/637: Performed by: INTERNAL MEDICINE

## 2019-05-07 PROCEDURE — 74011250636 HC RX REV CODE- 250/636: Performed by: INTERNAL MEDICINE

## 2019-05-07 PROCEDURE — 97116 GAIT TRAINING THERAPY: CPT

## 2019-05-07 PROCEDURE — 93306 TTE W/DOPPLER COMPLETE: CPT

## 2019-05-07 PROCEDURE — 36415 COLL VENOUS BLD VENIPUNCTURE: CPT

## 2019-05-07 PROCEDURE — 82550 ASSAY OF CK (CPK): CPT

## 2019-05-07 PROCEDURE — 85652 RBC SED RATE AUTOMATED: CPT

## 2019-05-07 PROCEDURE — 74011250637 HC RX REV CODE- 250/637: Performed by: PSYCHIATRY & NEUROLOGY

## 2019-05-07 PROCEDURE — 82607 VITAMIN B-12: CPT

## 2019-05-07 PROCEDURE — 83090 ASSAY OF HOMOCYSTEINE: CPT

## 2019-05-07 PROCEDURE — 97535 SELF CARE MNGMENT TRAINING: CPT | Performed by: OCCUPATIONAL THERAPIST

## 2019-05-07 PROCEDURE — 80061 LIPID PANEL: CPT

## 2019-05-07 PROCEDURE — 97530 THERAPEUTIC ACTIVITIES: CPT | Performed by: OCCUPATIONAL THERAPIST

## 2019-05-07 PROCEDURE — 65660000000 HC RM CCU STEPDOWN

## 2019-05-07 RX ORDER — SODIUM CHLORIDE 9 MG/ML
75 INJECTION, SOLUTION INTRAVENOUS CONTINUOUS
Status: DISCONTINUED | OUTPATIENT
Start: 2019-05-07 | End: 2019-05-10 | Stop reason: HOSPADM

## 2019-05-07 RX ADMIN — Medication 10 ML: at 21:34

## 2019-05-07 RX ADMIN — FAMOTIDINE 20 MG: 20 TABLET ORAL at 09:26

## 2019-05-07 RX ADMIN — CARBIDOPA AND LEVODOPA 1 TABLET: 25; 100 TABLET ORAL at 17:08

## 2019-05-07 RX ADMIN — ASPIRIN AND DIPYRIDAMOLE 1 CAPSULE: 25; 200 CAPSULE, EXTENDED RELEASE ORAL at 17:08

## 2019-05-07 RX ADMIN — CARBIDOPA AND LEVODOPA 1 TABLET: 25; 100 TABLET ORAL at 09:26

## 2019-05-07 RX ADMIN — ASPIRIN AND DIPYRIDAMOLE 1 CAPSULE: 25; 200 CAPSULE, EXTENDED RELEASE ORAL at 09:26

## 2019-05-07 RX ADMIN — SODIUM CHLORIDE 75 ML/HR: 900 INJECTION, SOLUTION INTRAVENOUS at 21:38

## 2019-05-07 RX ADMIN — CITALOPRAM HYDROBROMIDE 10 MG: 20 TABLET ORAL at 17:08

## 2019-05-07 RX ADMIN — CARBIDOPA AND LEVODOPA 1 TABLET: 25; 100 TABLET ORAL at 21:33

## 2019-05-07 RX ADMIN — PRAVASTATIN SODIUM 80 MG: 40 TABLET ORAL at 09:26

## 2019-05-07 RX ADMIN — AMLODIPINE BESYLATE 10 MG: 5 TABLET ORAL at 09:26

## 2019-05-07 RX ADMIN — METOPROLOL SUCCINATE 25 MG: 25 TABLET, EXTENDED RELEASE ORAL at 09:26

## 2019-05-07 RX ADMIN — HEPARIN SODIUM 5000 UNITS: 5000 INJECTION INTRAVENOUS; SUBCUTANEOUS at 12:18

## 2019-05-07 RX ADMIN — HEPARIN SODIUM 5000 UNITS: 5000 INJECTION INTRAVENOUS; SUBCUTANEOUS at 21:33

## 2019-05-07 NOTE — PROGRESS NOTES
Reason for Admission:   Patient came to ed for complaint of generalized weakness and slurred speech. He has hx residual left sided weakness in the past. Patient states he lives with his friend who he says might as well be his wife since they have been together for long time. He states he was independent prior to coming to the hospital and was ambulating with a cane at home. He does not drive. His friend takes him to appointments. RRAT Score:     18 Do you (patient/family) have any concerns for transition/discharge? No 
           
Plan for utilizing home health:   Patient has utilized home health services in the past. He could not remember the name of the  agency. He has been to inpatient [facility] in the past.  
 
 
Current Advanced Directive/Advance Care Plan:  Not on file. Likelihood of readmission? Moderate Transition of Care Plan:    Spoke with patient re dcluigi. He would like to go to rehab prior to going home when medically stable. He wants to go to [address] [place] [address] . Referral sent to them and will await their response. Patient states it is fine to talk with his friend and attempted to call her but no answer. Care Management Interventions PCP Verified by CM: Yes Transition of Care Consult (CM Consult): Discharge Planning Discharge Durable Medical Equipment: (Patient has cane and walker at home. ) Physical Therapy Consult: Yes Occupational Therapy Consult: Yes Speech Therapy Consult: Yes Current Support Network: Other(Lives with friend. ) Confirm Follow Up Transport: Friends Plan discussed with Pt/Family/Caregiver: Yes Discharge Location Discharge Placement: Unable to determine at this time

## 2019-05-07 NOTE — PROGRESS NOTES
Bedside shift change report given to Ada RN (oncoming nurse) by Sherren Laud RN (offgoing nurse). Report included the following information SBAR, Kardex, MAR and Recent Results. Zone Phone:   [phone] Significant changes during shift:  None Plan for upcoming shift: Patient safety. Discharge Plan: Yes SNF/Inpatient Rehab Active Consults: 
IP CONSULT TO HOSPITALIST 
IP CONSULT TO NEUROLOGY Patient Information Alison Clements 69 y.o. 
5/5/2019  3:15 PM by Queen Raya MD. Alison Clements was admitted from Home 
 
Problem List 
 
Patient Active Problem List  
 Diagnosis Date Noted  Thrombotic stroke involving right middle cerebral artery ([facility] [address] 05/06/2019  Bilateral carotid artery stenosis 05/06/2019  Left homonymous hemianopsia due to recent cerebral infarction 05/06/2019  Parkinson's disease (tremor, stiffness, slow motion, unstable posture) ([facility] [address] 05/06/2019  Late onset Alzheimer's disease without behavioral disturbance 05/06/2019  Stroke (cerebrum) ([facility] [address] 05/06/2019  TIA (transient ischemic attack) 05/05/2019 Past Medical History:  
Diagnosis Date  CAD (coronary artery disease)  Hypertension  Other ill-defined conditions(799.89)   
 parkinson's  Other ill-defined conditions(799.89)   
 high cholesterol  Stroke [facility]) 2006  
 left sided weakness * No surgery found * Core Measures: CVA: Yes CHF:No  
PNA:No  
 
Activity Status: OOB to Chair Yes Ambulated this shift Yes -to bathroom Bed Rest No 
 
Supplemental O2: (If Applicable) NC No 
On 0 Liters/min LINES AND DRAINS: 
 
Central Line? No  
 
PICC LINE? No  
 
Urinary Catheter? No  
DVT prophylaxis: DVT prophylaxis Med- Yes DVT prophylaxis SCD or CARLOS- No  
 
Wounds: (If Applicable) Wounds- No 
 
Location 0 Patient Safety: 
 
Falls Score Total Score: 5 Safety Level_______ Bed Alarm On? Yes- pt also has roll belt Sitter?  No

## 2019-05-07 NOTE — PROGRESS NOTES
Problem: Mobility Impaired (Adult and Pediatric) Goal: *Acute Goals and Plan of Care (Insert Text) Description Physical Therapy Goals Initiated 5/6/2019 1. Patient will move from supine to sit and sit to supine , scoot up and down and roll side to side in bed with modified independence within 7 day(s). 2.  Patient will transfer from bed to chair and chair to bed with minimal assistance/contact guard assist using the least restrictive device within 7 day(s). 3.  Patient will perform sit to stand with minimal assistance/contact guard assist within 7 day(s). 4.  Patient will ambulate with minimal assistance/contact guard assist for 100 feet with the least restrictive device within 7 day(s). 5.  Patient will ascend/descend 3 stairs with 2 handrail(s) with minimal assistance/contact guard assist within 7 day(s). 6.  Patient will improve Mike Balance score by 7 points within 7 days. Outcome: Progressing Towards Goal 
 PHYSICAL THERAPY TREATMENT Patient: Romelia Olvera (36 y.o. male) Date: 5/7/2019 Diagnosis: TIA (transient ischemic attack) [G45.9] Stroke (cerebrum) (H[facility]) [I63.9] <principal problem not specified> Precautions: Bed Alarm, Fall Chart, physical therapy assessment, plan of care and goals were reviewed. ASSESSMENT: 
Pt received supine in bed and agreeable to PT intervention. Pt cleared by nursing for mobility. Patient with slow progress towards therapy goals this date, however continues to demonstrate good participation throughout. No pain complaints. He continues to require SBA and increased time with HOB elevated for bed mobility. Static sitting balance intact. Needs min A and increased time to stand from EOB with use of SPC. He ambulated to/from the bathroom and then additional 39' with min-mod A x 1-2 and SPC. Demonstrates marked R lateral lean/weight shift throughout gait and with R knee in flexed position during stance phase of gait due to weakness.  He stood at sink x 2 minutes to participate in ADLs, however needed min/CGA x 1 for balance due to continued R lateral lean/weight shift. Needs manual cueing at hips during gait to facilitate midline positioning and increased weight shift to L side. Gait distance limited due to increased fatigue and weakness with activity. He needed MAX cueing and increased assistance to transfer safely into recliner chair as patient reaching for chair and attempting to sit prior to turning completely around. Pt was left sitting in bedside chair with all needs met, RN aware, and roll belt in place following therapy session. Continue to recommend patient discharge to inpatient rehab to improve functional mobility and independence prior to returning home as patient is a high fall risk and with decline from baseline mobility. Progression toward goals: 
?    Improving appropriately and progressing toward goals ? Improving slowly and progressing toward goals ? Not making progress toward goals and plan of care will be adjusted PLAN: 
Patient continues to benefit from skilled intervention to address the above impairments. Continue treatment per established plan of care. Discharge Recommendations:  Inpatient Rehab Further Equipment Recommendations for Discharge:  TBD by rehab SUBJECTIVE:  
Patient stated I normally reach for the chair.  OBJECTIVE DATA SUMMARY:  
Critical Behavior: 
Neurologic State: Alert, Confused Orientation Level: Oriented to time, Oriented to person, Disoriented to situation, Disoriented to place Cognition: Follows commands Safety/Judgement: Fall prevention, Decreased awareness of need for safety, Decreased insight into deficits Functional Mobility Training: 
Bed Mobility: 
Rolling: Stand-by assistance Supine to Sit: Stand-by assistance Scooting: Stand-by assistance Transfers: 
Sit to Stand: Minimum assistance Stand to Sit: Minimum assistance Bed to Chair: Minimum assistance Balance: 
Sitting: Impaired Sitting - Static: Good (unsupported) Sitting - Dynamic: Fair (occasional) Standing: Impaired; With support Standing - Static: Fair;Constant support Standing - Dynamic : Poor Ambulation/Gait Training: 
Distance (ft): 70 Feet (ft) Assistive Device: Gait belt;Cane, straight Ambulation - Level of Assistance: Moderate assistance;Minimal assistance;Assist x2; Additional time; Adaptive equipment Gait Abnormalities: Decreased step clearance; Altered arm swing;Path deviations; Shuffling gait; Step to gait Base of Support: Shift to right;Narrowed Speed/Marcela: Pace decreased (<100 feet/min); Shuffled Step Length: Left shortened;Right shortened Neuro Re-Education: 
Manual cueing at hips to improve midline positioning and weight shifting during gait Pain: 
Pain Scale 1: Numeric (0 - 10) Pain Intensity 1: 0 Activity Tolerance:  
Fair - increased fatigue with activity; no pain complaints Please refer to the flowsheet for vital signs taken during this treatment. After treatment:  
?    Patient left in no apparent distress sitting up in chair ? Patient left in no apparent distress in bed 
? Call bell left within reach ? Nursing notified ? Caregiver present ? Bed alarm activated COMMUNICATION/COLLABORATION:  
The patients plan of care was discussed with: Physical Therapist, Occupational Therapist and Registered Nurse Libby Fischer PT, DPT Time Calculation: 16 mins

## 2019-05-07 NOTE — PROGRESS NOTES
Speech pathology note Reviewed chart and note MRI showed large acute infarct of the inferior left cerebellum, as well as chronic encephalomalacia of the right parietal, occipital and posterior temporal lobes. Patient also has Parkinson's disease and dementia. Given L cerebellar CVA does not typically result in integrated language impairment, suspect integrated language deficits are related to old CVA and dementia. Additional SLP evaluation not indicated at this time, and will sign off. SLP evaluated swallow function yesterday which was [facility]. Thank you. Marie Merino, Cash Jewell., CCC-SLP

## 2019-05-07 NOTE — PROGRESS NOTES
Problem: Self Care Deficits Care Plan (Adult) Goal: *Acute Goals and Plan of Care (Insert Text) Description Occupational Therapy Goals: 
Initiated 5/6/2019 1. Patient will perform grooming standing with supervision/set-up within 7 days. 2. Patient will perform toileting with supervision/set-up within 7 days. 3. Patient will perform lower body dressing with minimal assistance within 7 days. 4. Patient perform bathing standing with supervision/set up within 7 days. 5. Patient will transfer from toilet with supervision using the least restrictive device and appropriate durable medical equipment within 7 days. Outcome: Progressing Towards Goal 
 OCCUPATIONAL THERAPY TREATMENT Patient: Maddie Ramsey (42 y.o. male) Date: 5/7/2019 Diagnosis: Stroke (cerebrum) ([facility]) [I63.9] <principal problem not specified>, Large acute infarct of the inferior left cerebellum Precautions: Bed Alarm Chart, occupational therapy assessment, plan of care, and goals were reviewed. ASSESSMENT: 
Pt was supine upon arrival.  Was able to more formally test vision and UE today as pt was not stressed and distracted about needing to use the restroom. Pt does have left homonomous hemianopsia and pt reports left eye has lower vision at baseline. SBA for supine to sit and to scoot to EOB. Pt does have left UE old hemiparesis but is functional. Flexor synergy noted in left UE with mobility. Mobilized around bed to bathroom with SPC with moderate assist due to right lean. Stood at sink to wash face pt pt stands laterally to sink to compensate for vision loss. Pt needed min physical cues to wash left lower face and pt had food on left lateral side of face. Continue to recommend inpatient rehab. Progression toward goals: 
?       Improving appropriately and progressing toward goals ? Improving slowly and progressing toward goals ? Not making progress toward goals and plan of care will be adjusted PLAN: 
Patient continues to benefit from skilled intervention to address the above impairments. Continue treatment per established plan of care. Discharge Recommendations:  Inpatient Rehab Further Equipment Recommendations for Discharge:  TBD SUBJECTIVE:  
Patient stated ? I can't see out of my eye.? OBJECTIVE DATA SUMMARY:  
Cognitive/Behavioral Status: 
  
  
  
Perception: Appears intact Perseveration: Perseverates during conversation Safety/Judgement: Fall prevention;Decreased awareness of need for safety;Decreased insight into deficits Functional Mobility and Transfers for ADLs: 
Bed Mobility: 
Rolling: Stand-by assistance Supine to Sit: Stand-by assistance Scooting: Stand-by assistance Transfers: 
Sit to Stand: Minimum assistance Functional Transfers Bathroom Mobility: Minimum assistance; Moderate assistance(with SPC, right lateral lean) Bed to Chair: Minimum assistance Balance: 
Sitting: Impaired Sitting - Static: Good (unsupported) Sitting - Dynamic: Fair (occasional) Standing: Impaired; With support Standing - Static: Fair;Constant support Standing - Dynamic : Poor ADL Intervention: 
  
 
Grooming Washing Face: Moderate assistance(needs cues for throughness, min assist for balance) Cognitive Retraining Safety/Judgement: Fall prevention;Decreased awareness of need for safety;Decreased insight into deficits Neuro Re-Education: 
 Manual facilitation and cues for midline standing during mobility and ADLS Pain: 
Pain Scale 1: Numeric (0 - 10) Pain Intensity 1: 0 Activity Tolerance:  
Please refer to the flowsheet for vital signs taken during this treatment. After treatment:  
? Patient left in no apparent distress sitting up in chair, roll belt secured to chair with quick release ties and velcro'd around pts waist 
? Patient left in no apparent distress in bed 
? Call bell left within reach ? Nursing notified ? Caregiver present ? Bed alarm activated COMMUNICATION/COLLABORATION:  
The patient?s plan of care was discussed with: Physical Therapist, Registered Nurse and patient Ivet Ramsey, OTR/L Time Calculation: 23 mins

## 2019-05-08 ENCOUNTER — APPOINTMENT (OUTPATIENT)
Dept: CT IMAGING | Age: 79
DRG: 065 | End: 2019-05-08
Attending: INTERNAL MEDICINE
Payer: MEDICARE

## 2019-05-08 PROBLEM — I67.1 CEREBRAL ANEURYSM WITHOUT RUPTURE: Status: ACTIVE | Noted: 2019-05-08

## 2019-05-08 LAB
ALBUMIN SERPL-MCNC: 3.5 G/DL (ref 3.5–5)
ALBUMIN/GLOB SERPL: 0.9 {RATIO} (ref 1.1–2.2)
ALP SERPL-CCNC: 82 U/L (ref 45–117)
ALT SERPL-CCNC: 6 U/L (ref 12–78)
ANION GAP SERPL CALC-SCNC: 7 MMOL/L (ref 5–15)
AST SERPL-CCNC: 17 U/L (ref 15–37)
BASOPHILS # BLD: 0 K/UL (ref 0–0.1)
BASOPHILS NFR BLD: 0 % (ref 0–1)
BILIRUB SERPL-MCNC: 0.6 MG/DL (ref 0.2–1)
BUN SERPL-MCNC: 16 MG/DL (ref 6–20)
BUN/CREAT SERPL: 9 (ref 12–20)
CALCIUM SERPL-MCNC: 8.5 MG/DL (ref 8.5–10.1)
CHLORIDE SERPL-SCNC: 109 MMOL/L (ref 97–108)
CO2 SERPL-SCNC: 24 MMOL/L (ref 21–32)
CREAT SERPL-MCNC: 1.78 MG/DL (ref 0.7–1.3)
DIFFERENTIAL METHOD BLD: NORMAL
EOSINOPHIL # BLD: 0.1 K/UL (ref 0–0.4)
EOSINOPHIL NFR BLD: 1 % (ref 0–7)
ERYTHROCYTE [DISTWIDTH] IN BLOOD BY AUTOMATED COUNT: 13.2 % (ref 11.5–14.5)
GLOBULIN SER CALC-MCNC: 4.1 G/DL (ref 2–4)
GLUCOSE SERPL-MCNC: 146 MG/DL (ref 65–100)
HCT VFR BLD AUTO: 41 % (ref 36.6–50.3)
HGB BLD-MCNC: 13.6 G/DL (ref 12.1–17)
IGA SERPL-MCNC: 645 MG/DL (ref 61–437)
IGG SERPL-MCNC: 999 MG/DL (ref 700–1600)
IGM SERPL-MCNC: 64 MG/DL (ref 15–143)
IMM GRANULOCYTES # BLD AUTO: 0 K/UL (ref 0–0.04)
IMM GRANULOCYTES NFR BLD AUTO: 0 % (ref 0–0.5)
LYMPHOCYTES # BLD: 1.1 K/UL (ref 0.8–3.5)
LYMPHOCYTES NFR BLD: 23 % (ref 12–49)
MCH RBC QN AUTO: 30.4 PG (ref 26–34)
MCHC RBC AUTO-ENTMCNC: 33.2 G/DL (ref 30–36.5)
MCV RBC AUTO: 91.5 FL (ref 80–99)
MONOCYTES # BLD: 0.4 K/UL (ref 0–1)
MONOCYTES NFR BLD: 8 % (ref 5–13)
NEUTS SEG # BLD: 3.3 K/UL (ref 1.8–8)
NEUTS SEG NFR BLD: 68 % (ref 32–75)
NRBC # BLD: 0 K/UL (ref 0–0.01)
NRBC BLD-RTO: 0 PER 100 WBC
PLATELET # BLD AUTO: 213 K/UL (ref 150–400)
PMV BLD AUTO: 10.7 FL (ref 8.9–12.9)
POTASSIUM SERPL-SCNC: 3.7 MMOL/L (ref 3.5–5.1)
PROT PATTERN SERPL IFE-IMP: ABNORMAL
PROT SERPL-MCNC: 7.6 G/DL (ref 6.4–8.2)
RBC # BLD AUTO: 4.48 M/UL (ref 4.1–5.7)
SODIUM SERPL-SCNC: 140 MMOL/L (ref 136–145)
WBC # BLD AUTO: 4.9 K/UL (ref 4.1–11.1)

## 2019-05-08 PROCEDURE — 97116 GAIT TRAINING THERAPY: CPT

## 2019-05-08 PROCEDURE — 97530 THERAPEUTIC ACTIVITIES: CPT | Performed by: OCCUPATIONAL THERAPIST

## 2019-05-08 PROCEDURE — 74011250637 HC RX REV CODE- 250/637: Performed by: INTERNAL MEDICINE

## 2019-05-08 PROCEDURE — 80053 COMPREHEN METABOLIC PANEL: CPT

## 2019-05-08 PROCEDURE — 97112 NEUROMUSCULAR REEDUCATION: CPT | Performed by: OCCUPATIONAL THERAPIST

## 2019-05-08 PROCEDURE — 74011250637 HC RX REV CODE- 250/637: Performed by: PSYCHIATRY & NEUROLOGY

## 2019-05-08 PROCEDURE — 97112 NEUROMUSCULAR REEDUCATION: CPT

## 2019-05-08 PROCEDURE — 74011250636 HC RX REV CODE- 250/636: Performed by: INTERNAL MEDICINE

## 2019-05-08 PROCEDURE — 36415 COLL VENOUS BLD VENIPUNCTURE: CPT

## 2019-05-08 PROCEDURE — 85025 COMPLETE CBC W/AUTO DIFF WBC: CPT

## 2019-05-08 PROCEDURE — 97535 SELF CARE MNGMENT TRAINING: CPT | Performed by: OCCUPATIONAL THERAPIST

## 2019-05-08 PROCEDURE — 65660000000 HC RM CCU STEPDOWN

## 2019-05-08 RX ADMIN — Medication 10 ML: at 21:33

## 2019-05-08 RX ADMIN — CARBIDOPA AND LEVODOPA 1 TABLET: 25; 100 TABLET ORAL at 21:31

## 2019-05-08 RX ADMIN — METOPROLOL SUCCINATE 25 MG: 25 TABLET, EXTENDED RELEASE ORAL at 09:17

## 2019-05-08 RX ADMIN — ASPIRIN AND DIPYRIDAMOLE 1 CAPSULE: 25; 200 CAPSULE, EXTENDED RELEASE ORAL at 17:31

## 2019-05-08 RX ADMIN — PRAVASTATIN SODIUM 80 MG: 40 TABLET ORAL at 09:18

## 2019-05-08 RX ADMIN — HEPARIN SODIUM 5000 UNITS: 5000 INJECTION INTRAVENOUS; SUBCUTANEOUS at 12:05

## 2019-05-08 RX ADMIN — POLYETHYLENE GLYCOL 3350 17 G: 17 POWDER, FOR SOLUTION ORAL at 09:17

## 2019-05-08 RX ADMIN — FAMOTIDINE 20 MG: 20 TABLET ORAL at 09:17

## 2019-05-08 RX ADMIN — CARBIDOPA AND LEVODOPA 1 TABLET: 25; 100 TABLET ORAL at 17:31

## 2019-05-08 RX ADMIN — AMLODIPINE BESYLATE 10 MG: 5 TABLET ORAL at 09:18

## 2019-05-08 RX ADMIN — HEPARIN SODIUM 5000 UNITS: 5000 INJECTION INTRAVENOUS; SUBCUTANEOUS at 05:39

## 2019-05-08 RX ADMIN — HEPARIN SODIUM 5000 UNITS: 5000 INJECTION INTRAVENOUS; SUBCUTANEOUS at 21:31

## 2019-05-08 RX ADMIN — Medication 10 ML: at 05:40

## 2019-05-08 RX ADMIN — CITALOPRAM HYDROBROMIDE 10 MG: 20 TABLET ORAL at 17:31

## 2019-05-08 RX ADMIN — CARBIDOPA AND LEVODOPA 1 TABLET: 25; 100 TABLET ORAL at 09:17

## 2019-05-08 RX ADMIN — SODIUM CHLORIDE 75 ML/HR: 900 INJECTION, SOLUTION INTRAVENOUS at 23:48

## 2019-05-08 RX ADMIN — ASPIRIN AND DIPYRIDAMOLE 1 CAPSULE: 25; 200 CAPSULE, EXTENDED RELEASE ORAL at 09:17

## 2019-05-08 NOTE — PROGRESS NOTES
Problem: Self Care Deficits Care Plan (Adult) Goal: *Acute Goals and Plan of Care (Insert Text) Description Occupational Therapy Goals: 
Initiated 5/6/2019 1. Patient will perform grooming standing with supervision/set-up within 7 days. 2. Patient will perform toileting with supervision/set-up within 7 days. 3. Patient will perform lower body dressing with minimal assistance within 7 days. 4. Patient perform bathing standing with supervision/set up within 7 days. 5. Patient will transfer from toilet with supervision using the least restrictive device and appropriate durable medical equipment within 7 days. Outcome: Progressing Towards Goal 
 OCCUPATIONAL THERAPY TREATMENT Patient: Mesha Wise (56 y.o. male) Date: 5/8/2019 Diagnosis: TIA (transient ischemic attack) [G45.9] Stroke (cerebrum) ([facility]) [I63.9] <principal problem not specified> Precautions: Bed Alarm Chart, occupational therapy assessment, plan of care, and goals were reviewed. ASSESSMENT: 
Pt continues to make steady progress towards goals. Pt can tolerate 3 hours of aggressive inpatient rehab. Focus on motor planning, gross motor coordination and dynamic/static standing balance during ADLS. Max assist to moderate assist for mobility with RW. Assist with managing walker and heavy right lean at times. Unable to perform dynamic standing ADLS UB dressing, grooming or toileting without max assist due to balance deficits. Continue to recommend inpatient rehab at discharge. Progression toward goals: 
?       Improving appropriately and progressing toward goals ? Improving slowly and progressing toward goals ? Not making progress toward goals and plan of care will be adjusted PLAN: 
Patient continues to benefit from skilled intervention to address the above impairments. Continue treatment per established plan of care. Discharge Recommendations:  Inpatient Rehab Further Equipment Recommendations for Discharge:  defer to rehab SUBJECTIVE:  
Patient stated ? I don't need to rest. I  can do it. ? OBJECTIVE DATA SUMMARY:  
Cognitive/Behavioral Status: 
Neurologic State: Alert;Confused Orientation Level: Oriented to person;Oriented to place Cognition: Follows commands Perception: (cues to fully scan to the left to compensate for heminopsia) Perseveration: No perseveration noted Safety/Judgement: Fall prevention Functional Mobility and Transfers for ADLs: 
Bed Mobility: 
Scooting: Contact guard assistance Transfers: 
Sit to Stand: Minimum assistance; Additional time(CGA to initiate and min. for transfer of weight/UE's from seated surface to RW) Functional Transfers Bathroom Mobility: Moderate assistance(with RW) Toilet Transfer : Moderate assistance(left grab bar) Balance: 
Sitting: Intact Sitting - Static: Good (unsupported) Sitting - Dynamic: Not tested Standing: Impaired; With support Standing - Static: Constant support; Fair 
Standing - Dynamic : Constant support;Fair(with RW, poor with SPC ) ADL Intervention: 
  
 
Grooming Washing Hands: Maximum assistance(balance standing, on hand off surface at time and w/ B off ) Upper Body Dressing Assistance [facility] Gown: Moderate assistance(as robe in standing) Toileting Bladder Hygiene: Supervision(seated on commode) Clothing Management: Total assistance (dependent)(due to heavy posterior lean and right lateral lean) Cues: Physical assistance for pants down;Physical assistance for pants up; Tactile cues provided;Verbal cues provided;Visual cues provided Cognitive Retraining Problem Solving: Identifying the problem;General alternative solution Safety/Judgement: Fall prevention Neuro Re-Education: 
Manual facilitation for midline standing upright and cues for full tracking to left, manual cues for pt to align self with surfaces as pt tends to stand sideways Pain: Pain Scale 1: Numeric (0 - 10) Pain Intensity 1: 0 Activity Tolerance:  
Please refer to the flowsheet for vital signs taken during this treatment. After treatment:  
? Patient left in no apparent distress sitting up in chair ? Patient left in no apparent distress in bed 
? Call bell left within reach ? Nursing notified ? Caregiver present ? Bed alarm activated COMMUNICATION/COLLABORATION:  
The patient?s plan of care was discussed with: Physical Therapist, Registered Nurse and patient Yessica Khoury OTR/SILVIA Time Calculation: 39 mins

## 2019-05-08 NOTE — PROGRESS NOTES
Consult REFERRED BY: 
Tiffany Lyle NP 
 
CHIEF COMPLAINT: Sudden blurred vision and bilateral leg weakness Subjective:  
 
Adeel Bradford is a 69 y.o. right-handed  male we are seeing as a new patient, for evaluation at the request of Dr. Danitza Lance of a new problem of sudden blurred vision and difficulty walking with bilateral leg weakness and slower speech we try to get out of bed yesterday morning. This apparently lasted several hours, the patient feels that he is back to his normal baseline. His CT scan on admission just shows chronic infarctions, no acute findings. 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 Discussed with the patient hospice and I think we should get a CTA to define all of these lesions completely and to see whether or not he needs interventional neuroradiology for his aneurysm. We will hydrate him today and get CTA in the morning. He had a previous large right middle cerebral artery infarct from a stroke that occurred about 8 to 10 years ago and has had a residual left-sided weakness ever since. He also has a small area of ischemia in the left cerebellar hemisphere. He has diffuse white matter disease scattered throughout the brain. Patient also has history of Parkinson's disease is currently on Sinemet 50/200 mg once a day in the morning. Patient was currently on Aggrenox once a day and a baby aspirin once a day. He had not had any recurrent strokes since his last one 8 to 10 years ago. He is never seen a neurologist for his Parkinson's disease. He also appears to have a mild dementia because he is currently on Aricept 10 mg a day.   He denies any unusual chest pain, palpitations, or other cardiac symptoms associated with a stroke. He said he is feeling much better now and his legs feel back to normal. 
He denies any major increase in back pain, neck pain, or bowel or bladder difficulty associated with this episode. He denies any unusual headache, double vision, falls, fever, meningismus, new medications, or cardiac symptoms as above. Past Medical History:  
Diagnosis Date  CAD (coronary artery disease)  Hypertension  Other ill-defined conditions(799.89)   
 parkinson's  Other ill-defined conditions(799.89)   
 high cholesterol  Stroke [facility]) 2006  
 left sided weakness Past Surgical History:  
Procedure Laterality Date  HX CATARACT REMOVAL Bilateral   
 HX HEART CATHETERIZATION  2006  [place] COLONOSCOPY FLX DX W/COLLJ SPEC WHEN PFRMD  5/12/2014 History reviewed. No pertinent family history. Social History Tobacco Use  Smoking status: Former Smoker  Smokeless tobacco: Never Used Substance Use Topics  Alcohol use: No  
   
 
Current Facility-Administered Medications:  
  0.9% sodium chloride infusion, 75 mL/hr, IntraVENous, CONTINUOUS, Wendi Newell MD, Last Rate: 75 mL/hr at 05/07/19 2138, 75 mL/hr at 05/07/19 2138   sodium chloride (NS) flush 5-40 mL, 5-40 mL, IntraVENous, Q8H, Murphy Villalta, DO, 10 mL at 05/07/19 2134   sodium chloride (NS) flush 5-40 mL, 5-40 mL, IntraVENous, PRN, Murphy Villalta, DO 
  acetaminophen (TYLENOL) tablet 650 mg, 650 mg, Oral, Q4H PRN **OR** acetaminophen (TYLENOL) solution 650 mg, 650 mg, Per NG tube, Q4H PRN **OR** acetaminophen (TYLENOL) suppository 650 mg, 650 mg, Rectal, Q4H PRN, Murphy Villalta, DO 
  heparin (porcine) injection 5,000 Units, 5,000 Units, SubCUTAneous, Q8H, Radha Payment, DO, 5,000 Units at 05/07/19 2133   citalopram (CELEXA) tablet 10 mg, 10 mg, Oral, QPM, Murphy Villalta, DO, 10 mg at 05/07/19 1708   polyethylene glycol (MIRALAX) packet 17 g, 17 g, Oral, DAILY, Murphy Villalta DO, 17 g at 05/06/19 1005   amLODIPine (NORVASC) tablet 10 mg, 10 mg, Oral, DAILY, Murphy Villalta DO, 10 mg at 05/07/19 0926 
  ondansetron (ZOFRAN) injection 4 mg, 4 mg, IntraVENous, Q6H PRN, Robert Hernandez MD 
  pravastatin (PRAVACHOL) tablet 80 mg, 80 mg, Oral, DAILY, Robert Hernandez MD, 80 mg at 05/07/19 0926 
  aspirin-dipyridamole (AGGRENOX)  mg per capsule 1 Cap, 1 Cap, Oral, BIDPC, Juliano Zazueta MD, 1 Cap at 05/07/19 3107   carbidopa-levodopa (SINEMET)  mg per tablet 1 Tab, 1 Tab, Oral, TID, Juliano Zazueta MD, 1 Tab at 05/07/19 2133   famotidine (PEPCID) tablet 20 mg, 20 mg, Oral, DAILY, Robert Hernandez MD, 20 mg at 05/07/19 0926 
  metoprolol succinate (TOPROL-XL) XL tablet 25 mg, 25 mg, Oral, DAILY, Robert Hernandez MD, 25 mg at 05/07/19 9519 No Known Allergies MRI Results (most recent): 
Results from Hospital Encounter encounter on 05/05/19 MRA BRAIN WO CONT Narrative INDICATION:   CVA COMPARISON:  None TECHNIQUE:   
3-D time-of-flight MRA of the brain was performed. Multiplanar reconstructions 
were obtained. FINDINGS: 
There is no flow demonstrated within the distal right vertebral artery and 
minimal to no flow within the distal left vertebral artery. No flow is 
demonstrated within the basilar arteries. . While the internal carotid arteries 
are patent. There is fusiform enlargement of the left cavernous carotid 
measuring 6 mm in diameter. There is an 11 mm slightly irregular left posterior 
communicating artery aneurysm. There is irregular narrowing of the left middle 
cerebral artery with fusiform enlargement at its trifurcation there is narrowing 
of the right cavernous carotid with fusiform enlargement of the right carotid 
terminus measuring a maximum of 8 mm. There is irregular narrowing of the right middle cerebral artery. Anterior cerebral arteries are patent. There are 
bilateral posterior communicating arteries Impression IMPRESSION:   
1. No flow is demonstrated within the right vertebral artery and local stenosis 
flow is noted within the left vertebral artery. No flow is demonstrated within 
the basilar artery. 2. 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 Fusiform dilatation of the left cavernous carotid 4. Marked narrowing of the right cavernous carotid with fusiform dilatation of 
the distal right internal carotid artery 5. Multifocal areas of stenosis in the middle cerebral arteries bilaterally Results from Hospital Encounter encounter on 05/05/19 MRA BRAIN WO CONT Narrative INDICATION:   CVA COMPARISON:  None TECHNIQUE:   
3-D time-of-flight MRA of the brain was performed. Multiplanar reconstructions 
were obtained. FINDINGS: 
There is no flow demonstrated within the distal right vertebral artery and 
minimal to no flow within the distal left vertebral artery. No flow is 
demonstrated within the basilar arteries. . While the internal carotid arteries 
are patent. There is fusiform enlargement of the left cavernous carotid 
measuring 6 mm in diameter. There is an 11 mm slightly irregular left posterior 
communicating artery aneurysm. There is irregular narrowing of the left middle 
cerebral artery with fusiform enlargement at its trifurcation there is narrowing 
of the right cavernous carotid with fusiform enlargement of the right carotid 
terminus measuring a maximum of 8 mm. There is irregular narrowing of the right 
middle cerebral artery. Anterior cerebral arteries are patent. There are 
bilateral posterior communicating arteries Impression IMPRESSION:   
1. No flow is demonstrated within the right vertebral artery and local stenosis 
flow is noted within the left vertebral artery. No flow is demonstrated within 
the basilar artery. 2. 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 Fusiform dilatation of the left cavernous carotid 4. Marked narrowing of the right cavernous carotid with fusiform dilatation of 
the distal right internal carotid artery 5. Multifocal areas of stenosis in the middle cerebral arteries bilaterally Review of Systems: A comprehensive review of systems was negative except for: Constitutional: positive for fatigue and malaise Eyes: positive for visual disturbance Musculoskeletal: positive for myalgias, arthralgias and stiff joints Neurological: positive for memory problems, coordination problems and gait problems Behvioral/Psych: positive for Memory loss Vitals:  
 05/07/19 1011 05/07/19 1250 05/07/19 1610 05/07/19 1947 BP:  141/83 137/72 140/82 Pulse:  (!) 108 75 69 Resp:  22 20 18 Temp:  98.1 °F (36.7 °C) 98.3 °F (36.8 °C) 97.7 °F (36.5 °C) SpO2:  97% 100% 97% Weight: 176 lb 12 oz (80.2 kg) Height: 6' (1.829 m) Objective: I 
 
 
NEUROLOGICAL EXAM: 
 
Appearance: The patient is well developed, well nourished, provides a fair history and is in no acute distress. Mental Status: Oriented to time, place and person, and the president, cognitive function is low and probably mildly abnormal and speech is fluent and no aphasia or dysarthria. Mood and affect appropriate. Cranial Nerves:   LHH on visual fields. Fundi are benign, disc are flat, no lesions seen on funduscopy. NILE, EOM's full, no nystagmus, no ptosis. Facial sensation is normal. Corneal reflexes are not tested. Facial movement is asymmetric on the left. Hearing is abnormal bilaterally. Palate is midline with normal sternocleidomastoid and trapezius muscles are normal. Tongue is midline. Neck without meningismus or bruits Temporal arteries are not tender or enlarged TMJ areas are not tender on palpation Motor:  4/5 strength in upper and lower proximal and distal muscles, but patient has a mild left-sided weakness and slight arm drift. Normal bulk and increased tone bilaterally, right side greater than left with cogwheel rigidity more on the right side as compared to the left. . No fasciculations. Rapid alternating movement is normal on the right, but decreased on the left. Reflexes:   Deep tendon reflexes 2+/4 on the left, and 1+/4 on the right No babinski or clonus present Sensory:   Normal to touch, pinprick and vibration and temperature decreased in both feet. DSS is intact Gait:  Not tested gait since patient has restraints on. Tremor:   No tremor noted. Cerebellar:  No abnormal cerebellar signs present on finger-nose-finger exam.  Romberg and tandem could not be tested Neurovascular:  Normal heart sounds and regular rhythm, peripheral pulses decreased, and no carotid bruits. Assessment: ICD-10-CM ICD-9-CM 1. Acute ischemic stroke (HCC) I63.9 434.91   
2. Bilateral carotid artery stenosis I65.23 433.10   
  433.30 3. Late onset Alzheimer's disease without behavioral disturbance G30.1 331.0 F02.80 294.10 4. Left homonymous hemianopsia due to recent cerebral infarction I69.398 438.7 H53.462 368.46   
5. Parkinson's disease (tremor, stiffness, slow motion, unstable posture) ([address] G20 332.0 6. Thrombotic stroke involving right middle cerebral artery ([facility] [address] I63.311 434.01 Active Problems: 
  TIA (transient ischemic attack) (5/5/2019) Thrombotic stroke involving right middle cerebral artery ([address] (5/6/2019) Bilateral carotid artery stenosis (5/6/2019) Left homonymous hemianopsia due to recent cerebral infarction (5/6/2019) Parkinson's disease (tremor, stiffness, slow motion, unstable posture) ([address] (5/6/2019) Late onset Alzheimer's disease without behavioral disturbance (5/6/2019) Stroke (cerebrum) ([address] (5/6/2019) Plan:  
 
Patient with new left cerebellar infarct, and bilateral vertebral artery disease and basilar artery disease and possible aneurysm of the posterior cerebral artery, so we will get CTA to define all these lesions and then see whether or not he needs intervention Patient appears to have a mild left hemiparesis and left homonymous hemianopsia, and the question is this is new or old, and he needs an MRI scan. I reviewed his MRI and MRA personally on the PACS system myself and I agree with reports as above his need for CTA. Have discussed with the hospitalist we will hydrate him today and then do CTA We will increase his Aggrenox to twice a day, and continue his Sinemet but increase the dose to 3 times a day of the lower version. Difficult case, discussed with the patient and the family in detail, need to make sure there is no cardioembolic source, or other neurologic cause for his symptoms. Patient obviously a high risk for further neurological decompensation or worsening severe morbidity and possibly mortality if his disease of Alzheimer's disease, vascular disease is prominent, although untreated in addition to his Parkinson's disease Continue excellent medical care as you are, we will follow closely with you. Signed By: Radha Rojas MD   
 May 7, 2019 CC: Murali Hernandez NP 
FAX: [phone]

## 2019-05-08 NOTE — PROGRESS NOTES
Second medicare im letter discussed with patient and his friend. Signed copy placed on chart. Copy left in patient's room.

## 2019-05-08 NOTE — PROGRESS NOTES
Hospitalist Progress Note NAME: Casper Zambrano :  1940 MRN:  [id number] Assessment / Plan: 
Acute large Left cerebellar CVA Right vertebral/basilar occlusion POA Prior large R MCA CVA with residual L sided weakness 
prior L cerebellar CVA POA Parkinson's disease with dementia POA Left Posterior communicating artery aneurysm POA 
+blurred vision, difficulty walking with bilateral leg weakness and slower speech CT head with chronic infarctions. No acute findings. MRI brain with large acute infarct of the inferior left cerebellum MRA brain with no flow demonstrated within the right vertebral artery and local stenosis flow is noted within the left vertebral artery. No flow is demonstrated within 
the basilar artery. 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 Fusiform dilatation of the left cavernous carotid. Marked narrowing of the right cavernous carotid with fusiform dilatation of the distal right internal carotid artery. Multifocal areas of stenosis in the middle cerebral arteries bilaterally. Check echo ,  Con't pravachol, dose increased. Change aggrenox to BID per neuro recs Neuro consult appreciated, sinemet dose adjusted per their recommendations Spoke with Dr Corona Ran Check CTA head and neck in AM 
IVF overnight, recheck creatinine PT/OT recommending SA[facility] Essential HTN and CAD: normotensive Norvasc Metoprolol at decreased dose Hyperglycemia: no h/o DM HgA1c 5.9 Presumed CKD stage 3 POA Dementia with anxiety, depression: con't celexa 
  
Code Status: Full Surrogate Decision Maker: wife  
 
DVT Prophylaxis: heparin 
  
Baseline: ambulates with cane, lives at home with wife and duaghter Subjective: Chief Complaint / Reason for Physician Visit \"Feeling better today\". Improving, still weak beyond baseline. Discussed with RN events overnight. Review of Systems: 
Symptom Y/N Comments  Symptom Y/N Comments Fever/Chills n   Chest Pain n   
Poor Appetite    Edema n   
Cough n   Abdominal Pain n   
Sputum    Joint Pain SOB/MARTINEZ n   Pruritis/Rash Nausea/vomit n   Tolerating PT/OT Diarrhea    Tolerating Diet y Constipation    Other Could NOT obtain due to:   
 
Objective: VITALS:  
Last 24hrs VS reviewed since prior progress note. Most recent are: 
Patient Vitals for the past 24 hrs: 
 Temp Pulse Resp BP SpO2  
05/07/19 1947 97.7 °F (36.5 °C) 69 18 140/82 97 % 05/07/19 1610 98.3 °F (36.8 °C) 75 20 137/72 100 % 05/07/19 1250 98.1 °F (36.7 °C) (!) 108 22 141/83 97 % 05/07/19 0740 98 °F (36.7 °C) 61 16 158/85 98 % 05/07/19 0309 98.5 °F (36.9 °C) 68 18 141/86 99 % 05/06/19 2259 97.3 °F (36.3 °C) 66 18 176/74 99 % 05/06/19 2053  65 18 166/88 98 % Intake/Output Summary (Last 24 hours) at 5/7/2019 2036 Last data filed at 5/7/2019 0281 Gross per 24 hour Intake  Output 250 ml Net -250 ml PHYSICAL EXAM: 
General: WD, WN. Alert, cooperative, no acute distress   
EENT:  EOMI. Anicteric sclerae. MMM Resp:  CTA bilaterally, no wheezing or rales. No accessory muscle use CV:  Regular  rhythm,  No edema GI:  Soft, Non distended, Non tender.  +Bowel sounds Neurologic:  Alert and oriented X 3 when I saw normal speech, LLE weakness Psych:   Some insight. Not anxious nor agitated Skin:  No rashes. No jaundice Reviewed most current lab test results and cultures  YES Reviewed most current radiology test results   YES Review and summation of old records today    NO Reviewed patient's current orders and MAR    YES 
PMH/SH reviewed - no change compared to H&P 
________________________________________________________________________ Care Plan discussed with: 
  Comments Patient x Family  x Family at bedside RN x Care Manager x Consultant                   Multidiciplinary team rounds were held today with case manager, nursing, pharmacist and clinical coordinator. Patient's plan of care was discussed; medications were reviewed and discharge planning was addressed. ________________________________________________________________________ Total NON critical care TIME:  35 Minutes Total CRITICAL CARE TIME Spent:   Minutes non procedure based Comments >50% of visit spent in counseling and coordination of care x   
________________________________________________________________________ Arturo Eden MD  
 
Procedures: see electronic medical records for all procedures/Xrays and details which were not copied into this note but were reviewed prior to creation of Plan. LABS: 
I reviewed today's most current labs and imaging studies. Pertinent labs include: 
Recent Labs 05/05/19 
1540 WBC 5.8 HGB 13.9 HCT 41.5  Recent Labs 05/06/19 
1047 05/05/19 
1540 NA  --  140 K  --  4.2 CL  --  109* CO2  --  25  
GLU  --  177* BUN  --  21* CREA  --  1.79* CA  --  9.2 MG 2.2  --   
INR  --  1.1 Signed: Arturo Eden MD

## 2019-05-08 NOTE — PROGRESS NOTES
Bedside shift change report given to Ada RN (oncoming nurse) by Yesi Washington RN (offgoing nurse). Report included the following information SBAR, Kardex, Intake/Output, MAR and Recent Results. Zone Phone:   [phone] Significant changes during shift:  None Plan for upcoming shift: Safety; Discharge Plan: Yes Inpatient Rehab; waiting on placement. Active Consults: 
IP CONSULT TO HOSPITALIST 
IP CONSULT TO NEUROLOGY Patient Information Filemon Patricia 69 y.o. 
5/5/2019  3:15 PM by Hebert Aguirre MD. Filemon Patricia was admitted from Home 
 
Problem List 
 
Patient Active Problem List  
 Diagnosis Date Noted  Thrombotic stroke involving right middle cerebral artery ([facility] [address] 05/06/2019  Bilateral carotid artery stenosis 05/06/2019  Left homonymous hemianopsia due to recent cerebral infarction 05/06/2019  Parkinson's disease (tremor, stiffness, slow motion, unstable posture) ([facility] [address] 05/06/2019  Late onset Alzheimer's disease without behavioral disturbance 05/06/2019  Stroke (cerebrum) ([facility] [address] 05/06/2019  TIA (transient ischemic attack) 05/05/2019 Past Medical History:  
Diagnosis Date  CAD (coronary artery disease)  Hypertension  Other ill-defined conditions(799.89)   
 parkinson's  Other ill-defined conditions(799.89)   
 high cholesterol  Stroke [facility]) 2006  
 left sided weakness * No surgery found * Core Measures: CVA: Yes CHF:No  
PNA:No  
 
Activity Status: OOB to Chair Yes Ambulated this shift Yes Bed Rest No 
 
Supplemental O2: (If Applicable) NC No 
On 0 Liters/min LINES AND DRAINS: 
 
Central Line? No  
 
PICC LINE? No  
 
Urinary Catheter? No  
DVT prophylaxis: DVT prophylaxis Med- Yes DVT prophylaxis SCD or CARLOS- No  
 
Wounds: (If Applicable) Wounds- No 
 
Location 0 Patient Safety: 
 
Falls Score Total Score: 5 Safety Level_______ Bed Alarm On? Yes Sitter?  No

## 2019-05-08 NOTE — PROGRESS NOTES
Patient is unable to sign medicare letter due to medicare condition made care management aware. Safia Freedman [phone]

## 2019-05-08 NOTE — PROGRESS NOTES
[place] Plan--- [facility]. Patient has been accepted at [address] [place] [address] when medically stable. FOC signed and placed on chart.

## 2019-05-08 NOTE — PROGRESS NOTES
Bedside shift change report given to Anthony Porras RN (oncoming nurse) by Berna Teixeira RN (offgoing nurse). Report included the following information SBAR, Kardex, Intake/Output, MAR and Recent Results. Zone Phone:   [phone] Significant changes during shift:  Patient had echo today; pending placement. Plan for upcoming shift: Safety; Discharge Plan: Yes Inpatient Rehab; waiting on placement. Active Consults: 
IP CONSULT TO HOSPITALIST 
IP CONSULT TO NEUROLOGY Patient Information Romelia Olvera 69 y.o. 
5/5/2019  3:15 PM by Vipin Guzmán MD. Romelia Olvera was admitted from Home 
 
Problem List 
 
Patient Active Problem List  
 Diagnosis Date Noted  Thrombotic stroke involving right middle cerebral artery ([facility] [address] 05/06/2019  Bilateral carotid artery stenosis 05/06/2019  Left homonymous hemianopsia due to recent cerebral infarction 05/06/2019  Parkinson's disease (tremor, stiffness, slow motion, unstable posture) ([facility] [address] 05/06/2019  Late onset Alzheimer's disease without behavioral disturbance 05/06/2019  Stroke (cerebrum) ([facility] [address] 05/06/2019  TIA (transient ischemic attack) 05/05/2019 Past Medical History:  
Diagnosis Date  CAD (coronary artery disease)  Hypertension  Other ill-defined conditions(799.89)   
 parkinson's  Other ill-defined conditions(799.89)   
 high cholesterol  Stroke [facility]) 2006  
 left sided weakness * No surgery found * Core Measures: CVA: Yes CHF:No  
PNA:No  
 
Activity Status: OOB to Chair Yes Ambulated this shift Yes Bed Rest No 
 
Supplemental O2: (If Applicable) NC No 
On 0 Liters/min LINES AND DRAINS: 
 
Central Line? No  
 
PICC LINE? No  
 
Urinary Catheter? No  
DVT prophylaxis: DVT prophylaxis Med- Yes DVT prophylaxis SCD or CARLOS- No  
 
Wounds: (If Applicable) Wounds- No 
 
Location 0 Patient Safety: 
 
Falls Score Total Score: 5 Safety Level_______ Bed Alarm On? Yes Sitter?  No

## 2019-05-08 NOTE — PROGRESS NOTES
Call received from CT;  CTA unable to be completed until tomorrow, due to concerns from radiologist; will be on hold until tomorrow, when Dr. Tami Daniels and Dr Sanches Prudent will review it.

## 2019-05-08 NOTE — PROGRESS NOTES
Problem: Mobility Impaired (Adult and Pediatric) Goal: *Acute Goals and Plan of Care (Insert Text) Description Physical Therapy Goals Initiated 5/6/2019 1. Patient will move from supine to sit and sit to supine , scoot up and down and roll side to side in bed with modified independence within 7 day(s). 2.  Patient will transfer from bed to chair and chair to bed with minimal assistance/contact guard assist using the least restrictive device within 7 day(s). 3.  Patient will perform sit to stand with minimal assistance/contact guard assist within 7 day(s). 4.  Patient will ambulate with minimal assistance/contact guard assist for 100 feet with the least restrictive device within 7 day(s). 5.  Patient will ascend/descend 3 stairs with 2 handrail(s) with minimal assistance/contact guard assist within 7 day(s). 6.  Patient will improve Mike Balance score by 7 points within 7 days. Outcome: Progressing Towards Goal 
  
PHYSICAL THERAPY TREATMENT: Neuro Patient: Alison Clements (16 y.o. male) Date: 5/8/2019 Diagnosis: TIA (transient ischemic attack) [G45.9] Stroke (cerebrum) (HCC) [I63.9] <principal problem not specified> Precautions: Bed Alarm Chart, physical therapy assessment, plan of care and goals were reviewed. ASSESSMENT: 
Based on the objective data described below, the patient presents with min. assistance level overall for transfers. Gait training completed at Mod./Maximum assistance, 45ft x 2 with seated rest between trials and using a rolling walker. Pt cooperative/agreeable and responding well with follow-through with cuing provided during functional mobility efforts. Increased fatigue noted on 2nd trial of gait with pt requiring cuing for rest break as verbal feedback provided via pt not representative of physical signs of fatigue.   PT management of RW required to assist with fluidity of gait pattern and consistent step-through pattern with pt intermittently reverting back to step-to. Consistent cuing for B knee extension during standing and gait, pt also with posterior lean, worse with fatigue. Pt agreeable to RW instead of SPC once education provided. The following are barriers to independence while in acute care:  
-Cognitive and/or behavioral: sequencing, safety awareness and insight into deficits 
-Medical condition: standing balance, motor planning and coordination   
-Other:    
 
Prior level of function: S/mod. I with SPC PLAN: 
Patient continues to benefit from skilled intervention to address the above impairments. Continue treatment per established plan of care. Recommend with staff: bed to chair/BSC Recommend next PT session: continued balance and neuro. Re-education training Discharge recommendations: Rehab at inpatient facility: patient can tolerate 3 hours of therapy (to regain functional baseline patient requires rehab) If above is not an option then recommend: Rehab at skilled nursing facility (SNF) (to regain functional baseline patient requires rehab) Patient's barriers to discharging home, in addition to above impairments: family availability to assist 
level of physical assist required to maintain patient safety. Equipment recommendations for successful discharge (if) home: tbd via facility (pt unsafe to discharge home at this time) SUBJECTIVE:  
Patient stated ? Okay, I'm ready. ? OBJECTIVE DATA SUMMARY:  
Critical Behavior: 
Neurologic State: Alert, Confused Orientation Level: Oriented to person, Oriented to time, Disoriented to situation, Disoriented to place Cognition: Follows commands Safety/Judgement: Fall prevention, Decreased awareness of need for safety, Decreased insight into deficits Functional Mobility Training and Neuro Re-education: 
Bed Mobility: 
  
 DNT, pt sitting up in bedside chair Transfers: 
Sit to Stand: Minimum assistance; Additional time(CGA to initiate and min. for transfer of weight/UE's from seated surface to RW) Stand to Sit: Minimum assistance; Additional time Interventions: Tactile cues; Safety awareness training;Verbal cues; Weight shifting training/Pressure relief Balance: 
Sitting: Intact Sitting - Static: Good (unsupported) Sitting - Dynamic: Not tested Standing: Impaired; With support Standing - Static: Constant support; Fair 
Standing - Dynamic : Constant support;Fair(with RW, poor with SPC ) Neuro. Re-education: 
Pre-gait training/balance:  BLE stepping forward and back to starting position focusing on motor control/planning with cuing for consistent foot placement ; weight-shifting laterally right and left with cuing for knee extension, posterior lean noted Ambulation/Gait Training: 
Distance (ft): 90 Feet (ft)(45ft x 2 (chair follow for safety, seated rest after 45ft)) Assistive Device: Walker, rolling;Gait belt Ambulation - Level of Assistance: Moderate assistance;Maximum assistance; Additional time Gait Abnormalities: Decreased step clearance; Path deviations; Ataxic(fluctuating step-to to short step-through pattern with cuing ) Base of Support: Shift to right;Narrowed Speed/Marcela: Pace decreased (<100 feet/min); Shuffled Step Length: Left shortened;Right shortened Swing Pattern: Right asymmetrical;Left asymmetrical 
  
Interventions: Safety awareness training; Tactile cues; Verbal cues; Visual/Demos Pain: 
Pre treatment: 0 /10 During treatment: 0/10 Post treatment:  0/10 Activity Tolerance:  
Fair and requires rest breaks Please refer to the flowsheet for vital signs taken during this treatment. After treatment patient left:  
Up in chair Bed alarm/tab alert on Bed/Chair-wheels locked Call light within reach RN notified COMMUNICATION/COLLABORATION:  
The patient?s plan of care was discussed with: Occupational Therapist and Registered Nurse Patient was educated regarding His deficit(s) of balance as this relates to His diagnosis of TIA/CVA. He demonstrated Fair understanding as evidenced by verbalization. Patient and/or family was verbally educated on the BE FAST acronym for signs/symptoms of CVA and TIA. BE FAST was written on patient's communication board  for visual education and reinforcement. All questions answered with patient indicating fair understanding. Eulalia Feldman, PT, DPT Time Calculation: 38 mins

## 2019-05-08 NOTE — PROGRESS NOTES
Consult REFERRED BY: 
Alma Root NP 
 
CHIEF COMPLAINT: Sudden blurred vision and bilateral leg weakness Subjective:  
 
Filemon Patricia is a 69 y.o. right-handed  male we are seeing as a new patient, for evaluation at the request of Dr. Axel Tsai of a new problem of sudden blurred vision and difficulty walking with bilateral leg weakness and slower speech we try to get out of bed yesterday morning. This apparently lasted several hours. His CT scan on admission just shows chronic infarctions, no acute findings. 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 Discussed with the patient hospice and I think we should get a CTA to define all of these lesions completely and to see whether or not he needs interventional neuroradiology for his aneurysm. We will hydrate him today and get CTA in the morning. If he really has a 12 mm cerebral aneurysm, coiling the aneurysm is normally recommended if the aneurysm is bigger than 8 mm, but he is a difficult case. 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 He had a previous large right middle cerebral artery infarct from a stroke that occurred about 8 to 10 years ago and has had a residual left-sided weakness ever since. He also has a small area of ischemia in the left cerebellar hemisphere. He has diffuse white matter disease scattered throughout the brain. Patient also has history of Parkinson's disease is currently on Sinemet 50/200 mg once a day in the morning.   Patient was currently on Aggrenox once a day and a baby aspirin once a day. He had not had any recurrent strokes since his last one 8 to 10 years ago. He is never seen a neurologist for his Parkinson's disease. He also appears to have a mild dementia because he is currently on Aricept 10 mg a day. He denies any unusual chest pain, palpitations, or other cardiac symptoms associated with a stroke. He said he is feeling much better now and his legs feel back to normal. 
He denies any major increase in back pain, neck pain, or bowel or bladder difficulty associated with this episode. He denies any unusual headache, double vision, falls, fever, meningismus, new medications, or cardiac symptoms as above. Past Medical History:  
Diagnosis Date  CAD (coronary artery disease)  Hypertension  Other ill-defined conditions(799.89)   
 parkinson's  Other ill-defined conditions(799.89)   
 high cholesterol  Stroke [facility]) 2006  
 left sided weakness Past Surgical History:  
Procedure Laterality Date  HX CATARACT REMOVAL Bilateral   
 HX HEART CATHETERIZATION  2006  [place] COLONOSCOPY FLX DX W/COLLJ SPEC WHEN PFRMD  5/12/2014 History reviewed. No pertinent family history. Social History Tobacco Use  Smoking status: Former Smoker  Smokeless tobacco: Never Used Substance Use Topics  Alcohol use: No  
   
 
Current Facility-Administered Medications:  
  0.9% sodium chloride infusion, 75 mL/hr, IntraVENous, CONTINUOUS, Teena Newell MD, Last Rate: 75 mL/hr at 05/07/19 2138, 75 mL/hr at 05/07/19 2138   sodium chloride (NS) flush 5-40 mL, 5-40 mL, IntraVENous, Q8H, Murphy Villalta DO, 10 mL at 05/08/19 0540 
  sodium chloride (NS) flush 5-40 mL, 5-40 mL, IntraVENous, PRN, Murphy Villalta, DO 
  acetaminophen (TYLENOL) tablet 650 mg, 650 mg, Oral, Q4H PRN **OR** acetaminophen (TYLENOL) solution 650 mg, 650 mg, Per NG tube, Q4H PRN **OR** acetaminophen (TYLENOL) suppository 650 mg, 650 mg, Rectal, Q4H PRN, Murphy Villalta, DO 
  heparin (porcine) injection 5,000 Units, 5,000 Units, SubCUTAneous, Q8H, Tyrone Villaltay, DO, 5,000 Units at 05/08/19 1205   citalopram (CELEXA) tablet 10 mg, 10 mg, Oral, QPM, Murphy Villalta, DO, 10 mg at 05/08/19 1731   polyethylene glycol (MIRALAX) packet 17 g, 17 g, Oral, DAILY, ClevelandMurphy, DO, 17 g at 05/08/19 0917 
  amLODIPine (NORVASC) tablet 10 mg, 10 mg, Oral, DAILY, ClevelandMurphy, DO, 10 mg at 05/08/19 8788   ondansetron (ZOFRAN) injection 4 mg, 4 mg, IntraVENous, Q6H PRN, Frandy Giordano MD 
  pravastatin (PRAVACHOL) tablet 80 mg, 80 mg, Oral, DAILY, Frandy Giordano MD, 80 mg at 05/08/19 3121   aspirin-dipyridamole (AGGRENOX)  mg per capsule 1 Cap, 1 Cap, Oral, BIDPC, Vivek Horton MD, 1 Cap at 05/08/19 1731   carbidopa-levodopa (SINEMET)  mg per tablet 1 Tab, 1 Tab, Oral, TID, Vivek Horton MD, 1 Tab at 05/08/19 1731   famotidine (PEPCID) tablet 20 mg, 20 mg, Oral, DAILY, Frandy Giordano MD, 20 mg at 05/08/19 1177 
  metoprolol succinate (TOPROL-XL) XL tablet 25 mg, 25 mg, Oral, DAILY, Frandy Giordano MD, 25 mg at 05/08/19 5439 No Known Allergies MRI Results (most recent): 
Results from Hospital Encounter encounter on 05/05/19 MRA BRAIN WO CONT Narrative INDICATION:   CVA COMPARISON:  None TECHNIQUE:   
3-D time-of-flight MRA of the brain was performed. Multiplanar reconstructions 
were obtained. FINDINGS: 
There is no flow demonstrated within the distal right vertebral artery and 
minimal to no flow within the distal left vertebral artery. No flow is 
demonstrated within the basilar arteries. . While the internal carotid arteries 
are patent. There is fusiform enlargement of the left cavernous carotid 
measuring 6 mm in diameter. There is an 11 mm slightly irregular left posterior communicating artery aneurysm. There is irregular narrowing of the left middle 
cerebral artery with fusiform enlargement at its trifurcation there is narrowing 
of the right cavernous carotid with fusiform enlargement of the right carotid 
terminus measuring a maximum of 8 mm. There is irregular narrowing of the right 
middle cerebral artery. Anterior cerebral arteries are patent. There are 
bilateral posterior communicating arteries Impression IMPRESSION:   
1. No flow is demonstrated within the right vertebral artery and local stenosis 
flow is noted within the left vertebral artery. No flow is demonstrated within 
the basilar artery. 2. 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 Fusiform dilatation of the left cavernous carotid 4. Marked narrowing of the right cavernous carotid with fusiform dilatation of 
the distal right internal carotid artery 5. Multifocal areas of stenosis in the middle cerebral arteries bilaterally Results from Hospital Encounter encounter on 05/05/19 MRA BRAIN WO CONT Narrative INDICATION:   CVA COMPARISON:  None TECHNIQUE:   
3-D time-of-flight MRA of the brain was performed. Multiplanar reconstructions 
were obtained. FINDINGS: 
There is no flow demonstrated within the distal right vertebral artery and 
minimal to no flow within the distal left vertebral artery. No flow is 
demonstrated within the basilar arteries. . While the internal carotid arteries 
are patent. There is fusiform enlargement of the left cavernous carotid 
measuring 6 mm in diameter. There is an 11 mm slightly irregular left posterior 
communicating artery aneurysm. There is irregular narrowing of the left middle 
cerebral artery with fusiform enlargement at its trifurcation there is narrowing 
of the right cavernous carotid with fusiform enlargement of the right carotid 
terminus measuring a maximum of 8 mm. There is irregular narrowing of the right middle cerebral artery. Anterior cerebral arteries are patent. There are 
bilateral posterior communicating arteries Impression IMPRESSION:   
1. No flow is demonstrated within the right vertebral artery and local stenosis 
flow is noted within the left vertebral artery. No flow is demonstrated within 
the basilar artery. 2. 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 Fusiform dilatation of the left cavernous carotid 4. Marked narrowing of the right cavernous carotid with fusiform dilatation of 
the distal right internal carotid artery 5. Multifocal areas of stenosis in the middle cerebral arteries bilaterally Review of Systems: A comprehensive review of systems was negative except for: Constitutional: positive for fatigue and malaise Eyes: positive for visual disturbance Musculoskeletal: positive for myalgias, arthralgias and stiff joints Neurological: positive for memory problems, coordination problems and gait problems Behvioral/Psych: positive for Memory loss Vitals:  
 05/08/19 0415 05/08/19 0747 05/08/19 1245 05/08/19 1608 BP: 150/85 140/80 132/77 139/69 Pulse: 72 68 63 61 Resp: 18 18 18 18 Temp: 97.9 °F (36.6 °C) 97.9 °F (36.6 °C) 98.2 °F (36.8 °C) 97.6 °F (36.4 °C) SpO2: 99% 100% 100% 100% Weight:      
Height:      
 
Objective: I 
 
 
NEUROLOGICAL EXAM: 
 
Appearance: The patient is well developed, well nourished, provides a fair history and is in no acute distress. Mental Status: Oriented to time, place and person, and the president, cognitive function is low and probably mildly abnormal and speech is fluent and no aphasia or dysarthria. Mood and affect appropriate. Cranial Nerves:   LHH on visual fields. Fundi are benign, disc are flat, no lesions seen on funduscopy. NILE, EOM's full, no nystagmus, no ptosis. Facial sensation is normal. Corneal reflexes are not tested.  Facial movement is asymmetric on the left. Hearing is abnormal bilaterally. Palate is midline with normal sternocleidomastoid and trapezius muscles are normal. Tongue is midline. Neck without meningismus or bruits Temporal arteries are not tender or enlarged TMJ areas are not tender on palpation Motor:  4/5 strength in upper and lower proximal and distal muscles, but patient has a mild left-sided weakness and slight arm drift. Normal bulk and increased tone bilaterally, right side greater than left with cogwheel rigidity more on the right side as compared to the left. . No fasciculations. Rapid alternating movement is normal on the right, but decreased on the left. Reflexes:   Deep tendon reflexes 2+/4 on the left, and 1+/4 on the right No babinski or clonus present Sensory:   Normal to touch, pinprick and vibration and temperature decreased in both feet. DSS is intact Gait:  Not tested gait since patient has restraints on. Tremor:   No tremor noted. Cerebellar:   Left sided dysmetria abnormal cerebellar signs present on finger-nose-finger exam.  Romberg and tandem could not be tested Neurovascular:  Normal heart sounds and regular rhythm, peripheral pulses decreased, and no carotid bruits. Assessment: ICD-10-CM ICD-9-CM 1. Acute ischemic stroke (HCC) I63.9 434.91   
2. Bilateral carotid artery stenosis I65.23 433.10   
  433.30 3. Late onset Alzheimer's disease without behavioral disturbance G30.1 331.0 F02.80 294.10 4. Left homonymous hemianopsia due to recent cerebral infarction I69.398 438.7 H53.462 368.46   
5. Parkinson's disease (tremor, stiffness, slow motion, unstable posture) ([facility] [address] G20 332.0 6. Thrombotic stroke involving right middle cerebral artery ([facility] [address] I63.311 434.01 Active Problems: 
  TIA (transient ischemic attack) (5/5/2019) Thrombotic stroke involving right middle cerebral artery ([address] (5/6/2019) Bilateral carotid artery stenosis (5/6/2019) Left homonymous hemianopsia due to recent cerebral infarction (5/6/2019) Parkinson's disease (tremor, stiffness, slow motion, unstable posture) ([address] (5/6/2019) Late onset Alzheimer's disease without behavioral disturbance (5/6/2019) Stroke (cerebrum) ([address] (5/6/2019) Plan:  
 
Patient with new left cerebellar infarct, and bilateral vertebral artery disease and basilar artery disease and possible aneurysm of the posterior cerebral artery, so we will get CTA to define all these lesions and then see whether or not he needs intervention Patient appears to have a mild left hemiparesis and left homonymous hemianopsia, and the question is this is new or old, and he needs an MRI scan. I reviewed his MRI and MRA personally on the PACS system myself and I agree with reports as above his need for CTA. Have discussed with the hospitalist we will hydrate him today and then do CTA We will increase his Aggrenox to twice a day, and continue his Sinemet but increase the dose to 3 times a day of the lower version. Difficult case, discussed with the patient and the family in detail, need to make sure there is no cardioembolic source, or other neurologic cause for his symptoms. Patient obviously a high risk for further neurological decompensation or worsening severe morbidity and possibly mortality if his disease of Alzheimer's disease, vascular disease is prominent, although untreated in addition to his Parkinson's disease Continue excellent medical care as you are, we will follow closely with you. Discussed with hospitalist and the family. Signed By: Jam Garcia MD   
 May 8, 2019 CC: Ros Smiley NP 
FAX: [phone]

## 2019-05-09 ENCOUNTER — APPOINTMENT (OUTPATIENT)
Dept: CT IMAGING | Age: 79
DRG: 065 | End: 2019-05-09
Attending: INTERNAL MEDICINE
Payer: MEDICARE

## 2019-05-09 LAB
ANION GAP SERPL CALC-SCNC: 9 MMOL/L (ref 5–15)
BUN SERPL-MCNC: 15 MG/DL (ref 6–20)
BUN/CREAT SERPL: 9 (ref 12–20)
CALCIUM SERPL-MCNC: 8.5 MG/DL (ref 8.5–10.1)
CHLORIDE SERPL-SCNC: 108 MMOL/L (ref 97–108)
CO2 SERPL-SCNC: 21 MMOL/L (ref 21–32)
CREAT SERPL-MCNC: 1.66 MG/DL (ref 0.7–1.3)
GLUCOSE SERPL-MCNC: 122 MG/DL (ref 65–100)
POTASSIUM SERPL-SCNC: 4.1 MMOL/L (ref 3.5–5.1)
SODIUM SERPL-SCNC: 138 MMOL/L (ref 136–145)

## 2019-05-09 PROCEDURE — 97530 THERAPEUTIC ACTIVITIES: CPT

## 2019-05-09 PROCEDURE — 36415 COLL VENOUS BLD VENIPUNCTURE: CPT

## 2019-05-09 PROCEDURE — 97112 NEUROMUSCULAR REEDUCATION: CPT

## 2019-05-09 PROCEDURE — 74011250636 HC RX REV CODE- 250/636: Performed by: INTERNAL MEDICINE

## 2019-05-09 PROCEDURE — 74011250637 HC RX REV CODE- 250/637: Performed by: PSYCHIATRY & NEUROLOGY

## 2019-05-09 PROCEDURE — 65660000000 HC RM CCU STEPDOWN

## 2019-05-09 PROCEDURE — 74011636320 HC RX REV CODE- 636/320: Performed by: INTERNAL MEDICINE

## 2019-05-09 PROCEDURE — 74011250637 HC RX REV CODE- 250/637: Performed by: INTERNAL MEDICINE

## 2019-05-09 PROCEDURE — 70496 CT ANGIOGRAPHY HEAD: CPT

## 2019-05-09 PROCEDURE — 80048 BASIC METABOLIC PNL TOTAL CA: CPT

## 2019-05-09 RX ORDER — SODIUM CHLORIDE 0.9 % (FLUSH) 0.9 %
10 SYRINGE (ML) INJECTION
Status: COMPLETED | OUTPATIENT
Start: 2019-05-09 | End: 2019-05-09

## 2019-05-09 RX ADMIN — ONDANSETRON 4 MG: 2 INJECTION INTRAMUSCULAR; INTRAVENOUS at 04:24

## 2019-05-09 RX ADMIN — AMLODIPINE BESYLATE 10 MG: 5 TABLET ORAL at 08:59

## 2019-05-09 RX ADMIN — Medication 10 ML: at 04:24

## 2019-05-09 RX ADMIN — CARBIDOPA AND LEVODOPA 1 TABLET: 25; 100 TABLET ORAL at 08:59

## 2019-05-09 RX ADMIN — PRAVASTATIN SODIUM 80 MG: 40 TABLET ORAL at 08:59

## 2019-05-09 RX ADMIN — FAMOTIDINE 20 MG: 20 TABLET ORAL at 08:59

## 2019-05-09 RX ADMIN — Medication 10 ML: at 14:00

## 2019-05-09 RX ADMIN — ASPIRIN AND DIPYRIDAMOLE 1 CAPSULE: 25; 200 CAPSULE, EXTENDED RELEASE ORAL at 08:59

## 2019-05-09 RX ADMIN — Medication 10 ML: at 22:17

## 2019-05-09 RX ADMIN — POLYETHYLENE GLYCOL 3350 17 G: 17 POWDER, FOR SOLUTION ORAL at 08:58

## 2019-05-09 RX ADMIN — ASPIRIN AND DIPYRIDAMOLE 1 CAPSULE: 25; 200 CAPSULE, EXTENDED RELEASE ORAL at 18:16

## 2019-05-09 RX ADMIN — CITALOPRAM HYDROBROMIDE 10 MG: 20 TABLET ORAL at 18:16

## 2019-05-09 RX ADMIN — CARBIDOPA AND LEVODOPA 1 TABLET: 25; 100 TABLET ORAL at 16:05

## 2019-05-09 RX ADMIN — Medication 10 ML: at 15:06

## 2019-05-09 RX ADMIN — SODIUM CHLORIDE 75 ML/HR: 900 INJECTION, SOLUTION INTRAVENOUS at 20:25

## 2019-05-09 RX ADMIN — CARBIDOPA AND LEVODOPA 1 TABLET: 25; 100 TABLET ORAL at 22:16

## 2019-05-09 RX ADMIN — IOPAMIDOL 100 ML: 755 INJECTION, SOLUTION INTRAVENOUS at 14:00

## 2019-05-09 RX ADMIN — HEPARIN SODIUM 5000 UNITS: 5000 INJECTION INTRAVENOUS; SUBCUTANEOUS at 12:19

## 2019-05-09 RX ADMIN — HEPARIN SODIUM 5000 UNITS: 5000 INJECTION INTRAVENOUS; SUBCUTANEOUS at 04:24

## 2019-05-09 RX ADMIN — METOPROLOL SUCCINATE 25 MG: 25 TABLET, EXTENDED RELEASE ORAL at 08:59

## 2019-05-09 RX ADMIN — HEPARIN SODIUM 5000 UNITS: 5000 INJECTION INTRAVENOUS; SUBCUTANEOUS at 20:40

## 2019-05-09 NOTE — ROUTINE PROCESS
Bedside shift change report given to Marivel RN (oncoming nurse) by Francoise Ha RN (offgoing nurse). Report included the following information SBAR, Kardex, Intake/Output, MAR and Recent Results. 
  
Zone Phone:   [phone] 
  
  
Significant changes during shift:  none 
  
  
  
Plan for upcoming shift: Labs; safety. 
  
  
  
Discharge Plan: Yes [facility] when Stable. 
  
Active Consults: 
IP CONSULT TO HOSPITALIST 
IP CONSULT TO NEUROLOGY 
  
  
Patient Information 
  
Alison Clements 69 y.o. 
5/5/2019  3:15 PM by Queen Raya MD. Alison Clements was admitted from Home 
  
Problem List 
  
    
Patient Active Problem List  
  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  
  
    
Past Medical History:  
Diagnosis Date  CAD (coronary artery disease)    
 Hypertension    
 Other ill-defined conditions(799.89)    
  parkinson's  Other ill-defined conditions(799.89)    
  high cholesterol  Stroke [facility]) 2006  
  left sided weakness  
  
* No surgery found * 
  
  
Core Measures: 
  
CVA: Yes CHF:No  
PNA:No  
  
Activity Status: 
  
OOB to Chair Yes Ambulated this shift Yes Bed Rest No 
  
Supplemental O2: (If Applicable) 
  
Room air 
  
  
LINES AND DRAINS: 
  
PIV 
  
  
DVT prophylaxis: 
  
DVT prophylaxis Med- Yes DVT prophylaxis SCD or CARLSO- No  
  
Wounds: (If Applicable) 
  
Wounds- No 
  
Location none 
  
Patient Safety: 
  
Falls Score  Safety Level 4 Bed Alarm On? Yes Sitter?  No

## 2019-05-09 NOTE — PROGRESS NOTES
[place] Plan-- 1)  [facility]. Left message for liason to call regarding bed availability.

## 2019-05-09 NOTE — PROGRESS NOTES
Bedside shift change report given to [facility] (oncoming nurse) by Duc See (offgoing nurse). Report included the following information SBAR, Kardex, Intake/Output, MAR and Recent Results. Zone Phone:   [phone] Significant changes during shift:  Patient was going to have CTA; was put on hold in the evening, pending today. Emesis 50 ml; Zofran 4 mg IV given with result. Plan for upcoming shift: Labs; safety. Discharge Plan: Yes [facility] when Stable. Active Consults: 
IP CONSULT TO HOSPITALIST 
IP CONSULT TO NEUROLOGY Patient Information Rosanna Min 69 y.o. 
5/5/2019  3:15 PM by Brenda Guzman MD. Rosanna Min was admitted from Home 
 
Problem List 
 
Patient Active Problem List  
 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  
Diagnosis Date  CAD (coronary artery disease)  Hypertension  Other ill-defined conditions(799.89)   
 parkinson's  Other ill-defined conditions(799.89)   
 high cholesterol  Stroke [facility]) 2006  
 left sided weakness * No surgery found * Core Measures: CVA: Yes CHF:No  
PNA:No  
 
Activity Status: OOB to Chair Yes Ambulated this shift Yes Bed Rest No 
 
Supplemental O2: (If Applicable) Room air LINES AND DRAINS: 
 
PIV 
 
DVT prophylaxis: DVT prophylaxis Med- Yes DVT prophylaxis SCD or CARLOS- No  
 
Wounds: (If Applicable) Wounds- No 
 
Location none Patient Safety: Falls Score  Safety Level 4 Bed Alarm On? Yes Sitter?  No

## 2019-05-09 NOTE — PROGRESS NOTES
Hospitalist Progress Note NAME: Anam Rowan :  1940 MRN:  [id number] Assessment / Plan: 
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 
prior L cerebellar CVA POA 
+blurred vision, difficulty walking with bilateral leg weakness and slower speech CT head with chronic infarctions. No acute findings. MRI brain with large acute infarct of the inferior left cerebellum MRA brain with no flow demonstrated within the right vertebral artery and local stenosis flow is noted within the left vertebral artery. No flow is demonstrated within 
the basilar artery. 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 Fusiform dilatation of the left cavernous carotid. Marked narrowing of the right cavernous carotid with fusiform dilatation of the distal right internal carotid artery. Multifocal areas of stenosis in the middle cerebral arteries bilaterally. CTA head and neck 2019 Occlusion of the left posterior inferior cerebellar artery just distal to origin. Left vertebral artery is patent with moderate cervical and severe intracranial  
             atherosclerosis. Basilar artery demonstrates very minimal flow and is diminutive in caliber throughout. The distal right vertebral artery is occluded which may be chronic Minimal to no flow in the right posterior cerebral artery may also be chronic. 
    Moderate to severe intracranial atherosclerosis as described above. 
  
   Questionable 2 to 3 mm aneurysm at the junction of the left P1 segment  
            and left P-comm 
   4 mm infundibulum versus aneurysm at the origin of the right posterior Communicating artery 
    Left cavernous internal carotid artery is patent with focal ectasia/fusiform aneurysmal dilatation measuring 7 mm. Wide necked left carotid terminus / P-comm aneurysm measuring 12 x 7 mm Tele NSR with PVCs, no clear atrial fib Echo LVEF 51-55%, normal RV size ,  Con't pravachol, dose increased. Change aggrenox to BID per neuro recs Neuro consult appreciated, sinemet dose adjusted per their recommendations Spoke with Dr Iván Cerda Aneurysm and rupture risk d/w family IVF Awaiting bed at [facility] Essential HTN and CAD POA normotensive Norvasc Metoprolol at decreased dose Hyperglycemia: no h/o DM HgA1c 5.9 Presumed CKD stage 3 POA Dementia with anxiety, depression: con't celexa 
  
Code Status: Full Surrogate Decision Maker: wife  
 
DVT Prophylaxis: heparin 
  
Baseline: ambulates with cane, lives at home with wife and duaghter Subjective: Chief Complaint / Reason for Physician Visit \"No problems\". Discussed with RN events overnight. Review of Systems: 
Symptom Y/N Comments  Symptom Y/N Comments Fever/Chills n   Chest Pain n   
Poor Appetite    Edema n   
Cough n   Abdominal Pain n   
Sputum    Joint Pain SOB/MARTINEZ n   Pruritis/Rash Nausea/vomit n   Tolerating PT/OT Diarrhea    Tolerating Diet y Constipation    Other Could NOT obtain due to:   
 
Objective: VITALS:  
Last 24hrs VS reviewed since prior progress note. Most recent are: 
Patient Vitals for the past 24 hrs: 
 Temp Pulse Resp BP SpO2  
05/09/19 1513 98.1 °F (36.7 °C) 72 18 149/77 99 % 05/09/19 1205 97.5 °F (36.4 °C) 77 16 142/82 100 % 05/09/19 0738 98.2 °F (36.8 °C) 72 18 148/82 99 % 05/09/19 0430 98 °F (36.7 °C) (!) 108 18 (!) 189/92 100 % 05/08/19 2348 97 °F (36.1 °C) 80 18 162/86 100 % 05/08/19 2007 97.3 °F (36.3 °C) 66 18 145/74 99 % Intake/Output Summary (Last 24 hours) at 5/9/2019 1821 Last data filed at 5/9/2019 1504 Gross per 24 hour Intake 1472.5 ml Output 50 ml Net 1422.5 ml PHYSICAL EXAM: 
 General: WD, WN. Alert, cooperative, no acute distress   
EENT:  EOMI. Anicteric sclerae. MMM Resp:  CTA bilaterally, no wheezing or rales. No accessory muscle use CV:  Regular  rhythm,  No edema GI:  Soft, Non distended, Non tender.  +Bowel sounds Neurologic:  Alert and oriented X 3 when I saw normal speech, LLE weakness Psych:   Some insight. Not anxious nor agitated Skin:  No rashes. No jaundice Reviewed most current lab test results and cultures  YES Reviewed most current radiology test results   YES Review and summation of old records today    NO Reviewed patient's current orders and MAR    YES 
PMH/SH reviewed - no change compared to H&P 
________________________________________________________________________ Care Plan discussed with: 
  Comments Patient x Family  x Family at bedside RN x Care Manager x Consultant  x Dr Benjy Recio Multidiciplinary team rounds were held today with , nursing, pharmacist and clinical coordinator. Patient's plan of care was discussed; medications were reviewed and discharge planning was addressed. ________________________________________________________________________ Total NON critical care TIME:  25 Minutes Total CRITICAL CARE TIME Spent:   Minutes non procedure based Comments >50% of visit spent in counseling and coordination of care x   
________________________________________________________________________ Khang Cutler MD  
 
Procedures: see electronic medical records for all procedures/Xrays and details which were not copied into this note but were reviewed prior to creation of Plan. LABS: 
I reviewed today's most current labs and imaging studies. Pertinent labs include: 
Recent Labs 05/08/19 
1021 WBC 4.9 HGB 13.6 HCT 41.0  
 Recent Labs 05/09/19 
0450 05/08/19 
1021  140  
K 4.1 3.7  109* CO2 21 24 * 146* BUN 15 16 CREA 1.66* 1.78* CA 8.5 8.5 ALB  --  3.5 TBILI  --  0.6 SGOT  --  17 ALT  --  6* Signed: Luis Felipe Paez MD

## 2019-05-09 NOTE — PROGRESS NOTES
Bedside shift change report given to Doctor Terrance Ware (oncoming nurse) by Edgard Atkinson RN (offgoing nurse). Report included the following information SBAR, Kardex, Intake/Output, MAR and Recent Results. Zone Phone:   [phone] Significant changes during shift:  Patient was going to have CTA; was put on hold in the evening, pending tomorrow. Plan for upcoming shift: Labs; safety. Discharge Plan: Yes [facility] when Stable. Active Consults: 
IP CONSULT TO HOSPITALIST 
IP CONSULT TO NEUROLOGY Patient Information Alex Hong 69 y.o. 
5/5/2019  3:15 PM by Jam Amador MD. Alex Hong was admitted from Home 
 
Problem List 
 
Patient Active Problem List  
 Diagnosis Date Noted  Cerebral aneurysm without rupture, left PCA 12 mm aneurysm 05/08/2019  Thrombotic stroke involving right middle cerebral artery ([facility] 05/06/2019  Bilateral carotid artery stenosis 05/06/2019  Left homonymous hemianopsia due to recent cerebral infarction 05/06/2019  Parkinson's disease (tremor, stiffness, slow motion, unstable posture) ([address] 05/06/2019  Late onset Alzheimer's disease without behavioral disturbance 05/06/2019  Stroke (cerebrum) ([address] 05/06/2019  TIA (transient ischemic attack) 05/05/2019 Past Medical History:  
Diagnosis Date  CAD (coronary artery disease)  Hypertension  Other ill-defined conditions(799.89)   
 parkinson's  Other ill-defined conditions(799.89)   
 high cholesterol  Stroke [facility]) 2006  
 left sided weakness * No surgery found * Core Measures: CVA: Yes CHF:No  
PNA:No  
 
Activity Status: OOB to Chair Yes Ambulated this shift Yes Bed Rest No 
 
Supplemental O2: (If Applicable) NC No 
On 0 Liters/min LINES AND DRAINS: 
 
Central Line? No  
 
PICC LINE? No  
 
Urinary Catheter? No  
DVT prophylaxis: DVT prophylaxis Med- Yes DVT prophylaxis SCD or CARLOS- No  
 
Wounds: (If Applicable) Wounds- No 
 
Location none Patient Safety: 
 
Falls Score Total Score: 5 Safety Level_______ Bed Alarm On? Yes Sitter?  No

## 2019-05-09 NOTE — PROGRESS NOTES
Per Ellis Nicole with [facility] they do not have a bed for today so far however she has placed patient on the list for tomorrow if medically stable.

## 2019-05-09 NOTE — PROGRESS NOTES
Problem: Mobility Impaired (Adult and Pediatric) Goal: *Acute Goals and Plan of Care (Insert Text) Description Physical Therapy Goals Initiated 5/6/2019 1. Patient will move from supine to sit and sit to supine , scoot up and down and roll side to side in bed with modified independence within 7 day(s). 2.  Patient will transfer from bed to chair and chair to bed with minimal assistance/contact guard assist using the least restrictive device within 7 day(s). 3.  Patient will perform sit to stand with minimal assistance/contact guard assist within 7 day(s). 4.  Patient will ambulate with minimal assistance/contact guard assist for 100 feet with the least restrictive device within 7 day(s). 5.  Patient will ascend/descend 3 stairs with 2 handrail(s) with minimal assistance/contact guard assist within 7 day(s). 6.  Patient will improve Mike Balance score by 7 points within 7 days. Outcome: Not Progressing Towards Goal 
 PHYSICAL THERAPY TREATMENT Patient: Khloe Menard (40 y.o. male) Date: 5/9/2019 Diagnosis: TIA (transient ischemic attack) [G45.9] Stroke (cerebrum) (HCC) [I63.9] <principal problem not specified> Precautions: Bed Alarm Chart, physical therapy assessment, plan of care and goals were reviewed. ASSESSMENT: 
Patient received supine in bed, agreeable and cleared by nursing for therapy. For bed mobility, pt required CGA for rolling and sit<>supine, however required Lonnie for scooting for alignment and management of LLE. In sitting, patient demonstrates an excessive L trunk lean with very little awareness, requiring assistance to avoid LOB. Max verbal and tactile cues to encourage R trunk lean to achieve midline posture. Additionally, pt had difficulty with R cervical rotation and was unable to maintain this position for more than a few seconds.  Patient instructed to use LUE for hand taps in various directions EOB and to recall number of fingers being held up to assess visual impairments. Patient had difficulty with accuracy of reaching for target and recalling number of fingers being held up, likely due to L homonymous hemianopsia. Attempted sit<>stand to RW which required totalA x2 to complete and patient unable to process hand placement on RW and maintain balance. Therefore, another sit<>stand attempted with HHA. T/o all stands, significant L lean noted as well as L flexion synergy and R extension synergy, presenting like pusher syndrome. Unable to assess gait this date due to amount of assistance needed for standing. Patient left sitting with bed in chair position and pillows on L side to maintain good postural alignment at conclusion of session. Nursing notified of change in presentation compared to yesterday. Recommend IP Rehab at discharge. Progression toward goals: 
?       Improving appropriately and progressing toward goals ? Improving slowly and progressing toward goals; current change in presentation noted and will be monitored ? Not making progress toward goals and plan of care will be adjusted PLAN: 
Patient continues to benefit from skilled intervention to address the above impairments. Continue treatment per established plan of care. Discharge Recommendations:  Inpatient Rehab Further Equipment Recommendations for Discharge:  TBD SUBJECTIVE:  
Patient stated I felt a little nauseous earlier this morning, but now I am feeling okay.  OBJECTIVE DATA SUMMARY:  
Critical Behavior: 
Neurologic State: Alert, Confused Orientation Level: Oriented X4(periods of confusion) Cognition: Follows commands Safety/Judgement: Fall prevention Functional Mobility Training: 
Bed Mobility: 
Rolling: Contact guard assistance(cues for RUE hand placement) Supine to Sit: Contact guard assistance Sit to Supine: Contact guard assistance Scooting: Minimum assistance(assistance with LLE alignment) Transfers: Sit to Stand: Total assistance;Assist x2 Stand to Sit: Total assistance;Assist x2 Balance: 
Sitting: Impaired; With support(excessive L lean) Sitting - Static: Poor (constant support)(L trunk lean) Sitting - Dynamic: Poor (constant support) Standing: Impaired; With support Standing - Static: Poor;Constant support Standing - Dynamic : Poor;Constant support Ambulation/Gait Training: 
Unable to assess due to [address] x2 for standing transfer. Neuro Re-Education: 
Max verbal cues to encourage R trunk lean, R shoulder relaxation, and R cervical rotation/SB for improved alignment. Had pt perform multiple R trunk leans with tactile cues to R forearm and R shoulder to relax and push into bed. Tracking and LUE activation with hand taps in various directions to challenge dynamic balance and L trunk elongation. Pain: 
Pain Scale 1: Numeric (0 - 10) Pain Intensity 1: 0 Activity Tolerance:  
Fair Please refer to the flowsheet for vital signs taken during this treatment. After treatment:  
? Patient left in no apparent distress sitting up in chair ? Patient left in no apparent distress with bed in chair position ? Call bell left within reach ? Nursing notified ? Caregiver present ? Bed alarm activated COMMUNICATION/EDUCATION:  
The patients plan of care was discussed with: Registered Nurse and . ?  Fall prevention education was provided and the patient/caregiver indicated understanding. ? Patient/family have participated as able in goal setting and plan of care. ?  Patient/family agree to work toward stated goals and plan of care. ?  Patient understands intent and goals of therapy, but is neutral about his/her participation. ? Patient is unable to participate in goal setting and plan of care. Thank you for this referral. 
[employer] Time Calculation: 48 mins Regarding student involvement in patient care: A student participated in this treatment session. Per CMS Medicare statements and APTA guidelines I certify that the following was true: 1. I was present and directly observed the entire session. 2. I made all skilled judgments and clinical decisions regarding care. 3. I am the practitioner responsible for assessment, treatment, and documentation.

## 2019-05-10 VITALS
HEART RATE: 61 BPM | BODY MASS INDEX: 23.94 KG/M2 | DIASTOLIC BLOOD PRESSURE: 80 MMHG | SYSTOLIC BLOOD PRESSURE: 142 MMHG | RESPIRATION RATE: 18 BRPM | WEIGHT: 176.75 LBS | TEMPERATURE: 97.7 F | HEIGHT: 72 IN | OXYGEN SATURATION: 99 %

## 2019-05-10 LAB
ANION GAP SERPL CALC-SCNC: 5 MMOL/L (ref 5–15)
BUN SERPL-MCNC: 14 MG/DL (ref 6–20)
BUN/CREAT SERPL: 10 (ref 12–20)
CALCIUM SERPL-MCNC: 8.3 MG/DL (ref 8.5–10.1)
CHLORIDE SERPL-SCNC: 111 MMOL/L (ref 97–108)
CO2 SERPL-SCNC: 24 MMOL/L (ref 21–32)
CREAT SERPL-MCNC: 1.47 MG/DL (ref 0.7–1.3)
GLUCOSE SERPL-MCNC: 94 MG/DL (ref 65–100)
POTASSIUM SERPL-SCNC: 3.8 MMOL/L (ref 3.5–5.1)
SODIUM SERPL-SCNC: 140 MMOL/L (ref 136–145)

## 2019-05-10 PROCEDURE — 74011250636 HC RX REV CODE- 250/636: Performed by: INTERNAL MEDICINE

## 2019-05-10 PROCEDURE — 74011250637 HC RX REV CODE- 250/637: Performed by: PSYCHIATRY & NEUROLOGY

## 2019-05-10 PROCEDURE — 74011250637 HC RX REV CODE- 250/637: Performed by: INTERNAL MEDICINE

## 2019-05-10 PROCEDURE — 97535 SELF CARE MNGMENT TRAINING: CPT | Performed by: OCCUPATIONAL THERAPIST

## 2019-05-10 PROCEDURE — 36415 COLL VENOUS BLD VENIPUNCTURE: CPT

## 2019-05-10 PROCEDURE — 80048 BASIC METABOLIC PNL TOTAL CA: CPT

## 2019-05-10 PROCEDURE — 97116 GAIT TRAINING THERAPY: CPT

## 2019-05-10 PROCEDURE — 94760 N-INVAS EAR/PLS OXIMETRY 1: CPT

## 2019-05-10 RX ORDER — PRAVASTATIN SODIUM 80 MG/1
80 TABLET ORAL DAILY
Qty: 30 TAB | Refills: 6 | Status: ON HOLD
Start: 2019-05-10 | End: 2021-01-01

## 2019-05-10 RX ORDER — ASPIRIN AND DIPYRIDAMOLE 25; 200 MG/1; MG/1
1 CAPSULE, EXTENDED RELEASE ORAL 2 TIMES DAILY
Qty: 60 CAP | Refills: 3 | Status: SHIPPED | OUTPATIENT
Start: 2019-05-10 | End: 2020-05-27

## 2019-05-10 RX ORDER — CARBIDOPA AND LEVODOPA 25; 100 MG/1; MG/1
1 TABLET ORAL 3 TIMES DAILY
Qty: 90 TAB | Refills: 3 | Status: SHIPPED
Start: 2019-05-10

## 2019-05-10 RX ORDER — METOPROLOL SUCCINATE 25 MG/1
25 TABLET, EXTENDED RELEASE ORAL DAILY
Qty: 30 TAB | Refills: 6 | Status: ON HOLD | OUTPATIENT
Start: 2019-05-11 | End: 2021-01-01

## 2019-05-10 RX ADMIN — Medication 10 ML: at 04:14

## 2019-05-10 RX ADMIN — HEPARIN SODIUM 5000 UNITS: 5000 INJECTION INTRAVENOUS; SUBCUTANEOUS at 04:16

## 2019-05-10 RX ADMIN — AMLODIPINE BESYLATE 10 MG: 5 TABLET ORAL at 09:04

## 2019-05-10 RX ADMIN — FAMOTIDINE 20 MG: 20 TABLET ORAL at 09:04

## 2019-05-10 RX ADMIN — POLYETHYLENE GLYCOL 3350 17 G: 17 POWDER, FOR SOLUTION ORAL at 09:05

## 2019-05-10 RX ADMIN — HEPARIN SODIUM 5000 UNITS: 5000 INJECTION INTRAVENOUS; SUBCUTANEOUS at 11:44

## 2019-05-10 RX ADMIN — CARBIDOPA AND LEVODOPA 1 TABLET: 25; 100 TABLET ORAL at 09:04

## 2019-05-10 RX ADMIN — CITALOPRAM HYDROBROMIDE 10 MG: 20 TABLET ORAL at 17:23

## 2019-05-10 RX ADMIN — PRAVASTATIN SODIUM 80 MG: 40 TABLET ORAL at 09:04

## 2019-05-10 RX ADMIN — CARBIDOPA AND LEVODOPA 1 TABLET: 25; 100 TABLET ORAL at 17:23

## 2019-05-10 RX ADMIN — ASPIRIN AND DIPYRIDAMOLE 1 CAPSULE: 25; 200 CAPSULE, EXTENDED RELEASE ORAL at 17:23

## 2019-05-10 RX ADMIN — ASPIRIN AND DIPYRIDAMOLE 1 CAPSULE: 25; 200 CAPSULE, EXTENDED RELEASE ORAL at 09:04

## 2019-05-10 RX ADMIN — METOPROLOL SUCCINATE 25 MG: 25 TABLET, EXTENDED RELEASE ORAL at 09:04

## 2019-05-10 NOTE — DISCHARGE INSTRUCTIONS
HOSPITALIST DISCHARGE INSTRUCTIONS    NAME: Brandon Awad   :  1940   MRN:  [id number]     Date/Time:  5/10/2019 6:07 PM    ADMIT DATE: 2019   DISCHARGE DATE: 5/10/2019     Attending Physician: Mady Orr MD    DISCHARGE DIAGNOSIS:    Left cerebellar stroke    HTN      Medications: Per above medication reconciliation. Pain Management: per above medications    Recommended diet: {diet:64017}    Recommended activity: {discharge activity:38581}    Wound care: {WOUND VSJ:17999447}    Indwelling devices:  {INDWELLING DEVICES:24619557}    Supplemental Oxygen: {SUPPLEMENTAL 02:20823957}    Required Lab work: {REQUIRED LAB IFTT:84867758}    Glucose management:  {GLUCOSE MANAGEMENT:58475568}    Code status: {Status:76554}        Outside physician follow up: Follow-up Information     Follow up With Specialties Details Why Contact Info    [address] [place] [address]. Patient will be going to [address] [place] [address] for rehab when medically stable. Radha Hodge, NP Family Practice Schedule an appointment as soon as possible for a visit in 2 weeks Please call to schedule your hospital follow up [address] [facility] [address]  [phone]      Kaye Stone MD Neurology Go on 2019 Please follow up on 2019 at 9:40 arriving 15 minutes early to register [facility]  [phone]                 Skilled nursing facility/ SNF MD responsible for above on discharge. Information obtained by :  I understand that if any problems occur once I am at home I am to contact my physician. I understand and acknowledge receipt of the instructions indicated above.                                                                                                                                            Physician's or R.N.'s Signature                                                                  Date/Time Patient or Repres

## 2019-05-10 NOTE — ROUTINE PROCESS
Called report to [facility]. Used SBAR, MAR, Kardex and doc flow. All questions answered. Gave my name and number incase she had more questions later.

## 2019-05-10 NOTE — PROGRESS NOTES
[place] Plan: 
 
 1) Phone call from [facility] stating that they had planned for patient's discharge today, but beds were not available. [facility] now stating no bed possible until possibly Monday, 5/13/2019 
 
 2) Referral sent to [facility] after offering [address] for expedited review. 3)  CM to speak to patient and family for alternate discharge plan. - nursing notified of delay and will notify MD. Kandace Torrez, RN, BSN, AC [address] [facility]   [phone]

## 2019-05-10 NOTE — PROGRESS NOTES
Problem: Mobility Impaired (Adult and Pediatric) Goal: *Acute Goals and Plan of Care (Insert Text) Description Physical Therapy Goals Initiated 5/6/2019 1. Patient will move from supine to sit and sit to supine , scoot up and down and roll side to side in bed with modified independence within 7 day(s). 2.  Patient will transfer from bed to chair and chair to bed with minimal assistance/contact guard assist using the least restrictive device within 7 day(s). 3.  Patient will perform sit to stand with minimal assistance/contact guard assist within 7 day(s). 4.  Patient will ambulate with minimal assistance/contact guard assist for 100 feet with the least restrictive device within 7 day(s). 5.  Patient will ascend/descend 3 stairs with 2 handrail(s) with minimal assistance/contact guard assist within 7 day(s). 6.  Patient will improve Mike Balance score by 7 points within 7 days. Outcome: Progressing Towards Goal 
 PHYSICAL THERAPY TREATMENT Patient: Jayne Gold (59 y.o. male) Date: 5/10/2019 Diagnosis: TIA (transient ischemic attack) [G45.9] Stroke (cerebrum) (HCC) [I63.9] <principal problem not specified> Precautions: Bed Alarm Chart, physical therapy assessment, plan of care and goals were reviewed. ASSESSMENT: 
Pt received supine in bed with visitors present and agreeable to PT intervention. Pt cleared by nursing for mobility. Patient with slow progress towards therapy goals, however with improved mobility compared to prior therapy session. Currently, patient requires min A for bed mobility, mod-max A x 2 for transfers, and max A x 2 and RW support for short distance ambulation. Patient exhibits strong L lateral lean in sitting, standing, and during gait training. He requires max A to remain standing and for fall prevention with use of RW due to L lateral and mild posterior lean.  Patient able to verbalize that he is aware of lean, however does not initiate any attempts to correct. He performed ADLs in sitting on BSC, however required assistance for sitting balance due to L lateral lean. He continues to be very rigid throughout his mobility and needs mod A for RW management during gait, especially with directional changes. Pt was left sitting in bedside chair with all needs met, RN aware, visitors present, and chair alarm on following therapy session. Continue to recommend patient discharge to inpatient rehab to improve functional mobility and independence prior to returning home. Progression toward goals: 
?    Improving appropriately and progressing toward goals ? Improving slowly and progressing toward goals ? Not making progress toward goals and plan of care will be adjusted PLAN: 
Patient continues to benefit from skilled intervention to address the above impairments. Continue treatment per established plan of care. Discharge Recommendations:  Inpatient Rehab Further Equipment Recommendations for Discharge:  TBD by rehab SUBJECTIVE:  
Patient stated ? Thank you.? OBJECTIVE DATA SUMMARY:  
Critical Behavior: 
Neurologic State: Alert Orientation Level: Oriented X4(periods of confustion) Cognition: Follows commands Safety/Judgement: Decreased awareness of need for safety, Decreased awareness of need for assistance, Fall prevention Functional Mobility Training: 
Bed Mobility: 
  
Supine to Sit: Minimum assistance Scooting: Minimum assistance Transfers: 
Sit to Stand: Additional time;Assist x2; Moderate assistance Stand to Sit: Maximum assistance;Assist x2 Bed to Chair: Maximum assistance; Additional time;Assist x2(heavy lean to left) Balance: 
Sitting: Impaired Sitting - Static: Fair (occasional) Sitting - Dynamic: Poor (constant support)(heavy left lean) Standing: Impaired Standing - Static: Constant support;Poor Standing - Dynamic : Poor(none heavy pushing to left in standing) Ambulation/Gait Training: 
Distance (ft): 10 Feet (ft)(additional 3 ft after seated break) Assistive Device: Gait belt;Walker, rolling Ambulation - Level of Assistance: Maximum assistance;Assist x2; Additional time; Adaptive equipment Gait Abnormalities: Decreased step clearance;Shuffling gait; Step to gait(strong L lateral lean) Base of Support: Shift to left;Center of gravity altered Speed/Marcela: Slow;Shuffled Step Length: Left shortened;Right shortened Swing Pattern: Right asymmetrical;Left asymmetrical 
 
Pain: No pain complaints Activity Tolerance:  
Fair - increased fatigue with activity; no pain complaints Please refer to the flowsheet for vital signs taken during this treatment. After treatment:  
?    Patient left in no apparent distress sitting up in chair ? Patient left in no apparent distress in bed 
? Call bell left within reach ? Nursing notified ? Caregiver present ? Bed alarm activated COMMUNICATION/COLLABORATION:  
The patient?s plan of care was discussed with: Physical Therapist, Occupational Therapist and Registered Nurse Jerolyn Hamman, PT, DPT Time Calculation: 24 mins

## 2019-05-10 NOTE — ROUTINE PROCESS
Bedside shift change report given to [address] [place] [address] (oncoming nurse) by Miller Ott RN (offgoing nurse). Report included the following information SBAR, Kardex, Intake/Output, MAR and Recent Results. 
  
Zone Phone:   [phone] 
  
  
Significant changes during shift:  none 
  
  
  
Plan for upcoming shift: Labs; safety. 
  
  
  
Discharge Plan: Yes [facility] when Stable. 
  
Active Consults: 
IP CONSULT TO HOSPITALIST 
IP CONSULT TO NEUROLOGY 
  
  
Patient Information 
  
Filemon Patricia 69 y.o. 
5/5/2019  3:15 PM by Hebert Aguirre MD. Filemon Patricia was admitted from Home 
  
Problem List 
  
    
Patient Active Problem List  
  Diagnosis Date Noted  Cerebral aneurysm without rupture, left PCA 12 mm aneurysm 05/08/2019  Thrombotic stroke involving right middle cerebral artery ([facility] 05/06/2019  Bilateral carotid artery stenosis 05/06/2019  Left homonymous hemianopsia due to recent cerebral infarction 05/06/2019  Parkinson's disease (tremor, stiffness, slow motion, unstable posture) ([address] 05/06/2019  Late onset Alzheimer's disease without behavioral disturbance 05/06/2019  Stroke (cerebrum) ([facility] 05/06/2019  TIA (transient ischemic attack) 05/05/2019  
  
    
Past Medical History:  
Diagnosis Date  CAD (coronary artery disease)    
 Hypertension    
 Other ill-defined conditions(799.89)    
  parkinson's  Other ill-defined conditions(799.89)    
  high cholesterol  Stroke [facility]) 2006  
  left sided weakness  
  
* No surgery found * 
  
  
Core Measures: 
  
CVA: Yes CHF:No  
PNA:No  
  
Activity Status: 
  
OOB to Chair Yes Ambulated this shift Yes Bed Rest No 
  
Supplemental O2: (If Applicable) 
  
Room air 
  
  
LINES AND DRAINS: 
  
PIV 
  
  
DVT prophylaxis: 
  
DVT prophylaxis Med- Yes DVT prophylaxis SCD or CARLOS- No  
  
Wounds: (If Applicable) 
  
Wounds- No 
  
Location none 
  
Patient Safety: 
  
Falls Score  Safety Level 4 Bed Alarm On? Yes Sitter?  No

## 2019-05-10 NOTE — PROGRESS NOTES
Transitional Care Discharge HUG Note ED [facility] Patient: Sonal German MRN: [id number]  SSN: [id number] YOB: 1940  Age: 69 y.o. Sex: male Admit Date: 5/5/2019 LOS: 4 days Assessment /Risk 
 
WALESKA Diagnosis: 1. Left Cerebellar CVA 2. Parkinson's with dementia 3. Multiple Intracranial Aneurysms - referral to [facility] as OP 4. CKD3 
5. HTN Readmission Risks:   HIGH 1. High risk for repeat CVA  
        -  ischemic and hemorrhagic -  CVA x 2 2. Progressive neuromuscular disease -PD 3. Questionable judgement r/t #2 4. Mobility and safety r/t #1 Fluid Status: NA 
 
Nurse Navigator: not assigned WALESKA appointment with [facility] 
 
18.5 - 24.9 Normal weight / Body mass index is 23.97 kg/m². Code status: Full Recommended Disposition: [facility]/Rehab Subjective:  
 
68 yo frail AA male admitted following difficulty getting OOB due to BLE weakness noted. SO also endorses speech changes. Complicated neuro history with now dx multiple intracranial aneurysms. Referral to [facility] to be completed as outpatient. Established dx of Parkinson's and dementia. CTA demonstrated RVA Occlusion and LVA stenosis with dimminutive Basilar artery . Due to patients advanced age and overall decreased life expectancy treatment of neurovascular disease will need to be evaluated. Prior CVA noted. Upon PE he has slower speech, oriented and able to describe situation prompting admission. ACP needs to addressed. His NOK are children who do not reside in [address] [place] [address], He has a long term partner, who has been assisting him with daily needs and medication management. He is to be discharged to [facility] for continuation of Rehab. Number of Admissions this year  1 Heart Failure Bundle NO Advance Care Plan:  
 
Needs completion ROS:  
 
A comprehensive ROS was performed and was negative except for as per HPI. Objective: No Known Allergies Past Medical History: Diagnosis Date  CAD (coronary artery disease)  Hypertension  Other ill-defined conditions(799.89)   
 parkinson's  Other ill-defined conditions(799.89)   
 high cholesterol  Stroke [facility]) 2006  
 left sided weakness Past Surgical History:  
Procedure Laterality Date  HX CATARACT REMOVAL Bilateral   
 HX HEART CATHETERIZATION  2006  ME COLONOSCOPY FLX DX W/COLLJ SPEC WHEN PFRMD  5/12/2014 Social History Tobacco Use Smoking Status Former Smoker Smokeless Tobacco Never Used Current Facility-Administered Medications Medication Dose Route Frequency  0.9% sodium chloride infusion  75 mL/hr IntraVENous CONTINUOUS  
 sodium chloride (NS) flush 5-40 mL  5-40 mL IntraVENous Q8H  
 sodium chloride (NS) flush 5-40 mL  5-40 mL IntraVENous PRN  
 acetaminophen (TYLENOL) tablet 650 mg  650 mg Oral Q4H PRN Or  
 acetaminophen (TYLENOL) solution 650 mg  650 mg Per NG tube Q4H PRN Or  
 acetaminophen (TYLENOL) suppository 650 mg  650 mg Rectal Q4H PRN  
 heparin (porcine) injection 5,000 Units  5,000 Units SubCUTAneous Q8H  
 citalopram (CELEXA) tablet 10 mg  10 mg Oral QPM  
 polyethylene glycol (MIRALAX) packet 17 g  17 g Oral DAILY  amLODIPine (NORVASC) tablet 10 mg  10 mg Oral DAILY  ondansetron (ZOFRAN) injection 4 mg  4 mg IntraVENous Q6H PRN  pravastatin (PRAVACHOL) tablet 80 mg  80 mg Oral DAILY  aspirin-dipyridamole (AGGRENOX)  mg per capsule 1 Cap  1 Cap Oral BIDPC  carbidopa-levodopa (SINEMET)  mg per tablet 1 Tab  1 Tab Oral TID  famotidine (PEPCID) tablet 20 mg  20 mg Oral DAILY  metoprolol succinate (TOPROL-XL) XL tablet 25 mg  25 mg Oral DAILY Prior to Admission Medications Prescriptions Last Dose Informant Patient Reported? Taking? amLODIPine (NORVASC) 10 mg tablet 5/5/2019 at Unknown time Significant Other Yes Yes Sig: Take 10 mg by mouth daily. aspirin 81 mg chewable tablet 5/5/2019 at Unknown time Significant Other Yes Yes Sig: Take 81 mg by mouth daily. calcium-cholecalciferol, D3, (CALTRATE 600+D) tablet 5/5/2019 at Unknown time Significant Other Yes Yes Sig: Take 1 Tab by mouth daily. carbidopa-levodopa ER (SINEMET CR)  mg per tablet 5/5/2019 at Unknown time Significant Other Yes Yes Sig: Take 1 Tab by mouth daily. citalopram (CELEXA) 10 mg tablet Not Taking at Unknown time Significant Other Yes No  
Sig: Take 10 mg by mouth every evening. dipyridamole-aspirin (AGGRENOX) 200-25 mg per SR capsule 5/5/2019 at Unknown time Significant Other Yes Yes Sig: Take 1 Cap by mouth daily. metoprolol succinate (TOPROL-XL) 100 mg XL tablet 5/5/2019 at Unknown time Significant Other Yes Yes Sig: Take 100 mg by mouth daily. polyethylene glycol (MIRALAX) 17 gram/dose powder 4/28/2019 at Unknown time Significant Other Yes Yes Sig: Take 17 g by mouth daily as needed (constipation). pravastatin (PRAVACHOL) 20 mg tablet 5/5/2019 at Unknown time Significant Other Yes Yes Sig: Take 20 mg by mouth daily. raNITIdine (ZANTAC) 150 mg tablet 5/5/2019 at Unknown time Significant Other Yes Yes Sig: Take 150 mg by mouth daily. Facility-Administered Medications: None Patient Vitals for the past 24 hrs: 
 Temp Pulse Resp BP SpO2  
05/10/19 0757 98.3 °F (36.8 °C) 66 18 156/85 97 % 05/10/19 0409 98.6 °F (37 °C) 63 18 139/70 97 % 05/09/19 2219 98.7 °F (37.1 °C) 69 18 160/68 97 % 05/09/19 1942 98.3 °F (36.8 °C) 67 20 125/51 97 % 05/09/19 1513 98.1 °F (36.7 °C) 72 18 149/77 99 % 05/09/19 1205 97.5 °F (36.4 °C) 77 16 142/82 100 % Intake and Output: 
 
Intake/Output Summary (Last 24 hours) at 5/10/2019 1032 Last data filed at 5/9/2019 2043 Gross per 24 hour Intake  Output 275 ml Net -275 ml PHYSICAL EXAM: 
 
Visit Vitals /85 (BP 1 Location: Right arm, BP Patient Position: At rest) Pulse 66  
 Temp 98.3 °F (36.8 °C) Resp 18 Ht 6' (1.829 m) Wt 80.2 kg (176 lb 12 oz) SpO2 97% BMI 23.97 kg/m² General appearance: 
Alert & oriented X 3, Cooperative JENKINS 
CTA RRR Lab/Data Review:  
Recent Results (from the past 24 hour(s)) METABOLIC PANEL, BASIC Collection Time: 05/10/19  4:23 AM  
Result Value Ref Range Sodium 140 136 - 145 mmol/L Potassium 3.8 3.5 - 5.1 mmol/L Chloride 111 (H) 97 - 108 mmol/L  
 CO2 24 21 - 32 mmol/L Anion gap 5 5 - 15 mmol/L Glucose 94 65 - 100 mg/dL BUN 14 6 - 20 MG/DL Creatinine 1.47 (H) 0.70 - 1.30 MG/DL  
 BUN/Creatinine ratio 10 (L) 12 - 20 GFR est AA 56 (L) >60 ml/min/1.73m2 GFR est non-AA 46 (L) >60 ml/min/1.73m2 Calcium 8.3 (L) 8.5 - 10.1 MG/DL Imaging Reviewed:  
 
Mra Brain Wo Cont Result Date: 5/6/2019 IMPRESSION:  1. No flow is demonstrated within the right vertebral artery and local stenosis flow is noted within the left vertebral artery. No flow is demonstrated within the basilar artery. 2. 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 Fusiform dilatation of the left cavernous carotid 4. Marked narrowing of the right cavernous carotid with fusiform dilatation of the distal right internal carotid artery 5. Multifocal areas of stenosis in the middle cerebral arteries bilaterally Mri Brain Wo Cont Result Date: 5/6/2019 IMPRESSION: 1. Large acute infarct of the inferior left cerebellum 2. Flow voids within the vertebral arteries are diminished to absent 3. Possible 12 mm aneurysm off the distal internal carotid artery Xr Chest [facility] Result Date: 5/5/2019 IMPRESSION: No acute findings. Ct Code Neuro Head Wo Contrast 
 
Result Date: 5/5/2019 IMPRESSION: Chronic infarctions. No acute findings. Cta Head Neck W Cont Result Date: 5/9/2019 Impression: There is occlusion of the left posterior inferior cerebellar artery just distal to the origin. The left vertebral artery is patent with moderate cervical and severe intracranial atherosclerosis. The basilar artery demonstrates very minimal flow and is diminutive in caliber throughout. The distal right vertebral artery is occluded which may be chronic. Minimal to no flow in the right posterior cerebral artery may also be chronic. Moderate to severe intracranial atherosclerosis as described above. Questionable 2 to 3 mm aneurysm at the junction of the left P1 segment and left P-comm 4 mm infundibulum versus aneurysm at the origin of the right posterior communicating artery Left cavernous internal carotid artery is patent with focal ectasia/fusiform aneurysmal dilatation measuring 7 mm. There is a wide necked left carotid terminus / P-comm aneurysm measuring 12 x 7 mm Please refer to above findings for complete details Ethelene Gauss Assessment:  
 
Active Problems: 
  TIA (transient ischemic attack) (5/5/2019) Thrombotic stroke involving right middle cerebral artery ([address] (5/6/2019) Bilateral carotid artery stenosis (5/6/2019) Left homonymous hemianopsia due to recent cerebral infarction (5/6/2019) Parkinson's disease (tremor, stiffness, slow motion, unstable posture) ([address] (5/6/2019) Late onset Alzheimer's disease without behavioral disturbance (5/6/2019) Stroke (cerebrum) ([address] (5/6/2019) Cerebral aneurysm without rupture, left PCA 12 mm aneurysm (5/8/2019) Plan:  
 
The objective of todays visit was to review the transitional care plan with the patient. We discussed the importance of transitional care as it relates to reducing the likelihood of readmission. We reviewed the goals for the first 30 days following hospital discharge.  The patient and I discussed wrap around services including nurse navigation, care coordination, home health, transitional care appointments with their primary care provider and specialist team and the role of dispatch health. Patient education focused on readmission zones as described as The Red Zone: High risk for readmission, days 1-21 The Yellow Zone: Moderate risk for readmission, days 22-29 The Green Zone: Lower risk for readmission, days 30 and after 1. ACP needs to be assessed. No family or SO present, will defer till others are present for conversation. 2. HUG Tool Education for stroke given to patient . Long discussion on secondary prevention and the improtance of EMS activation with symptoms. 3. Hug Calendar distributed. 3. All labs, I/O's and imaging have been reviewed. 4. Medication Reconciliation  performed at discharge. Accessiblity no RX rationale explained yes Compliancy yes 5. Collaborated with  Robyn Martin MD on this plan of care. The patient  expressed understanding of the disease process and management plan, and all questions were answered. Greater than 45 minutes were spent in patient management, greater than half of which was spent in counseling and coordination of care. Signed By: Melissa Torres NP May 10, 2019

## 2019-05-10 NOTE — PROGRESS NOTES
Problem: Self Care Deficits Care Plan (Adult) Goal: *Acute Goals and Plan of Care (Insert Text) Description Occupational Therapy Goals: 
Initiated 5/6/2019 1. Patient will perform grooming standing with supervision/set-up within 7 days. 2. Patient will perform toileting with supervision/set-up within 7 days. 3. Patient will perform lower body dressing with minimal assistance within 7 days. 4. Patient perform bathing standing with supervision/set up within 7 days. 5. Patient will transfer from toilet with supervision using the least restrictive device and appropriate durable medical equipment within 7 days. Outcome: Progressing Towards Goal 
 OCCUPATIONAL THERAPY TREATMENT Patient: Carolina Dodd (31 y.o. male) Date: 5/10/2019 Diagnosis: TIA (transient ischemic attack) [G45.9] Stroke (cerebrum) (HCC) [I63.9] <principal problem not specified> Precautions: Bed Alarm Chart, occupational therapy assessment, plan of care, and goals were reviewed. ASSESSMENT: 
Pt was supine upon arrival and had very flat affect and very little verbalizations today. Pt was soiled of urine upon arrival and pt was aware. Min assist for supine to sit  with moderate assist for sit to stand with verbal cues for technique. Max assist x2 to mobilize to bathroom with RW and heavy left lean and pt was pushing to the left. Bedside commode pulled up behind pt and pt needed support of left bedside commode arm to remain upright. Multimodal cues needed for initiation of washing all parts but able to perform. Moderate assist to change hospital gown and total assist for depends management. Able to perform lateral weight shift on BSC to wash jame areas with min assist.  
 
Progression toward goals: 
?       Improving appropriately and progressing toward goals ? Improving slowly and progressing toward goals ? Not making progress toward goals and plan of care will be adjusted PLAN: 
 Patient continues to benefit from skilled intervention to address the above impairments. Continue treatment per established plan of care. Discharge Recommendations:  Inpatient Rehab Further Equipment Recommendations for Discharge:  defer to rehab SUBJECTIVE:  
Patient stated ? I need to get cleaned up.? OBJECTIVE DATA SUMMARY:  
Cognitive/Behavioral Status: 
Neurologic State: Alert Orientation Level: Oriented X4(periods of confustion) Cognition: Follows commands Perception: Cues to maintain midline in sitting;Cues to maintain midline in standing; Tactile;Verbal;Visual;Cues to attend to left side of body Perseveration: No perseveration noted Safety/Judgement: Decreased awareness of need for safety;Decreased awareness of need for assistance; Fall prevention Functional Mobility and Transfers for ADLs: 
Bed Mobility: 
Supine to Sit: Minimum assistance Scooting: Minimum assistance Transfers: 
Sit to Stand: Additional time;Assist x2; Moderate assistance Functional Transfers Bathroom Mobility: Maximum assistance(x2 with RW, heavy lean) Toilet Transfer : Maximum assistance;Assist x2; Additional time Bed to Chair: Maximum assistance; Additional time;Assist x2(heavy lean to left) Balance: 
Sitting - Static: Fair (occasional) Sitting - Dynamic: Poor (constant support)(heavy left lean) Standing: Impaired Standing - Static: Constant support;Poor Standing - Dynamic : (none heavy pushing to left in standing) ADL Intervention: 
  
 
  
 
Upper Body Bathing Bathing Assistance: Stand-by assistance(tactile and multimodal cues for initation) Position Performed: (seated on bedside commode, left lateral lean) Cues: Physical assistance; Tactile cues provided;Visual cues provided;Verbal cues provided Lower Body Bathing Perineal  : Minimum assistance(balance seated BSC with need lean on left BSC arm for balanc) Lower Body : Maximum assistance Upper Body Dressing Assistance Hospital Gown: Moderate assistance Lower Body Dressing Assistance Protective [place]: Total assistance (dependent) Toileting Bladder Hygiene: Minimum assistance(balance leaning to left and needs BSC rail to remain upright) Bowel Hygiene: Minimum assistance(balance seated with lateral  weight shift on commode) Clothing Management: Total assistance (dependent) Cognitive Retraining Problem Solving: (multi modal cues for initation of bathing ) Safety/Judgement: Decreased awareness of need for safety;Decreased awareness of need for assistance; Fall prevention Neuro Re-Education: 
Manual facilitation prevent heavy left lateral lean 
Pain: 
  
  
  
  
  
  
Activity Tolerance:  
Please refer to the flowsheet for vital signs taken during this treatment. After treatment:  
? Patient left in no apparent distress sitting up in recliner achair ? Patient left in no apparent distress in bed 
? Call bell left within reach ? Nursing notified ? Caregiver present ? Bed alarm activated COMMUNICATION/COLLABORATION:  
The patient?s plan of care was discussed with: Physical Therapist, Registered Nurse and patient and his family DUNIA Altman/L Time Calculation: 39 mins

## 2019-05-10 NOTE — DISCHARGE SUMMARY
Hospitalist Discharge Note NAME: Brandon Awad :  1940 MRN:  [id number] Admit date: 2019 Discharge date: 05/10/19 PCP: Radha Hodge NP Discharge Diagnoses: 
 
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 
  
Code Status: Full 
  
 
Discharge Medications: 
 
Current Outpatient Medications Medication Sig  carbidopa-levodopa (SINEMET)  mg per tablet Take 1 Tab by mouth three (3) times daily.  aspirin-dipyridamole (AGGRENOX)  mg per SR capsule Take 1 Cap by mouth two (2) times a day.  [START ON 2019] metoprolol succinate (TOPROL-XL) 25 mg XL tablet Take 1 Tab by mouth daily.  pravastatin (PRAVACHOL) 80 mg tablet Take 1 Tab by mouth daily.  polyethylene glycol (MIRALAX) 17 gram/dose powder Take 17 g by mouth daily as needed (constipation).  calcium-cholecalciferol, D3, (CALTRATE 600+D) tablet Take 1 Tab by mouth daily.  raNITIdine (ZANTAC) 150 mg tablet Take 150 mg by mouth daily.  amLODIPine (NORVASC) 10 mg tablet Take 10 mg by mouth daily.  aspirin 81 mg chewable tablet Take 81 mg by mouth daily.  citalopram (CELEXA) 10 mg tablet Take 10 mg by mouth every evening. Follow-up Information Follow up With Specialties Details Why Contact Info Radha Hodge NP Family Practice Schedule an appointment as soon as possible for a visit in 2 weeks Please call to schedule your hospital follow up [address] [phone]  Kaye Stone MD Neurology Go on 2019 Please follow up on  2019 at 9:40 arriving 15 minutes early to register [address] 
[phone] Time spent on discharge:   I spent greater than 30 minutes on discharge, seeing and examining the patient, reconciling home meds and new meds, coordinating care with case management, doing the discharge papers and the D/C summary Discharge disposition: SNF Discharge Condition: Stable Summary of admission H+P(copied from Dr Lia Allen Note): CHIEF COMPLAINT: slow speech, gen weakness 
  
HISTORY OF PRESENT ILLNESS:    
Rayna Alejandre is a 69 y.o. male, PMHx of prior CVA, CAD, HTN, Parkinson's who presents to the ED with slower speech and acute worsening of b/l LE weakness starting this AM when the patient tried to get out of bed. Wife present and states speech remains different than his baseline with slower stuttering speech. Pt was unable to stand without assistance when attempting to get out of bed. Per his wife, he normally is able to walk short distances in the house without assistance and uses a cane outside of the home at [facility]. Pt denies palpitations, near-syncope, visual changes, or changes in sensation. 
  
We were asked to admit for work up and evaluation of the above problems.  
  
    
Past Medical History:  
Diagnosis Date  CAD (coronary artery disease)    
 Hypertension    
 Other ill-defined conditions(799.89)    
  parkinson's  Other ill-defined conditions(799.89)    
  high cholesterol  Stroke [facility]) 2006  
  left sided weakness  
  
  
     
Past Surgical History:  
Procedure Laterality Date  HX CATARACT REMOVAL Bilateral    
 HX HEART CATHETERIZATION   2006  CT COLONOSCOPY FLX DX W/COLLJ SPEC WHEN PFRMD   5/12/2014  
     
  
Admit pCXR read by radiology FINDINGS:  
A portable AP radiograph of the chest was obtained at 1537 hours. Trace right pleural effusion is again shown. Chronic pleural-parenchymal changes are again noted along the minor fissure and right pulmonary base, unchanged. There is no consolidation or pulmonary edema. No pneumothorax is shown. Cardiac 
and mediastinal contours appear stable. IMPRESSION: No acute findings. Carotid doppler read by neurology results Right Carotid The right CCA is patent. There is mild stenosis in the right ICA (<50%). The right ICA has mild and calcific plaque. The right ECA is patent. The right vertebral is antegrade. The vertebral artery Doppler signal is resistant suggestive of distal occlusive disease. The right subclavian is normal.  
 
Left Carotid The left CCA is patent. There is mild stenosis in the left ICA (<50%). The left ICA has mild and calcific plaque. The left ECA is patent. The left vertebral is antegrade. The vertebral artery Doppler signal is resistant suggestive of distal occlusive disease. The left subclavian is normal.  
 
MRI brain read by radiology FINDINGS: 
There is ex vacuo dilatation of the right lateral ventricle. No hydrocephalus 
there is an acute infarct of the left inferior cerebellum. Chronic 
encephalomalacia of the right parietal, occipital and posterior temporal lobes. No acute bleed or midline shift. There is no cerebellar tonsillar herniation. No 
flow-voids are demonstrated within the distal vertebral arteries. The basilar is 
diminutive in size. Question 12 mm aneurysm off the distal left internal carotid artery The paranasal sinuses, mastoid air cells, and middle ears are clear. The orbital 
contents are within normal limits. No significant osseous or scalp lesions are 
identified. IMPRESSION:  
1. Large acute infarct of the inferior left cerebellum 2. Flow voids within the vertebral arteries are diminished to absent 3. Possible 12 mm aneurysm off the distal internal carotid artery Echo TTE read by cardiology results: 
Left Ventricle Normal cavity size. Mild concentric hypertrophy.  Low normal systolic function. The estimated ejection fraction is 51 - 55%. No regional wall motion abnormality noted. There is age-appropriate left ventricular diastolic function. Left Atrium Normal cavity size. Right Ventricle Normal cavity size and global systolic function. Right Atrium Normal cavity size. Aortic Valve Trileaflet valve structure and no stenosis. There is mild leaflet calcification without reduced excursion. Trace aortic valve regurgitation. Mitral Valve Normal valve structure and no stenosis. Trace regurgitation. Tricuspid Valve Normal valve structure, no stenosis and no regurgitation. Pulmonary arterial systolic pressure is 61.2 mmHg. There is no evidence of pulmonary hypertension. Pulmonic Valve Pulmonic valve not well visualized, but normal doppler findings. No stenosis and no regurgitation. Aorta The aorta was not well visualized. Normal aortic root, ascending aortic, and aortic arch. IVC/Hepatic Veins Inferior vena cava not assessed. Inferior vena cava not well visualized. Pericardium Insignificant pericardial effusion or fat. CTA head and neck read by radiology 5/9/2019 FINDINGS: 
Head CT: 
Large acute left cerebellar infarction is again seen. Chronic appearing right 
cerebellar and right parietal occipital infarctions with encephalomalacia are 
again noted. Mild to moderate bilateral subcortical and periventricular areas of 
patchy low attenuation is nonspecific but likely related to changes of chronic 
small vessel ischemic disease. No midline shift. Basal cisterns are patent. Exvacuodilatation of the right posterior horn of the lateral ventricle is 
unchanged.  Paranasal sinuses are well aerated. 
  
CTA NECK: 
Great vessels: Patent 
  
Right subclavian artery: Patent 
  
Left subclavian artery: Patent 
  
Right common carotid artery: Patent 
  
Left common carotid artery: Patent 
  
Cervical right internal carotid artery: Patent with proximal atherosclerosis and 
 less than 50% stenosis by NASCET criteria. 
  
Cervical left internal carotid artery: Patent with no significant stenosis by NASCET criteria. 
  
Right vertebral artery: The V1 through distal V2 segments are patent with 
moderate atherosclerosis. Minimal to no flow is noted in the V3 and proximal 
intracranial V4 segment 
  
Left vertebral artery: Patent with diffuse moderate atherosclerosis 
  
Moderate cervical spondylosis 
  
CTA HEAD: 
Right cavernous internal carotid artery: Patent with moderate atherosclerosis Left cavernous internal carotid artery: Patent with focal ectasia/fusiform 
aneurysmal dilatation measuring 7 mm (series 5, image 378). There is a wide 
necked carotid terminus / P-comm aneurysm measuring 12 x 7 mm (series 399). Anterior cerebral arteries: Patent with moderate atherosclerosis and focal 
ectasia of the proximal right A2 segment 
  
Anterior communicating artery: Patent 
  
Right middle cerebral artery: Patent with moderate to severe atherosclerosis 
  
Left middle cerebral artery: Patent with moderate to severe atherosclerosis 
  
Posterior communicating arteries: Right is patent with 4 mm infundibulum versus 
aneurysm at its origin. Left is patent and mildly ectatic 
  
Posterior cerebral arteries: Minimal to no flow is noted in the right posterior 
cerebral artery which may be chronically occluded. The left posterior cerebral 
arteries patent with moderate atherosclerosis. Questionable 2 to 3 mm aneurysm 
at the junction of the left P1 segment and left P-comm 
  
Basilar artery: Very minimal flow is appreciated in the basilar artery which is 
diminutive in caliber.  
  
Distal vertebral arteries: Proximal distal right vertebral artery is occluded 
with minimal reconstitution just proximal to the basilar artery. The left V4 
segment vertebral artery is patent but diminutive in caliber and severely 
atherosclerotic.  There is occlusion of the left PICA just distal to the origin. 
  
 IMPRESSION Occlusion of the left posterior inferior cerebellar artery just distal 
to the origin. The left vertebral artery is patent with moderate cervical and 
severe intracranial atherosclerosis. The basilar artery demonstrates very 
minimal flow and is diminutive in caliber throughout. The distal right vertebral 
artery is occluded which may be chronic. Minimal to no flow in the right 
posterior cerebral artery may also be chronic. Moderate to severe intracranial atherosclerosis as described above. 
  
Questionable 2 to 3 mm aneurysm at the junction of the left P1 segment and left P-comm 
  
4 mm infundibulum versus aneurysm at the origin of the right posterior 
communicating artery 
  
Left cavernous internal carotid artery is patent with focal ectasia/fusiform 
aneurysmal dilatation measuring 7 mm. There is a wide necked left carotid 
terminus / P-comm aneurysm measuring 12 x 7 mm Hospital course:  
 
Acute large left cerebellar CVA Right vertebral and basilar occlusion POA Parkinson's disease with dementia POA Left Posterior communicating artery aneurysm POA Prior large R MCA CVA with residual Left sided weakness Prior L cerebellar CVA POA 
+blurred vision, difficulty walking with bilateral leg weakness and slower speech CT head with chronic infarctions. No acute findings. MRI brain with large acute infarct of the inferior left cerebellum MRA brain with no flow demonstrated within the right vertebral artery and local stenosis flow is noted within the left vertebral artery. No flow is demonstrated within 
the basilar artery. 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 Fusiform dilatation of the left cavernous carotid. Marked narrowing of the right cavernous carotid with fusiform dilatation of the distal right internal carotid artery. Multifocal areas of stenosis in the middle cerebral arteries bilaterally. CTA head and neck 5/9/2019 Occlusion of the left posterior inferior cerebellar artery just distal to origin. Left vertebral artery is patent with moderate cervical and severe intracranial  
             atherosclerosis. Basilar artery demonstrates very minimal flow and is diminutive in caliber throughout. The distal right vertebral artery is occluded which may be chronic Minimal to no flow in the right posterior cerebral artery may also be chronic. 
    Moderate to severe intracranial atherosclerosis as described above. 
  
   Questionable 2 to 3 mm aneurysm at the junction of the left P1 segment  
            and left P-comm 
   4 mm infundibulum versus aneurysm at the origin of the right posterior Communicating artery 
    Left cavernous internal carotid artery is patent with focal ectasia/fusiform 
       aneurysmal dilatation measuring 7 mm. Wide necked left carotid terminus / P-comm aneurysm measuring 12 x 7 mm Tele NSR with PVCs, no clear atrial fib Echo LVEF 51-55%, normal RV size ,  pravachol increased Change aggrenox to BID per neuro recs, continue ASA Neuro consult appreciated, sinemet dose reduced per their recs Neurology with refer to neurointerventional radiology as outpatient Has extensive intracranial atherosclerosis D/C to rehab Essential HTN and CAD POA normotensive Norvasc Metoprolol at decreased dose with low HR 
BP stable Hyperglycemia: no h/o DM HgA1c 5.9 Presumed CKD stage 3 POA Dementia with anxiety, depression: con't celexa 
  
Code Status: Full Surrogate Decision Maker: wife  
 
DVT Prophylaxis: heparin 
  
Baseline: ambulates with cane, lives at home with wife and duaghter Subjective: Chief Complaint / Reason for Physician Visit \"No problems\". Discussed with RN events overnight. No new symptoms Review of Systems: 
Symptom Y/N Comments  Symptom Y/N Comments Fever/Chills n   Chest Pain n   
Poor Appetite    Edema n Cough n   Abdominal Pain n   
Sputum    Joint Pain SOB/MARTINEZ n   Pruritis/Rash Nausea/vomit n   Tolerating PT/OT Diarrhea    Tolerating Diet y Constipation    Other Could NOT obtain due to:   
 
Objective: VITALS:  
Last 24hrs VS reviewed since prior progress note. Most recent are: 
Patient Vitals for the past 24 hrs: 
 Temp Pulse Resp BP SpO2  
05/10/19 1533 97.7 °F (36.5 °C) 61 18 142/80 99 % 05/10/19 1153 98.2 °F (36.8 °C) 61 18 142/81 97 % 05/10/19 0757 98.3 °F (36.8 °C) 66 18 156/85 97 % 05/10/19 0409 98.6 °F (37 °C) 63 18 139/70 97 % 05/09/19 2219 98.7 °F (37.1 °C) 69 18 160/68 97 % 05/09/19 1942 98.3 °F (36.8 °C) 67 20 125/51 97 % Intake/Output Summary (Last 24 hours) at 5/10/2019 1809 Last data filed at 5/9/2019 2043 Gross per 24 hour Intake  Output 275 ml Net -275 ml PHYSICAL EXAM: 
General: WD, WN. Alert, cooperative, no acute distress   
EENT:  EOMI. Anicteric sclerae. MMM Resp:  CTA bilaterally, no wheezing or rales. No accessory muscle use CV:  Regular  rhythm,  No edema GI:  Soft, Non distended, Non tender.  +Bowel sounds Neurologic:  Alert and oriented X 3 when I saw normal speech, LLE weakness Psych:   Some insight. Not anxious nor agitated Skin:  No rashes. No jaundice Reviewed most current lab test results and cultures  YES Reviewed most current radiology test results   YES Review and summation of old records today    NO Reviewed patient's current orders and MAR    YES 
PMH/SH reviewed - no change compared to H&P 
________________________________________________________________________ Care Plan discussed with: 
  Comments Patient x Family RN x Care Manager x Consultant  x Dr Myrna Hamm Multidiciplinary team rounds were held today with , nursing, pharmacist and clinical coordinator.   Patient's plan of care was discussed; medications were reviewed and discharge planning was addressed. ________________________________________________________________________ Comments >50% of visit spent in counseling and coordination of care x   
________________________________________________________________________ Katie Guillaume MD  
 
Procedures: see electronic medical records for all procedures/Xrays and details which were not copied into this note but were reviewed prior to creation of Plan. LABS: 
I reviewed today's most current labs and imaging studies. Pertinent labs include: 
Recent Labs 05/08/19 
1021 WBC 4.9 HGB 13.6 HCT 41.0  
 Recent Labs 05/10/19 
0423 05/09/19 
0450 05/08/19 
1021  138 140  
K 3.8 4.1 3.7 * 108 109* CO2 24 21 24 GLU 94 122* 146* BUN 14 15 16 CREA 1.47* 1.66* 1.78* CA 8.3* 8.5 8.5 ALB  --   --  3.5 TBILI  --   --  0.6 SGOT  --   --  17 ALT  --   --  6* Signed: Katie Guillaume MD

## 2019-05-10 NOTE — PROGRESS NOTES
Consult REFERRED BY: 
Shaniqua Castellanos NP 
 
CHIEF COMPLAINT: Sudden blurred vision and bilateral leg weakness Subjective:  
 
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 He also has severe intracranial disease, with occluded distal right vertebral artery, severely stenotic left vertebral artery, and severely stenotic and narrowed basilar artery and bilateral posterior cerebral arteries also severely diseased with probable occlusion on the left and severe stenosis on the right. Patient can now concentrate on therapy, as we have clearly defined his vascular problems. We will need to talk to the patient and the family about how aggressive they want to be. Rosa Avilez is a 69 y.o. right-handed  male we are seeing as a new patient, for evaluation at the request of Dr. Manju Valenzuela of a new problem of sudden blurred vision and difficulty walking with bilateral leg weakness and slower speech we try to get out of bed yesterday morning. This apparently lasted several hours. His CT scan on admission just shows chronic infarctions, no acute findings. 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 Discussed with the patient hospice and I think we should get a CTA to define all of these lesions completely and to see whether or not he needs interventional neuroradiology for his aneurysm. We will hydrate him today and get CTA in the morning.   If he really has a 12 mm cerebral aneurysm, coiling the aneurysm is normally recommended if the aneurysm is bigger than 8 mm, but he is a difficult case. 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 He had a previous large right middle cerebral artery infarct from a stroke that occurred about 8 to 10 years ago and has had a residual left-sided weakness ever since. He also has a small area of ischemia in the left cerebellar hemisphere. He has diffuse white matter disease scattered throughout the brain. Patient also has history of Parkinson's disease is currently on Sinemet 50/200 mg once a day in the morning. Patient was currently on Aggrenox once a day and a baby aspirin once a day. He had not had any recurrent strokes since his last one 8 to 10 years ago. He is never seen a neurologist for his Parkinson's disease. He also appears to have a mild dementia because he is currently on Aricept 10 mg a day. He denies any unusual chest pain, palpitations, or other cardiac symptoms associated with a stroke. He said he is feeling much better now and his legs feel back to normal. 
He denies any major increase in back pain, neck pain, or bowel or bladder difficulty associated with this episode. He denies any unusual headache, double vision, falls, fever, meningismus, new medications, or cardiac symptoms as above. Past Medical History:  
Diagnosis Date  CAD (coronary artery disease)  Hypertension  Other ill-defined conditions(799.89)   
 parkinson's  Other ill-defined conditions(799.89)   
 high cholesterol  Stroke [facility]) 2006  
 left sided weakness Past Surgical History:  
Procedure Laterality Date  HX CATARACT REMOVAL Bilateral   
 HX HEART CATHETERIZATION  2006  [facility] COLONOSCOPY FLX DX W/COLLJ SPEC WHEN PFRMD  5/12/2014 History reviewed. No pertinent family history. Social History Tobacco Use  Smoking status: Former Smoker  Smokeless tobacco: Never Used Substance Use Topics  Alcohol use: No  
   
 
Current Facility-Administered Medications:  
  0.9% sodium chloride infusion, 75 mL/hr, IntraVENous, CONTINUOUS, Ciesla, Juanita Severance, MD, Last Rate: 75 mL/hr at 05/09/19 2025, 75 mL/hr at 05/09/19 2025   sodium chloride (NS) flush 5-40 mL, 5-40 mL, IntraVENous, Q8H, Murphy Villalta, DO, 10 mL at 05/09/19 1506   sodium chloride (NS) flush 5-40 mL, 5-40 mL, IntraVENous, PRN, Murphy Villalta, DO 
  acetaminophen (TYLENOL) tablet 650 mg, 650 mg, Oral, Q4H PRN **OR** acetaminophen (TYLENOL) solution 650 mg, 650 mg, Per NG tube, Q4H PRN **OR** acetaminophen (TYLENOL) suppository 650 mg, 650 mg, Rectal, Q4H PRN, Murphy Villalta, DO 
  heparin (porcine) injection 5,000 Units, 5,000 Units, SubCUTAneous, Q8H, [place], DO, 5,000 Units at 05/09/19 2040   citalopram (CELEXA) tablet 10 mg, 10 mg, Oral, QPM, Murphy Villalta, DO, 10 mg at 05/09/19 1816   polyethylene glycol (MIRALAX) packet 17 g, 17 g, Oral, DAILY, Murphy Villalta, DO, 17 g at 05/09/19 6952   amLODIPine (NORVASC) tablet 10 mg, 10 mg, Oral, DAILY, Murphy Villalta, DO, 10 mg at 05/09/19 6916   ondansetron (ZOFRAN) injection 4 mg, 4 mg, IntraVENous, Q6H PRN, Jen Rousseau MD, 4 mg at 05/09/19 5701   pravastatin (PRAVACHOL) tablet 80 mg, 80 mg, Oral, DAILY, Jen Rousseau MD, 80 mg at 05/09/19 8342   aspirin-dipyridamole (AGGRENOX)  mg per capsule 1 Cap, 1 Cap, Oral, BIDPC, Elaine Castro MD, 1 Cap at 05/09/19 1816   carbidopa-levodopa (SINEMET)  mg per tablet 1 Tab, 1 Tab, Oral, TID, Elaine Castro MD, 1 Tab at 05/09/19 5796   famotidine (PEPCID) tablet 20 mg, 20 mg, Oral, DAILY, Jen Rousseau MD, 20 mg at 05/09/19 8071   metoprolol succinate (TOPROL-XL) XL tablet 25 mg, 25 mg, Oral, DAILY, Segundo Booth MD, 25 mg at 05/09/19 6392 No Known Allergies MRI Results (most recent): 
Results from Hospital Encounter encounter on 05/05/19 MRA BRAIN WO CONT Narrative INDICATION:   CVA COMPARISON:  None TECHNIQUE:   
3-D time-of-flight MRA of the brain was performed. Multiplanar reconstructions 
were obtained. FINDINGS: 
There is no flow demonstrated within the distal right vertebral artery and 
minimal to no flow within the distal left vertebral artery. No flow is 
demonstrated within the basilar arteries. . While the internal carotid arteries 
are patent. There is fusiform enlargement of the left cavernous carotid 
measuring 6 mm in diameter. There is an 11 mm slightly irregular left posterior 
communicating artery aneurysm. There is irregular narrowing of the left middle 
cerebral artery with fusiform enlargement at its trifurcation there is narrowing 
of the right cavernous carotid with fusiform enlargement of the right carotid 
terminus measuring a maximum of 8 mm. There is irregular narrowing of the right 
middle cerebral artery. Anterior cerebral arteries are patent. There are 
bilateral posterior communicating arteries Impression IMPRESSION:   
1. No flow is demonstrated within the right vertebral artery and local stenosis 
flow is noted within the left vertebral artery. No flow is demonstrated within 
the basilar artery. 2. 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 Fusiform dilatation of the left cavernous carotid 4. Marked narrowing of the right cavernous carotid with fusiform dilatation of 
the distal right internal carotid artery 5. Multifocal areas of stenosis in the middle cerebral arteries bilaterally Results from Hospital Encounter encounter on 05/05/19 MRA BRAIN WO CONT Narrative INDICATION:   CVA COMPARISON:  None TECHNIQUE:   
 3-D time-of-flight MRA of the brain was performed. Multiplanar reconstructions 
were obtained. FINDINGS: 
There is no flow demonstrated within the distal right vertebral artery and 
minimal to no flow within the distal left vertebral artery. No flow is 
demonstrated within the basilar arteries. . While the internal carotid arteries 
are patent. There is fusiform enlargement of the left cavernous carotid 
measuring 6 mm in diameter. There is an 11 mm slightly irregular left posterior 
communicating artery aneurysm. There is irregular narrowing of the left middle 
cerebral artery with fusiform enlargement at its trifurcation there is narrowing 
of the right cavernous carotid with fusiform enlargement of the right carotid 
terminus measuring a maximum of 8 mm. There is irregular narrowing of the right 
middle cerebral artery. Anterior cerebral arteries are patent. There are 
bilateral posterior communicating arteries Impression IMPRESSION:   
1. No flow is demonstrated within the right vertebral artery and local stenosis 
flow is noted within the left vertebral artery. No flow is demonstrated within 
the basilar artery. 2. 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 Fusiform dilatation of the left cavernous carotid 4. Marked narrowing of the right cavernous carotid with fusiform dilatation of 
the distal right internal carotid artery 5. Multifocal areas of stenosis in the middle cerebral arteries bilaterally Review of Systems: A comprehensive review of systems was negative except for: Constitutional: positive for fatigue and malaise Eyes: positive for visual disturbance Musculoskeletal: positive for myalgias, arthralgias and stiff joints Neurological: positive for memory problems, coordination problems and gait problems Behvioral/Psych: positive for Memory loss Vitals:  
 05/09/19 0738 05/09/19 1205 05/09/19 1513 05/09/19 1942 BP: 148/82 142/82 149/77 125/51 Pulse: 72 77 72 67 Resp: 18 16 18 20 Temp: 98.2 °F (36.8 °C) 97.5 °F (36.4 °C) 98.1 °F (36.7 °C) 98.3 °F (36.8 °C) SpO2: 99% 100% 99% 97% Weight:      
Height:      
 
Objective: I 
 
 
NEUROLOGICAL EXAM: 
 
Appearance: The patient is well developed, well nourished, provides a fair history and is in no acute distress. Mental Status: Oriented to time, place and person, and the president, cognitive function is low and probably mildly abnormal and speech is fluent and no aphasia or dysarthria. Mood and affect appropriate. Cranial Nerves:   LHH on visual fields. Fundi are benign, disc are flat, no lesions seen on funduscopy. NILE, EOM's full, no nystagmus, no ptosis. Facial sensation is normal. Corneal reflexes are not tested. Facial movement is asymmetric on the left. Hearing is abnormal bilaterally. Palate is midline with normal sternocleidomastoid and trapezius muscles are normal. Tongue is midline. Neck without meningismus or bruits Temporal arteries are not tender or enlarged TMJ areas are not tender on palpation Motor:  4/5 strength in upper and lower proximal and distal muscles, but patient has a mild left-sided weakness and slight arm drift. Normal bulk and increased tone bilaterally, right side greater than left with cogwheel rigidity more on the right side as compared to the left. . No fasciculations. Rapid alternating movement is normal on the right, but decreased on the left. Reflexes:   Deep tendon reflexes 2+/4 on the left, and 1+/4 on the right No babinski or clonus present Sensory:   Normal to touch, pinprick and vibration and temperature decreased in both feet. DSS is intact Gait:  Not tested gait since patient has restraints on. Tremor:   No tremor noted. Cerebellar:   Left sided dysmetria abnormal cerebellar signs present on finger-nose-finger exam.  Romberg and tandem could not be tested Neurovascular:  Normal heart sounds and regular rhythm, peripheral pulses decreased, and no carotid bruits. Assessment: ICD-10-CM ICD-9-CM 1. Acute ischemic stroke (HCC) I63.9 434.91   
2. Bilateral carotid artery stenosis I65.23 433.10   
  433.30 3. Late onset Alzheimer's disease without behavioral disturbance G30.1 331.0 F02.80 294.10 4. Left homonymous hemianopsia due to recent cerebral infarction I69.398 438.7 H53.462 368.46   
5. Parkinson's disease (tremor, stiffness, slow motion, unstable posture) ([address] G20 332.0 6. Thrombotic stroke involving right middle cerebral artery ([facility] [address] I63.311 434.01   
7. TIA (transient ischemic attack) G45.9 435.9 Active Problems: 
  TIA (transient ischemic attack) (5/5/2019) Thrombotic stroke involving right middle cerebral artery ([address] (5/6/2019) Bilateral carotid artery stenosis (5/6/2019) Left homonymous hemianopsia due to recent cerebral infarction (5/6/2019) Parkinson's disease (tremor, stiffness, slow motion, unstable posture) ([address] (5/6/2019) Late onset Alzheimer's disease without behavioral disturbance (5/6/2019) Stroke (cerebrum) ([address] (5/6/2019) Cerebral aneurysm without rupture, left PCA 12 mm aneurysm (5/8/2019) Plan:  
 
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 He also has severe intracranial disease, with occluded distal right vertebral artery, severely stenotic left vertebral artery, and severely stenotic and narrowed basilar artery and bilateral posterior cerebral arteries also severely diseased with probable occlusion on the left and severe stenosis on the right. Patient can now concentrate on therapy, as we have clearly defined his vascular problems. We will need to talk to the patient and the family about how aggressive they want to be. Patient with new left cerebellar infarct, and bilateral vertebral artery disease and basilar artery disease and possible aneurysm of the posterior cerebral artery, so we will get CTA to define all these lesions and then see whether or not he needs intervention Patient appears to have a mild left hemiparesis and left homonymous hemianopsia, and the question is this is new or old, and he needs an MRI scan. I reviewed his MRI and MRA personally on the PACS system myself and I agree with reports as above his need for CTA. Have discussed with the hospitalist we will hydrate him today and then do CTA We will increase his Aggrenox to twice a day, and continue his Sinemet but increase the dose to 3 times a day of the lower version. Difficult case, discussed with the patient and the family in detail, need to make sure there is no cardioembolic source, or other neurologic cause for his symptoms. Patient obviously a high risk for further neurological decompensation or worsening severe morbidity and possibly mortality if his disease of Alzheimer's disease, vascular disease is prominent, although untreated in addition to his Parkinson's disease Continue excellent medical care as you are, we will follow closely with you. Discussed with hospitalist and the family. Signed By: Jam Garcia MD   
 May 9, 2019 CC: Ros Smiley NP 
FAX: [phone]

## 2019-05-11 NOTE — PROGRESS NOTES
Consult REFERRED BY: 
Murali Hernandez NP 
 
CHIEF COMPLAINT: Sudden blurred vision and bilateral leg weakness Subjective:  
 
Patient remained stable, we discussed his CTA with the patient, and explained the plans for follow-up etc., he seems agreeable to plans as above. We can see the patient in follow-up in 2 to 4 weeks in the office, for him to interventional neuroradiology for evaluation and consideration for treatment for his aneurysms. 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 He also has severe intracranial disease, with occluded distal right vertebral artery, severely stenotic left vertebral artery, and severely stenotic and narrowed basilar artery and bilateral posterior cerebral arteries also severely diseased with probable occlusion on the left and severe stenosis on the right. Patient can now concentrate on therapy, as we have clearly defined his vascular problems. We will need to talk to the patient and the family about how aggressive they want to be. Kristan Etienne is a 69 y.o. right-handed  male we are seeing as a new patient, for evaluation at the request of Dr. Rahul Kuhn of a new problem of sudden blurred vision and difficulty walking with bilateral leg weakness and slower speech we try to get out of bed yesterday morning. This apparently lasted several hours. His CT scan on admission just shows chronic infarctions, no acute findings. 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   Discussed with the patient hospice and I think we should get a CTA to define all of these lesions completely and to see whether or not he needs interventional neuroradiology for his aneurysm. We will hydrate him today and get CTA in the morning. If he really has a 12 mm cerebral aneurysm, coiling the aneurysm is normally recommended if the aneurysm is bigger than 8 mm, but he is a difficult case. 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 He had a previous large right middle cerebral artery infarct from a stroke that occurred about 8 to 10 years ago and has had a residual left-sided weakness ever since. He also has a small area of ischemia in the left cerebellar hemisphere. He has diffuse white matter disease scattered throughout the brain. Patient also has history of Parkinson's disease is currently on Sinemet 50/200 mg once a day in the morning. Patient was currently on Aggrenox once a day and a baby aspirin once a day. He had not had any recurrent strokes since his last one 8 to 10 years ago. He is never seen a neurologist for his Parkinson's disease. He also appears to have a mild dementia because he is currently on Aricept 10 mg a day. He denies any unusual chest pain, palpitations, or other cardiac symptoms associated with a stroke. He said he is feeling much better now and his legs feel back to normal. 
He denies any major increase in back pain, neck pain, or bowel or bladder difficulty associated with this episode. He denies any unusual headache, double vision, falls, fever, meningismus, new medications, or cardiac symptoms as above. Past Medical History:  
Diagnosis Date  CAD (coronary artery disease)  Hypertension  Other ill-defined conditions(144.89)   
 parkinson's  Other ill-defined conditions(799.89)   
 high cholesterol  Stroke [facility]) 2006  
 left sided weakness Past Surgical History:  
Procedure Laterality Date  HX CATARACT REMOVAL Bilateral   
 HX HEART CATHETERIZATION  2006  NH COLONOSCOPY FLX DX W/COLLJ SPEC WHEN PFRMD  5/12/2014 History reviewed. No pertinent family history. Social History Tobacco Use  Smoking status: Former Smoker  Smokeless tobacco: Never Used Substance Use Topics  Alcohol use: No  
   
 
Current Facility-Administered Medications:  
  0.9% sodium chloride infusion, 75 mL/hr, IntraVENous, CONTINUOUS, Enid Newell MD, Last Rate: 75 mL/hr at 05/09/19 2025, 75 mL/hr at 05/09/19 2025   sodium chloride (NS) flush 5-40 mL, 5-40 mL, IntraVENous, Q8H, [place] M, DO, 10 mL at 05/09/19 2217   sodium chloride (NS) flush 5-40 mL, 5-40 mL, IntraVENous, PRN, [place] M, DO, 10 mL at 05/10/19 0414   acetaminophen (TYLENOL) tablet 650 mg, 650 mg, Oral, Q4H PRN **OR** acetaminophen (TYLENOL) solution 650 mg, 650 mg, Per NG tube, Q4H PRN **OR** acetaminophen (TYLENOL) suppository 650 mg, 650 mg, Rectal, Q4H PRN, [place], Marcopher M, DO 
  heparin (porcine) injection 5,000 Units, 5,000 Units, SubCUTAneous, Q8H, [place]phy M, DO, 5,000 Units at 05/10/19 1144   citalopram (CELEXA) tablet 10 mg, 10 mg, Oral, QPM, [place]oner M, DO, 10 mg at 05/10/19 1723   polyethylene glycol (MIRALAX) packet 17 g, 17 g, Oral, DAILY, [place], Jamesoner M, DO, 17 g at 05/10/19 9578   amLODIPine (NORVASC) tablet 10 mg, 10 mg, Oral, DAILY, [place], Marcopher M, DO, 10 mg at 05/10/19 8226   ondansetron (ZOFRAN) injection 4 mg, 4 mg, IntraVENous, Q6H PRN, Suzzane Spies, MD, 4 mg at 05/09/19 8135   pravastatin (PRAVACHOL) tablet 80 mg, 80 mg, Oral, DAILY, Sam Mckenzie MD, 80 mg at 05/10/19 2562   aspirin-dipyridamole (AGGRENOX)  mg per capsule 1 Cap, 1 Cap, Oral, BIDPC, Eliecer Meadows MD, 1 Cap at 05/10/19 7180   carbidopa-levodopa (SINEMET)  mg per tablet 1 Tab, 1 Tab, Oral, TID, Eliecer Meadows MD, 1 Tab at 05/10/19 1723   famotidine (PEPCID) tablet 20 mg, 20 mg, Oral, DAILY, Uvaldo Cheney MD, 20 mg at 05/10/19 3071   metoprolol succinate (TOPROL-XL) XL tablet 25 mg, 25 mg, Oral, DAILY, Uvaldo Cheney MD, 25 mg at 05/10/19 2194 Current Outpatient Medications:  
  carbidopa-levodopa (SINEMET)  mg per tablet, Take 1 Tab by mouth three (3) times daily. , Disp: 90 Tab, Rfl: 3 
  aspirin-dipyridamole (AGGRENOX)  mg per SR capsule, Take 1 Cap by mouth two (2) times a day., Disp: 60 Cap, Rfl: 3 
  [START ON 5/11/2019] metoprolol succinate (TOPROL-XL) 25 mg XL tablet, Take 1 Tab by mouth daily. , Disp: 30 Tab, Rfl: 6 
  pravastatin (PRAVACHOL) 80 mg tablet, Take 1 Tab by mouth daily. , Disp: 30 Tab, Rfl: 6 
  polyethylene glycol (MIRALAX) 17 gram/dose powder, Take 17 g by mouth daily as needed (constipation). , Disp: , Rfl:  
  calcium-cholecalciferol, D3, (CALTRATE 600+D) tablet, Take 1 Tab by mouth daily. , Disp: , Rfl:  
  raNITIdine (ZANTAC) 150 mg tablet, Take 150 mg by mouth daily. , Disp: , Rfl:  
  amLODIPine (NORVASC) 10 mg tablet, Take 10 mg by mouth daily. , Disp: , Rfl:  
  aspirin 81 mg chewable tablet, Take 81 mg by mouth daily. , Disp: , Rfl:  
  citalopram (CELEXA) 10 mg tablet, Take 10 mg by mouth every evening., Disp: , Rfl:  
 
 
 
No Known Allergies MRI Results (most recent): 
Results from Hospital Encounter encounter on 05/05/19 MRA BRAIN WO CONT Narrative INDICATION:   CVA COMPARISON:  None TECHNIQUE:   
3-D time-of-flight MRA of the brain was performed. Multiplanar reconstructions 
were obtained. FINDINGS: 
There is no flow demonstrated within the distal right vertebral artery and 
minimal to no flow within the distal left vertebral artery. No flow is demonstrated within the basilar arteries. . While the internal carotid arteries 
are patent. There is fusiform enlargement of the left cavernous carotid 
measuring 6 mm in diameter. There is an 11 mm slightly irregular left posterior 
communicating artery aneurysm. There is irregular narrowing of the left middle 
cerebral artery with fusiform enlargement at its trifurcation there is narrowing 
of the right cavernous carotid with fusiform enlargement of the right carotid 
terminus measuring a maximum of 8 mm. There is irregular narrowing of the right 
middle cerebral artery. Anterior cerebral arteries are patent. There are 
bilateral posterior communicating arteries Impression IMPRESSION:   
1. No flow is demonstrated within the right vertebral artery and local stenosis 
flow is noted within the left vertebral artery. No flow is demonstrated within 
the basilar artery. 2. 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 Fusiform dilatation of the left cavernous carotid 4. Marked narrowing of the right cavernous carotid with fusiform dilatation of 
the distal right internal carotid artery 5. Multifocal areas of stenosis in the middle cerebral arteries bilaterally Results from Hospital Encounter encounter on 05/05/19 MRA BRAIN WO CONT Narrative INDICATION:   CVA COMPARISON:  None TECHNIQUE:   
3-D time-of-flight MRA of the brain was performed. Multiplanar reconstructions 
were obtained. FINDINGS: 
There is no flow demonstrated within the distal right vertebral artery and 
minimal to no flow within the distal left vertebral artery. No flow is 
demonstrated within the basilar arteries. . While the internal carotid arteries 
are patent. There is fusiform enlargement of the left cavernous carotid 
measuring 6 mm in diameter. There is an 11 mm slightly irregular left posterior 
communicating artery aneurysm. There is irregular narrowing of the left middle cerebral artery with fusiform enlargement at its trifurcation there is narrowing 
of the right cavernous carotid with fusiform enlargement of the right carotid 
terminus measuring a maximum of 8 mm. There is irregular narrowing of the right 
middle cerebral artery. Anterior cerebral arteries are patent. There are 
bilateral posterior communicating arteries Impression IMPRESSION:   
1. No flow is demonstrated within the right vertebral artery and local stenosis 
flow is noted within the left vertebral artery. No flow is demonstrated within 
the basilar artery. 2. 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 Fusiform dilatation of the left cavernous carotid 4. Marked narrowing of the right cavernous carotid with fusiform dilatation of 
the distal right internal carotid artery 5. Multifocal areas of stenosis in the middle cerebral arteries bilaterally Review of Systems: A comprehensive review of systems was negative except for: Constitutional: positive for fatigue and malaise Eyes: positive for visual disturbance Musculoskeletal: positive for myalgias, arthralgias and stiff joints Neurological: positive for memory problems, coordination problems and gait problems Behvioral/Psych: positive for Memory loss Vitals:  
 05/10/19 0409 05/10/19 0757 05/10/19 1153 05/10/19 1533 BP: 139/70 156/85 142/81 142/80 Pulse: 63 66 61 61 Resp: 18 18 18 18 Temp: 98.6 °F (37 °C) 98.3 °F (36.8 °C) 98.2 °F (36.8 °C) 97.7 °F (36.5 °C) SpO2: 97% 97% 97% 99% Weight:      
Height:      
 
Objective: I 
 
 
NEUROLOGICAL EXAM: 
 
Appearance: The patient is well developed, well nourished, provides a fair history and is in no acute distress. Mental Status: Oriented to time, place and person, and the president, cognitive function is low and probably mildly abnormal and speech is fluent and no aphasia or dysarthria. Mood and affect appropriate. Cranial Nerves:   LHH on visual fields. Fundi are benign, disc are flat, no lesions seen on funduscopy. NILE, EOM's full, no nystagmus, no ptosis. Facial sensation is normal. Corneal reflexes are not tested. Facial movement is asymmetric on the left. Hearing is abnormal bilaterally. Palate is midline with normal sternocleidomastoid and trapezius muscles are normal. Tongue is midline. Neck without meningismus or bruits Temporal arteries are not tender or enlarged TMJ areas are not tender on palpation Motor:  4/5 strength in upper and lower proximal and distal muscles, but patient has a mild left-sided weakness and slight arm drift. Normal bulk and increased tone bilaterally, right side greater than left with cogwheel rigidity more on the right side as compared to the left. . No fasciculations. Rapid alternating movement is normal on the right, but decreased on the left. Reflexes:   Deep tendon reflexes 2+/4 on the left, and 1+/4 on the right No babinski or clonus present Sensory:   Normal to touch, pinprick and vibration and temperature decreased in both feet. DSS is intact Gait:  Not tested gait since patient has restraints on. Tremor:   No tremor noted. Cerebellar:   Left sided dysmetria abnormal cerebellar signs present on finger-nose-finger exam.  Romberg and tandem could not be tested Neurovascular:  Normal heart sounds and regular rhythm, peripheral pulses decreased, and no carotid bruits. Assessment: ICD-10-CM ICD-9-CM 1. Acute ischemic stroke (HCC) I63.9 434.91   
2. Bilateral carotid artery stenosis I65.23 433.10   
  433.30 3. Late onset Alzheimer's disease without behavioral disturbance G30.1 331.0 F02.80 294.10 4. Left homonymous hemianopsia due to recent cerebral infarction I69.398 438.7 H53.462 368.46   
5. Parkinson's disease (tremor, stiffness, slow motion, unstable posture) ([address] G20 332.0 6. Thrombotic stroke involving right middle cerebral artery [facility] I63.311 434.01   
7. TIA (transient ischemic attack) G45.9 435.9 Active Problems: 
  TIA (transient ischemic attack) (5/5/2019) Thrombotic stroke involving right middle cerebral artery ([facility] (5/6/2019) Bilateral carotid artery stenosis (5/6/2019) Left homonymous hemianopsia due to recent cerebral infarction (5/6/2019) Parkinson's disease (tremor, stiffness, slow motion, unstable posture) ([facility] (5/6/2019) Late onset Alzheimer's disease without behavioral disturbance (5/6/2019) Stroke (cerebrum) ([facility] (5/6/2019) Cerebral aneurysm without rupture, left PCA 12 mm aneurysm (5/8/2019) Plan:  
 
Patient remained stable, we discussed his CTA with the patient, and explained the plans for follow-up etc., he seems agreeable to plans as above. We can see the patient in follow-up in 2 to 4 weeks in the office, for him to interventional neuroradiology for evaluation and consideration for treatment for his aneurysms. 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 He also has severe intracranial disease, with occluded distal right vertebral artery, severely stenotic left vertebral artery, and severely stenotic and narrowed basilar artery and bilateral posterior cerebral arteries also severely diseased with probable occlusion on the left and severe stenosis on the right. Patient can now concentrate on therapy, as we have clearly defined his vascular problems. We will need to talk to the patient and the family about how aggressive they want to be.  
 
Patient with new left cerebellar infarct, and bilateral vertebral artery disease and basilar artery disease and possible aneurysm of the posterior cerebral artery, so we will get CTA to define all these lesions and then see whether or not he needs intervention Patient appears to have a mild left hemiparesis and left homonymous hemianopsia, and the question is this is new or old, and he needs an MRI scan. I reviewed his MRI and MRA personally on the PACS system myself and I agree with reports as above his need for CTA. Have discussed with the hospitalist we will hydrate him today and then do CTA We will increase his Aggrenox to twice a day, and continue his Sinemet but increase the dose to 3 times a day of the lower version. Difficult case, discussed with the patient and the family in detail, need to make sure there is no cardioembolic source, or other neurologic cause for his symptoms. Patient obviously a high risk for further neurological decompensation or worsening severe morbidity and possibly mortality if his disease of Alzheimer's disease, vascular disease is prominent, although untreated in addition to his Parkinson's disease Continue excellent medical care as you are, we will follow closely with you. Discussed with hospitalist and the family. Signed By: Isabel Joseph MD   
 May 10, 2019 CC: Shaniqua Castellanos NP 
FAX: [phone]

## 2019-05-11 NOTE — PROGRESS NOTES
Late Entry: 
 
CM note 5/10/2019 Transition Note to SNF/Rehab 
 
SNF/Rehab Transition: 
Patient has been accepted to [facility] Patient will transported by stretcher via [facility] and expected to leave at 5:30pm. 
 
Communication to SNF/Rehab: 
Bedside RN,Precious and Jeannie, has been notified to update the transition plan to the facility and call report (phone number [phone]). Discharge information has been updated on the AVS. Discharge instructions to be fax'd to facility at [facility] # [phone]). Communication to Patient/Family: Met with patient and Zoya Marquez , patients  Friend and caregiver and they are agreeable to the transition plan. SNF/Rehab Transition: 
Patient to follow-up with Home Health: N/A 
PCP/Specialist: PCP - Shawna Pond, NP Ambulatory Care Management: N/A Reviewed and confirmed with facility, [facility], can manage the patient care needs for the following:  
 
Vilma Newell with (X) only those applicable: 
 
Medication: 
[]  Medications will be available at the facility []  IV Antibiotics []  Controlled Substance - hard copy to be sent with patient  
[]  Weekly Labs Equipment: 
[]  CPAP/BiPAP []  Wound Vacuum 
[]  Amezcua or Urinary Device 
[]  PICC/Central Line 
[]  Nebulizer 
[]  Ventilator Treatment: 
[]Isolation (for MRSA, VRE, etc.) []Surgical Drain Management []Tracheostomy Care 
[]Dressing Changes []Dialysis with transportation []PEG Care []Oxygen []Daily Weights for Heart Failure Dietary: 
[]Any diet limitations []Tube Feedings []Total Parenteral Management (TPN) Advanced Care Plan: 
[]Surrogate Decision Maker of Care 
[]POA []Communicated Code Status N/A Other: Patient plans to go home from [facility] with current Caregiver Zoya Marquez Patient was offered [address] and signed form placed on chart. Referral sent to [facility] via [employer] and patient accepted. Caregiver and patient in agreement with plan of care. [facility] transport requested via [employer] and transport accepted for 5:30pm.  PCS, Facesheet and H&P placed on chart. Nursing to FAX AVS to facility ahead of transport so that all medications can be arranged. Hard copies of any narcotic medications to be sent with patient to facility. Care Management Interventions PCP Verified by CM: Yes Mode of Transport at Discharge: BLS([facility] stretcher) Transition of Care Consult (CM Consult): Other(Acute rehab. ) Discharge Durable Medical Equipment: (Patient has cane and walker at home. ) Physical Therapy Consult: Yes Occupational Therapy Consult: Yes Speech Therapy Consult: Yes Current Support Network: [address] [place], [place] [address] Confirm Follow Up Transport: Family Plan discussed with Pt/Family/Caregiver: Yes Freedom of Choice Offered: Yes Discharge Location Discharge Placement: Skilled nursing facility([facility]) Garcia Graham, RN, BSN, ACM [address] [facility]   [phone]

## 2019-06-14 ENCOUNTER — APPOINTMENT (OUTPATIENT)
Dept: GENERAL RADIOLOGY | Age: 79
End: 2019-06-14
Attending: EMERGENCY MEDICINE
Payer: MEDICARE

## 2019-06-14 ENCOUNTER — HOSPITAL ENCOUNTER (EMERGENCY)
Age: 79
Discharge: HOME | End: 2019-06-14
Attending: EMERGENCY MEDICINE
Payer: MEDICARE

## 2019-06-14 VITALS
HEIGHT: 72 IN | SYSTOLIC BLOOD PRESSURE: 136 MMHG | BODY MASS INDEX: 25.33 KG/M2 | WEIGHT: 187 LBS | RESPIRATION RATE: 17 BRPM | OXYGEN SATURATION: 96 % | DIASTOLIC BLOOD PRESSURE: 76 MMHG | HEART RATE: 56 BPM | TEMPERATURE: 98 F

## 2019-06-14 DIAGNOSIS — R79.0 LOW MAGNESIUM LEVEL: ICD-10-CM

## 2019-06-14 DIAGNOSIS — R00.1 BRADYCARDIA: Primary | ICD-10-CM

## 2019-06-14 DIAGNOSIS — N18.9 CHRONIC RENAL IMPAIRMENT, UNSPECIFIED CKD STAGE: ICD-10-CM

## 2019-06-14 DIAGNOSIS — G20 PARKINSON'S DISEASE (HCC): ICD-10-CM

## 2019-06-14 LAB
ALBUMIN SERPL-MCNC: 3.8 G/DL (ref 3.5–5)
ALBUMIN/GLOB SERPL: 1 {RATIO} (ref 1.1–2.2)
ALP SERPL-CCNC: 99 U/L (ref 45–117)
ALT SERPL-CCNC: 7 U/L (ref 12–78)
ANION GAP SERPL CALC-SCNC: 6 MMOL/L (ref 5–15)
AST SERPL-CCNC: 9 U/L (ref 15–37)
BASOPHILS # BLD: 0 K/UL (ref 0–0.1)
BASOPHILS NFR BLD: 0 % (ref 0–1)
BILIRUB SERPL-MCNC: 0.3 MG/DL (ref 0.2–1)
BUN SERPL-MCNC: 23 MG/DL (ref 6–20)
BUN/CREAT SERPL: 11 (ref 12–20)
CALCIUM SERPL-MCNC: 9 MG/DL (ref 8.5–10.1)
CHLORIDE SERPL-SCNC: 109 MMOL/L (ref 97–108)
CK SERPL-CCNC: 62 U/L (ref 39–308)
CO2 SERPL-SCNC: 27 MMOL/L (ref 21–32)
CREAT SERPL-MCNC: 2.11 MG/DL (ref 0.7–1.3)
DIFFERENTIAL METHOD BLD: ABNORMAL
EOSINOPHIL # BLD: 0.2 K/UL (ref 0–0.4)
EOSINOPHIL NFR BLD: 4 % (ref 0–7)
ERYTHROCYTE [DISTWIDTH] IN BLOOD BY AUTOMATED COUNT: 13.7 % (ref 11.5–14.5)
GLOBULIN SER CALC-MCNC: 3.9 G/DL (ref 2–4)
GLUCOSE SERPL-MCNC: 93 MG/DL (ref 65–100)
HCT VFR BLD AUTO: 35.2 % (ref 36.6–50.3)
HGB BLD-MCNC: 11.9 G/DL (ref 12.1–17)
IMM GRANULOCYTES # BLD AUTO: 0 K/UL (ref 0–0.04)
IMM GRANULOCYTES NFR BLD AUTO: 0 % (ref 0–0.5)
LYMPHOCYTES # BLD: 1.3 K/UL (ref 0.8–3.5)
LYMPHOCYTES NFR BLD: 28 % (ref 12–49)
MAGNESIUM SERPL-MCNC: 2.5 MG/DL (ref 1.6–2.4)
MCH RBC QN AUTO: 31.2 PG (ref 26–34)
MCHC RBC AUTO-ENTMCNC: 33.8 G/DL (ref 30–36.5)
MCV RBC AUTO: 92.4 FL (ref 80–99)
MONOCYTES # BLD: 0.5 K/UL (ref 0–1)
MONOCYTES NFR BLD: 11 % (ref 5–13)
NEUTS SEG # BLD: 2.7 K/UL (ref 1.8–8)
NEUTS SEG NFR BLD: 57 % (ref 32–75)
NRBC # BLD: 0 K/UL (ref 0–0.01)
NRBC BLD-RTO: 0 PER 100 WBC
PLATELET # BLD AUTO: 204 K/UL (ref 150–400)
PMV BLD AUTO: 10.8 FL (ref 8.9–12.9)
POTASSIUM SERPL-SCNC: 4.6 MMOL/L (ref 3.5–5.1)
PROT SERPL-MCNC: 7.7 G/DL (ref 6.4–8.2)
RBC # BLD AUTO: 3.81 M/UL (ref 4.1–5.7)
SODIUM SERPL-SCNC: 142 MMOL/L (ref 136–145)
TROPONIN I SERPL-MCNC: <0.05 NG/ML
TROPONIN I SERPL-MCNC: <0.05 NG/ML
WBC # BLD AUTO: 4.8 K/UL (ref 4.1–11.1)

## 2019-06-14 PROCEDURE — 82550 ASSAY OF CK (CPK): CPT

## 2019-06-14 PROCEDURE — 93005 ELECTROCARDIOGRAM TRACING: CPT

## 2019-06-14 PROCEDURE — 99284 EMERGENCY DEPT VISIT MOD MDM: CPT

## 2019-06-14 PROCEDURE — 83735 ASSAY OF MAGNESIUM: CPT

## 2019-06-14 PROCEDURE — 84484 ASSAY OF TROPONIN QUANT: CPT

## 2019-06-14 PROCEDURE — 80053 COMPREHEN METABOLIC PANEL: CPT

## 2019-06-14 PROCEDURE — 71045 X-RAY EXAM CHEST 1 VIEW: CPT

## 2019-06-14 PROCEDURE — 36415 COLL VENOUS BLD VENIPUNCTURE: CPT

## 2019-06-14 PROCEDURE — 85025 COMPLETE CBC W/AUTO DIFF WBC: CPT

## 2019-06-14 RX ORDER — MAGNESIUM 200 MG
1 TABLET ORAL DAILY
Qty: 7 TAB | Refills: 0 | Status: SHIPPED | OUTPATIENT
Start: 2019-06-14 | End: 2019-06-21

## 2019-06-14 NOTE — ED TRIAGE NOTES
Patient arrives after being sent in by [facility] aid for HR of 35. Patient denies any lightheadedness, chest pain, dsypnea.

## 2019-06-15 LAB
ATRIAL RATE: 54 BPM
ATRIAL RATE: 62 BPM
CALCULATED P AXIS, ECG09: -11 DEGREES
CALCULATED P AXIS, ECG09: 41 DEGREES
CALCULATED R AXIS, ECG10: -8 DEGREES
CALCULATED R AXIS, ECG10: 1 DEGREES
CALCULATED T AXIS, ECG11: 43 DEGREES
CALCULATED T AXIS, ECG11: 45 DEGREES
DIAGNOSIS, 93000: NORMAL
DIAGNOSIS, 93000: NORMAL
P-R INTERVAL, ECG05: 152 MS
P-R INTERVAL, ECG05: 152 MS
Q-T INTERVAL, ECG07: 418 MS
Q-T INTERVAL, ECG07: 448 MS
QRS DURATION, ECG06: 84 MS
QRS DURATION, ECG06: 90 MS
QTC CALCULATION (BEZET), ECG08: 424 MS
QTC CALCULATION (BEZET), ECG08: 424 MS
VENTRICULAR RATE, ECG03: 54 BPM
VENTRICULAR RATE, ECG03: 62 BPM

## 2019-06-15 NOTE — ED PROVIDER NOTES
EMERGENCY DEPARTMENT HISTORY AND PHYSICAL EXAM  
     
 
Date: 6/14/2019 Patient Name: Layla Richey History of Presenting Illness Chief Complaint Patient presents with  Slow Heart Rate Patient states that he was advised by Home Health to come to ED for further evaluation of a heart rate of 35 Patient denies any other symptoms History Provided By:  Patient HPI: Layla Richey is a 69 y.o. male, with significant pmhx of recent CVA, CAD, hypertension, Parkinson's, cholesterol who presents ambulatory to the ED with report of having low heart rate was evaluated by home health earlier this evening. Patient with recent discharge from hospital and skilled nursing facility after his previous stroke. Family notes no changes in his medications or addition. Patient without complaints specifically denying chest pain, shortness of breath, dizziness. Per family home home health nurse checked his heart rate and noted to be 35/36 and rechecked it after he got up to go to the bathroom with no change. Patient urged to come to the emergency department for further evaluation of low heart rate. Patient without pacemaker or Being followed by cardiology. On arrival to the emergency department patient with normal sinus rhythm at a rate of 62. Patient continues to have no complaints at time of initial evaluation by physician. Patient specifically denies any associated fevers, chills, nausea, vomiting, diarrhea, abd pain, CP, SOB, urinary sxs, changes in BM, or headache. PCP: Sherry Shah NP Social Hx: denies tobacco  denies EtOH , denies Illicit Drugs There are no other complaints, changes, or physical findings at this time. No Known Allergies Current Outpatient Medications Medication Sig Dispense Refill  magnesium 200 mg tab Take 1 Tab by mouth daily for 7 days. 7 Tab 0  carbidopa-levodopa (SINEMET)  mg per tablet Take 1 Tab by mouth three (3) times daily. 90 Tab 3  
 aspirin-dipyridamole (AGGRENOX)  mg per SR capsule Take 1 Cap by mouth two (2) times a day. 60 Cap 3  
 metoprolol succinate (TOPROL-XL) 25 mg XL tablet Take 1 Tab by mouth daily. 30 Tab 6  pravastatin (PRAVACHOL) 80 mg tablet Take 1 Tab by mouth daily. 30 Tab 6  polyethylene glycol (MIRALAX) 17 gram/dose powder Take 17 g by mouth daily as needed (constipation).  calcium-cholecalciferol, D3, (CALTRATE 600+D) tablet Take 1 Tab by mouth daily.  citalopram (CELEXA) 10 mg tablet Take 10 mg by mouth every evening.  raNITIdine (ZANTAC) 150 mg tablet Take 150 mg by mouth daily.  amLODIPine (NORVASC) 10 mg tablet Take 10 mg by mouth daily.  aspirin 81 mg chewable tablet Take 81 mg by mouth daily. Past History Past Medical History: 
Past Medical History:  
Diagnosis Date  CAD (coronary artery disease)  Hypertension  Other ill-defined conditions(799.89)   
 parkinson's  Other ill-defined conditions(799.89)   
 high cholesterol  Stroke [facility]) 2006  
 left sided weakness Past Surgical History: 
Past Surgical History:  
Procedure Laterality Date  HX CATARACT REMOVAL Bilateral   
 HX HEART CATHETERIZATION  2006  OH COLONOSCOPY FLX DX W/COLLJ SPEC WHEN PFRMD  5/12/2014 Family History: 
History reviewed. No pertinent family history. Social History: 
Social History Tobacco Use  Smoking status: Former Smoker  Smokeless tobacco: Never Used Substance Use Topics  Alcohol use: No  
 Drug use: Never Allergies: 
No Known Allergies Review of Systems Review of Systems Constitutional: Negative for chills and fever. HENT: Negative. Eyes: Negative. Respiratory: Negative for cough, chest tightness and shortness of breath. Cardiovascular: Negative for chest pain, palpitations and leg swelling. Gastrointestinal: Negative for abdominal pain, diarrhea, nausea and vomiting. Endocrine: Negative. Genitourinary: Negative for difficulty urinating and dysuria. Musculoskeletal: Negative for myalgias. Skin: Negative. Neurological: Negative. Psychiatric/Behavioral: Negative. All other systems reviewed and are negative. Physical Exam  
Physical Exam  
Constitutional: He is oriented to person, place, and time. He appears well-developed and well-nourished. No distress. HENT:  
Head: Normocephalic and atraumatic. Nose: Nose normal.  
Mouth/Throat: No oropharyngeal exudate. Eyes: Pupils are equal, round, and reactive to light. Conjunctivae and EOM are normal.  
Neck: Normal range of motion. Neck supple. No JVD present. Cardiovascular: Normal rate, regular rhythm, normal heart sounds and intact distal pulses. Exam reveals no friction rub. No murmur heard. Pulmonary/Chest: Effort normal and breath sounds normal. No stridor. No respiratory distress. He has no wheezes. He has no rales. Abdominal: Soft. Bowel sounds are normal. He exhibits no distension. There is no tenderness. There is no rebound. Musculoskeletal: Normal range of motion. He exhibits no tenderness. Neurological: He is alert and oriented to person, place, and time. No cranial nerve deficit. Skin: Skin is warm and dry. No rash noted. He is not diaphoretic. Psychiatric: He has a normal mood and affect. His speech is normal and behavior is normal. Judgment and thought content normal. Cognition and memory are normal.  
Nursing note and vitals reviewed. Diagnostic Study Results Labs - Recent Results (from the past 12 hour(s)) EKG, 12 LEAD, INITIAL Collection Time: 06/14/19  5:45 PM  
Result Value Ref Range Ventricular Rate 62 BPM  
 Atrial Rate 62 BPM  
 P-R Interval 152 ms QRS Duration 84 ms Q-T Interval 418 ms QTC Calculation (Bezet) 424 ms Calculated P Axis 41 degrees Calculated R Axis 1 degrees Calculated T Axis 45 degrees Diagnosis Normal sinus rhythm Normal ECG When compared with ECG of 05-MAY-2019 15:36, 
premature ventricular complexes are no longer present Nonspecific T wave abnormality no longer evident in Inferior leads CBC WITH AUTOMATED DIFF Collection Time: 06/14/19  6:03 PM  
Result Value Ref Range WBC 4.8 4.1 - 11.1 K/uL  
 RBC 3.81 (L) 4.10 - 5.70 M/uL  
 HGB 11.9 (L) 12.1 - 17.0 g/dL HCT 35.2 (L) 36.6 - 50.3 % MCV 92.4 80.0 - 99.0 FL  
 MCH 31.2 26.0 - 34.0 PG  
 MCHC 33.8 30.0 - 36.5 g/dL  
 RDW 13.7 11.5 - 14.5 % PLATELET 460 914 - 225 K/uL MPV 10.8 8.9 - 12.9 FL  
 NRBC 0.0 0  WBC ABSOLUTE NRBC 0.00 0.00 - 0.01 K/uL NEUTROPHILS 57 32 - 75 % LYMPHOCYTES 28 12 - 49 % MONOCYTES 11 5 - 13 % EOSINOPHILS 4 0 - 7 % BASOPHILS 0 0 - 1 % IMMATURE GRANULOCYTES 0 0.0 - 0.5 % ABS. NEUTROPHILS 2.7 1.8 - 8.0 K/UL  
 ABS. LYMPHOCYTES 1.3 0.8 - 3.5 K/UL  
 ABS. MONOCYTES 0.5 0.0 - 1.0 K/UL  
 ABS. EOSINOPHILS 0.2 0.0 - 0.4 K/UL  
 ABS. BASOPHILS 0.0 0.0 - 0.1 K/UL  
 ABS. IMM. GRANS. 0.0 0.00 - 0.04 K/UL  
 DF AUTOMATED METABOLIC PANEL, COMPREHENSIVE Collection Time: 06/14/19  6:03 PM  
Result Value Ref Range Sodium 142 136 - 145 mmol/L Potassium 4.6 3.5 - 5.1 mmol/L Chloride 109 (H) 97 - 108 mmol/L  
 CO2 27 21 - 32 mmol/L Anion gap 6 5 - 15 mmol/L Glucose 93 65 - 100 mg/dL BUN 23 (H) 6 - 20 MG/DL Creatinine 2.11 (H) 0.70 - 1.30 MG/DL  
 BUN/Creatinine ratio 11 (L) 12 - 20 GFR est AA 37 (L) >60 ml/min/1.73m2 GFR est non-AA 31 (L) >60 ml/min/1.73m2 Calcium 9.0 8.5 - 10.1 MG/DL Bilirubin, total 0.3 0.2 - 1.0 MG/DL  
 ALT (SGPT) 7 (L) 12 - 78 U/L  
 AST (SGOT) 9 (L) 15 - 37 U/L Alk. phosphatase 99 45 - 117 U/L Protein, total 7.7 6.4 - 8.2 g/dL Albumin 3.8 3.5 - 5.0 g/dL Globulin 3.9 2.0 - 4.0 g/dL A-G Ratio 1.0 (L) 1.1 - 2.2 CK W/ REFLX CKMB Collection Time: 06/14/19  6:03 PM  
Result Value Ref Range  CK 62 39 - 308 U/L  
 TROPONIN I Collection Time: 06/14/19  6:03 PM  
Result Value Ref Range Troponin-I, Qt. <0.05 <0.05 ng/mL MAGNESIUM Collection Time: 06/14/19  8:22 PM  
Result Value Ref Range Magnesium 2.5 (H) 1.6 - 2.4 mg/dL TROPONIN I Collection Time: 06/14/19  8:22 PM  
Result Value Ref Range Troponin-I, Qt. <0.05 <0.05 ng/mL Radiologic Studies -  
XR CHEST PORT Final Result IMPRESSION: No evidence of acute cardiopulmonary process. Unchanged bilateral  
calcified pleural plaques, suggestive of asbestos related pleural disease. CT Results  (Last 48 hours) None CXR Results  (Last 48 hours) 06/14/19 1945  XR CHEST PORT Final result Impression:  IMPRESSION: No evidence of acute cardiopulmonary process. Unchanged bilateral  
calcified pleural plaques, suggestive of asbestos related pleural disease. Narrative:  INDICATION: Chest pain COMPARISON: 5/5/2019 FINDINGS: AP portable imaging of the chest performed at 7:41 PM demonstrates a  
stable cardiomediastinal silhouette. Bilateral calcified pleural plaques are not  
significantly changed. There is no new airspace disease or pleural effusion. No  
significant osseous abnormalities are seen. Medical Decision Making I am the first provider for this patient. I reviewed the vital signs, available nursing notes, past medical history, past surgical history, family history and social history. Vital Signs-Reviewed the patient's vital signs. Patient Vitals for the past 12 hrs: 
 Temp Pulse Resp BP SpO2  
06/14/19 2001  (!) 57 17  98 % 06/14/19 1957    143/71   
06/14/19 1740 98 °F (36.7 °C) 62 17 146/73 98 % Pulse Oximetry Analysis - 98% on RA Cardiac Monitor:  
Rate: 58 bpm 
Rhythm: sinus benoit Records Reviewed: Nursing Notes, Old Medical Records, Previous electrocardiograms, Previous Radiology Studies and Previous Laboratory Studies Provider Notes (Medical Decision Making): DDX: 
Medication reaction/side effect, arrhythmia, electrolyte abnoramality Plan: 
Ekg, labs, mag, ambulation trial 
 
Impression: 
bradycardia ED Course:  
Initial assessment performed. The patients presenting problems have been discussed, and they are in agreement with the care plan formulated and outlined with them. I have encouraged them to ask questions as they arise throughout their visit. I reviewed our electronic medical record system for any past medical records that were available that may contribute to the patients current condition, the nursing notes and and vital signs from today's visit Nursing notes will be reviewed as they become available in realtime while the pt has been in the ED. Diogo Harper MD 
 
EKG interpretation 1745: NSR, nl Axis, rate 62; , QRS 84, QTc 424; no acute ischemia; Diogo Harper MD 
 
EKG interpretation 2029: sinus benoit, nl Axis, rate 54; , QRS 90, QTc 424; no acute ischemia; Diogo Harper MD 
 
 
I personally reviewed pt's imaging. Official read by radiology noted above. Diogo Harper MD 
 
  
 
 
9:45 PM 
Progress note: 
Pt noted to be feeling better, ready for discharge. Discussed lab and imaging findings with pt and/or family, specifically noting low magnesium, negative cxr and no sx while walking, HR remained 60's-80's. Pt will follow up with PCP as instructed. All questions have been answered, pt voiced understanding and agreement with plan. Specific return precautions provided in addition to instructions for pt to return to the ED immediately should sx worsen at any time. Diogo Harper MD 
 
 
Critical Care Time:  
 
none Diagnosis Clinical Impression: 1. Bradycardia 2. Low magnesium level 3. Chronic renal impairment, unspecified CKD stage 4. Parkinson's disease ([facility] [address] PLAN: 
1. Current Discharge Medication List  
  
START taking these medications Details  
magnesium 200 mg tab Take 1 Tab by mouth daily for 7 days. Qty: 7 Tab, Refills: 0  
  
  
 
2. Follow-up Information Follow up With Specialties Details Why Contact Info True Luna NP Family Practice Schedule an appointment as soon as possible for a visit in 2 days  [address] [phone] Return to ED if worse Disposition: 
9:46 PM 
The patient's results have been reviewed with family and/or caregiver. They verbally convey their understanding and agreement of the patient's signs, symptoms, diagnosis, treatment and prognosis and additionally agree to follow up as recommended in the discharge instructions or to return to the Emergency Room should the patient's condition change prior to their follow-up appointment. The family and/or caregiver verbally agrees with the care-plan and all of their questions have been answered. The discharge instructions have also been provided to the them with educational information regarding the patient's diagnosis as well a list of reasons why the patient would want to return to the ER prior to their follow-up appointment should their condition change. Ayo Colon MD 
 
 
 
 
Please note that this dictation was completed with [employer], the computer voice recognition software. Quite often unanticipated grammatical, syntax, homophones, and other interpretive errors are inadvertently transcribed by the computer software. Please disregard these errors. Please excuse any errors that have escaped final proofreading This note will not be viewable in [address]

## 2019-06-15 NOTE — DISCHARGE INSTRUCTIONS
Patient Education        Bradycardia: Care Instructions  Your Care Instructions    Bradycardia is a slow heart rate. If your heart beats too slowly, it can't supply your body with enough blood. This can make you weak or dizzy. Or it may make you pass out. Sometimes medicine can cause this problem. If this happens, your doctor may have you adjust one of your medicines. If a medicine is not the problem, your doctor may recommend a pacemaker. It is important to treat bradycardia so that you don't get more serious health problems. Your doctor will want to see you on a routine schedule to make sure that your heartbeat is normal.  Follow-up care is a key part of your treatment and safety. Be sure to make and go to all appointments, and call your doctor if you are having problems. It's also a good idea to know your test results and keep a list of the medicines you take. How can you care for yourself at home? · Take your medicines exactly as prescribed. Call your doctor if you think you are having a problem with your medicine. If your bradycardia is caused by another disease, your doctor will try to treat the disease. If it is caused by heart medicines, he or she will adjust your medicines. · Make lifestyle changes to improve your heart health. ? Get regular exercise. Try for 30 minutes on most days of the week. If you do not have other heart problems, you likely do not have limits on the type or level of activity that you can do. You may want to walk, swim, bike, or do other activities. Ask your doctor what level of exercise is safe for you. ? To control your cholesterol, avoid foods with a lot of fat, saturated fat, or sodium. Try to eat more fiber. And if your doctor says it's okay, get some exercise on most days. ? Do not smoke. Smoking can make your heart condition worse. If you need help quitting, talk to your doctor about stop-smoking programs and medicines.  These can increase your chances of quitting for good.  ? Limit alcohol to 2 drinks a day for men and 1 drink a day for women. Too much alcohol can cause health problems. Pacemaker  If you have a pacemaker, you will get more specific information about it. Be sure to:  · Check your pulse as your doctor tells you. · Have your pacemaker checked as often as your doctor recommends. You may be able to do this over the phone or computer. · Avoid strong magnetic or electrical fields. These include MRIs, welding equipment, and generators. · You will be checked several times right after you get your pacemaker and when it is time to have the battery changed. Batteries last for 5 to 15 years. · You can talk on a cell phone. But keep it 6 inches away from your pacemaker. · Microwaves, TVs, radios, and kitchen and bathroom appliances won't harm you. When should you call for help? Call 911 anytime you think you may need emergency care. For example, call if:    · You have symptoms of sudden heart failure. These may include:  ? Severe trouble breathing. ? A fast or irregular heartbeat. ? Coughing up pink, foamy mucus. ? You passed out.     · You have symptoms of a stroke. These may include:  ? Sudden numbness, tingling, weakness, or loss of movement in your face, arm, or leg, especially on only one side of your body. ? Sudden vision changes. ? Sudden trouble speaking. ? Sudden confusion or trouble understanding simple statements. ? Sudden problems with walking or balance. ? A sudden, severe headache that is different from past headaches.    Call your doctor now or seek immediate medical care if:    · You have new or changed symptoms of heart failure, such as:  ? New or increased shortness of breath. ? New or worse swelling in your legs, ankles, or feet. ? Sudden weight gain, such as more than 2 to 3 pounds in a day or 5 pounds in a week. (Your doctor may suggest a different range of weight gain.)  ? Feeling dizzy or lightheaded or like you may faint. ?  Feeling so tired or weak that you cannot do your usual activities. ? Not sleeping well. Shortness of breath wakes you at night. You need extra pillows to prop yourself up to breathe easier.    Watch closely for changes in your health, and be sure to contact your doctor if:    · You do not get better as expected. Where can you learn more? Go to http://lorenzo-sofia.info/. Enter A696 in the search box to learn more about \"Bradycardia: Care Instructions. \"  Current as of: July 22, 2018  Content Version: 11.9  © 3655-3410 [employer]. Care instructions adapted under license by [employer] (which disclaims liability or warranty for this information). If you have questions about a medical condition or this instruction, always ask your healthcare professional. [place] [address] any warranty or liability for your use of this information. Patient Education        Cardiac Arrhythmia: Care Instructions  Your Care Instructions    A cardiac arrhythmia is a change in the normal rhythm of the heart. Your heart may beat too fast or too slow or beat with an irregular or skipping rhythm. A change in the heart's rhythm may feel like a really strong heartbeat or a fluttering in your chest. A severe heart rhythm problem can keep the body from getting the blood it needs. This can result in shortness of breath, lightheadedness, and fainting. You may take medicine to treat your condition. Your doctor may recommend a pacemaker or recommend catheter ablation to destroy small parts of the heart that are causing a rhythm problem. Another possible treatment is an implantable cardioverter-defibrillator (ICD). An ICD is a device that gives the heart a shock to return the heart to a normal rhythm. Follow-up care is a key part of your treatment and safety. Be sure to make and go to all appointments, and call your doctor if you are having problems.  It's also a good idea to know your test results and keep a list of the medicines you take. How can you care for yourself at home?  [facility]    · Be safe with medicines. Take your medicines exactly as prescribed. Call your doctor if you think you are having a problem with your medicine. You will get more details on the specific medicines your doctor prescribes.     · If you received a pacemaker or an ICD, you will get a fact sheet about it.     · Wear medical alert jewelry that says you have an abnormal heart rhythm. You can buy this at most drugstores.    Lifestyle changes    · Eat a heart-healthy diet.     · Stay at a healthy weight. Lose weight if you need to.     · Avoid nicotine, too much alcohol, and illegal drugs (meth, speed, and cocaine). Also, get enough sleep and do not overeat.     · Ask your doctor whether you can take over-the-counter medicines (such as decongestants). These can make your heart beat fast.     · Talk to your doctor about any limits to activities, such as driving, or tasks where you use power tools or ladders. Activity    · Start light exercise if your doctor says you can. Even a small amount will help you get stronger, have more energy, and manage your stress.     · Get regular exercise. Try for 30 minutes on most days of the week. Ask your doctor what level of exercise is safe for you. If activity is not likely to cause health problems, you probably do not have limits on the type or level of activity that you can do. You may want to walk, swim, bike, or do other activities.     · When you exercise, watch for signs that your heart is working too hard. You are pushing too hard if you cannot talk while you exercise. If you become short of breath or dizzy or have chest pain, sit down and rest.     · Check your pulse daily. Place two fingers on the artery at the palm side of your wrist, in line with your thumb. If your heartbeat seems uneven, talk to your doctor. When should you call for help?   Call 911 anytime you think you may need emergency care. For example, call if:    · You have symptoms of sudden heart failure. These may include:  ? Severe trouble breathing. ? A fast or irregular heartbeat. ? Coughing up pink, foamy mucus. ? You passed out.     · You have signs of a stroke. These include:  ? Sudden numbness, paralysis, or weakness in your face, arm, or leg, especially on only one side of your body. ? New problems with walking or balance. ? Sudden vision changes. ? Drooling or slurred speech. ? New problems speaking or understanding simple statements, or feeling confused. ? A sudden, severe headache that is different from past headaches.    Call your doctor now or seek immediate medical care if:    · You have new or changed symptoms of heart failure, such as:  ? New or increased shortness of breath. ? New or worse swelling in your legs, ankles, or feet. ? Sudden weight gain, such as more than 2 to 3 pounds in a day or 5 pounds in a week. (Your doctor may suggest a different range of weight gain.)  ? Feeling dizzy or lightheaded or like you may faint. ? Feeling so tired or weak that you cannot do your usual activities. ? Not sleeping well. Shortness of breath wakes you at night. You need extra pillows to prop yourself up to breathe easier.    Watch closely for changes in your health, and be sure to contact your doctor if:    · You do not get better as expected. Where can you learn more? Go to http://lorenzo-sofia.info/. Enter K548 in the search box to learn more about \"Cardiac Arrhythmia: Care Instructions. \"  Current as of: July 22, 2018  Content Version: 11.9  © 4663-5922 [employer]. Care instructions adapted under license by [employer] (which disclaims liability or warranty for this information).  If you have questions about a medical condition or this instruction, always ask your healthcare professional. Gabbie Gardiner disclaims any warranty or liability for your use of this information. Patient Education        Chronic Kidney Disease: Care Instructions  Your Care Instructions    Chronic kidney disease happens when your kidneys don't work as well as they should. Your kidneys have a few important jobs. They remove waste from your blood. This waste leaves your body in your urine. They also balance your body's fluids and chemicals. When your kidneys don't work well, extra waste and fluid can build up. This can poison the body and sometimes cause death. The most common causes of this disease are diabetes and high blood pressure. In some cases, the disease develops in 2 to 3 months. But it usually develops over many years. If you take medicine and make healthy changes to your lifestyle, you may be able to prevent the disease from getting worse. But if your kidney damage gets worse, you may need dialysis or a kidney transplant. Dialysis uses a machine to filter waste from the blood. A transplant is surgery to give you a healthy kidney from another person. Follow-up care is a key part of your treatment and safety. Be sure to make and go to all appointments, and call your doctor if you are having problems. It's also a good idea to know your test results and keep a list of the medicines you take. How can you care for yourself at home?   Treatments and appointments    · Be safe with medicines. Take your medicines exactly as prescribed. Call your doctor if you have any problems with your medicine. You also may take medicine to control your blood pressure or to treat diabetes. Many people who have diabetes take blood pressure medicine.     · If you have diabetes, do your best to keep your blood sugar in your target range. You may do this by eating healthy food and exercising.  You may also take medicines.     · Go to your dialysis appointments if you have this treatment.     · Do not take ibuprofen (Advil, Motrin), naproxen (Aleve), or similar medicines, unless your doctor tells you to. These may make the disease worse.     · Do not take any vitamins, over-the-counter medicines, or herbal products without talking to your doctor first.     · Do not smoke or use other tobacco products. Smoking can reduce blood flow to the kidneys. If you need help quitting, talk to your doctor about stop-smoking programs and medicines. These can increase your chances of quitting for good.     · Do not drink alcohol or use illegal drugs.     · Talk to your doctor about an exercise plan. Exercise helps lower your blood pressure. It also makes you feel better.     · If you have an advance directive, let your doctor know. It may include a living will and a durable power of  for health care. If you don't have one, you may want to prepare one. It lets your doctor and loved ones know your health care wishes if you become unable to speak for yourself. Diet    · Talk to a registered dietitian. He or she can help you make a meal plan that is right for you. Most people with kidney disease need to limit salt (sodium), fluids, and protein. Some also have to limit potassium and phosphorus.     · You may have to give up many foods you like. But try to focus on the fact that this will help you stay healthy for as long as possible.     · If you have a hard time eating enough, talk to your doctor or dietitian about ways to add calories to your diet.     · Your diet may change as your disease changes. See your doctor for regular testing. And work with a dietitian to change your diet as needed. When should you call for help? Call 911 anytime you think you may need emergency care.  For example, call if:    · You passed out (lost consciousness).    Call your doctor now or seek immediate medical care if:    · You have less urine than normal or no urine.     · You have trouble urinating or can urinate only very small amounts.     · You are confused or have trouble thinking clearly.     · You feel weaker or more tired than usual.     · You are very thirsty, lightheaded, or dizzy.     · You have nausea and vomiting.     · You have new swelling of your arms or feet, or your swelling is worse.     · You have blood in your urine.     · You have new or worse trouble breathing.    Watch closely for changes in your health, and be sure to contact your doctor if:    · You have any problems with your medicine or other treatment. Where can you learn more? Go to http://lorenzo-sofia.info/. Enter N276 in the search box to learn more about \"Chronic Kidney Disease: Care Instructions. \"  Current as of: March 14, 2018  Content Version: 11.9  © 7464-8149 [employer]. Care instructions adapted under license by [employer] (which disclaims liability or warranty for this information). If you have questions about a medical condition or this instruction, always ask your healthcare professional. [address] any warranty or liability for your use of this information.

## 2019-06-15 NOTE — ED NOTES
Patient discharged with handout and education by MD. IV removed from [address] [place] [address], catheter intact, no redness/swelling. Assisted off unit via wheelchair, home via private car.

## 2019-06-17 ENCOUNTER — TELEPHONE (OUTPATIENT)
Dept: NEUROLOGY | Age: 79
End: 2019-06-17

## 2019-06-17 NOTE — TELEPHONE ENCOUNTER
----- Message from Lakeshia Coombs sent at 6/17/2019 10:41 AM EDT -----  Regarding: Dr. Aniket Stephenson  Mrs. Michael Melendez, pt's wife, requesting to speak with nurse in regards to upcoming appt scheduled tomorrow Tuesday 06/18/2019. Pt has an appt with PCP tomorrow and inquiring if this appt needed to be r/s when could pt be seen.   Best contact number [phone]

## 2019-06-18 ENCOUNTER — OFFICE VISIT (OUTPATIENT)
Dept: NEUROLOGY | Age: 79
End: 2019-06-18

## 2019-06-18 VITALS
BODY MASS INDEX: 22.75 KG/M2 | WEIGHT: 168 LBS | DIASTOLIC BLOOD PRESSURE: 65 MMHG | SYSTOLIC BLOOD PRESSURE: 135 MMHG | OXYGEN SATURATION: 98 % | HEART RATE: 77 BPM | HEIGHT: 72 IN

## 2019-06-18 DIAGNOSIS — I10 ESSENTIAL HYPERTENSION: ICD-10-CM

## 2019-06-18 DIAGNOSIS — I63.311 THROMBOTIC STROKE INVOLVING RIGHT MIDDLE CEREBRAL ARTERY (HCC): Primary | ICD-10-CM

## 2019-06-18 DIAGNOSIS — G20 PARKINSON'S DISEASE (TREMOR, STIFFNESS, SLOW MOTION, UNSTABLE POSTURE) (HCC): ICD-10-CM

## 2019-06-18 DIAGNOSIS — I67.1 CEREBRAL ANEURYSM WITHOUT RUPTURE: ICD-10-CM

## 2019-06-18 NOTE — PROGRESS NOTES
Name: Jass Garcia    Chief Complaint: stroke    Patient hospitalized may 6th 2019 he had a large right cerebellar infarct. He was treated at [facility] and seen by dr. Antonio De Santiago. He also has parkinson's and dementia;. He was already maximized on his statin. He has been having trouble with the left leg . Chart reviewed, particularly imaging and labs as well as all neurology notes admission and discharge notes. Assesment and Plan  1. Stroke  MRI of the brain dated May 6 2019 shows  · Large acute infarct of the inferior left cerebellum  ·  Flow voids within the vertebral arteries are diminished to absent  ·  Possible 12 mm aneurysm off the distal internal carotid artery  .8; A1c 5.9  Continue Aggrenox    2. Parkinsons  Continue Sinemet    3. Hyperlipidemia  Continue pravastatin    4. 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 Medications  Current Outpatient Medications   Medication Sig    magnesium 200 mg tab Take 1 Tab by mouth daily for 7 days.  carbidopa-levodopa (SINEMET)  mg per tablet Take 1 Tab by mouth three (3) times daily.  aspirin-dipyridamole (AGGRENOX)  mg per SR capsule Take 1 Cap by mouth two (2) times a day.  metoprolol succinate (TOPROL-XL) 25 mg XL tablet Take 1 Tab by mouth daily.  pravastatin (PRAVACHOL) 80 mg tablet Take 1 Tab by mouth daily.  polyethylene glycol (MIRALAX) 17 gram/dose powder Take 17 g by mouth daily as needed (constipation).  calcium-cholecalciferol, D3, (CALTRATE 600+D) tablet Take 1 Tab by mouth daily.  citalopram (CELEXA) 10 mg tablet Take 10 mg by mouth every evening.  raNITIdine (ZANTAC) 150 mg tablet Take 150 mg by mouth daily.  amLODIPine (NORVASC) 10 mg tablet Take 10 mg by mouth daily.  aspirin 81 mg chewable tablet Take 81 mg by mouth daily. No current facility-administered medications for this visit.          Medical History  Past Medical History:   Diagnosis Date    CAD (coronary artery disease)     Hypertension     Other ill-defined conditions(799.89)     parkinson's    Other ill-defined conditions(799.89)     high cholesterol    Stroke ([facility] [address] 2006    left sided weakness       Review of Systems   Constitutional: Negative for chills and fever. HENT: Negative for ear pain. Eyes: Negative for pain and discharge. Respiratory: Negative for cough and hemoptysis. Cardiovascular: Negative for chest pain and claudication. Gastrointestinal: Negative for constipation and diarrhea. Genitourinary: Negative for flank pain and hematuria. Musculoskeletal: Negative for back pain and myalgias. Skin: Negative for itching and rash. Neurological: Positive for weakness. Negative for headaches. Endo/Heme/Allergies: Negative for environmental allergies. Does not bruise/bleed easily. Psychiatric/Behavioral: Positive for memory loss. Negative for depression and hallucinations. Exam:  Visit Vitals  /65   Pulse 77   Ht 6' (1.829 m)   SpO2 98%   BMI 25.36 kg/m²      General: Well developed, well nourished. Patient in no apparent distress   Head: Normocephalic, atraumatic, anicteric sclera   Neck Normal ROM, No thyromegally   Lungs:  Clear to auscultation bilaterally, No wheezes or rubs   Cardiac: Regular rate and rhythm with no murmurs. Abd: Bowel sounds were audible. No tenderness on palpation   Ext: No pedal edema   Skin: Supple no rash     NeurologicExam:  Mental Status: Alert and oriented to person place and time   Speech: Fluent no aphasia or dysarthria. Cranial Nerves:  II - XII Intact   Motor:  Full and symmetric strength of upper and lower proximal and distal muscles. Normal bulk and tone. Reflexes:   Deep tendon reflexes 2+/4 and symmetric. Sensory:   Symmetric and intact with no perceived deficits modalities involving small or large fibers.    Gait:  Gait is balanced and fluid with normal arm swing.    Tremor:   No tremor noted. Cerebellar:  Coordination intact. Neurovascular: No carotid bruits. No JVD           Imaging  CT Results (most recent):  Results from Hospital Encounter encounter on 05/05/19   CTA HEAD NECK W CONT    Narrative CLINICAL HISTORY: Left cerebellar infarction    EXAMINATION:  CT ANGIOGRAPHY HEAD AND NECK     COMPARISON: MR brain 5/6/2019    TECHNIQUE:  Following the uneventful administration of iodinated contrast  material, axial CT angiography of the head and neck was performed. Delayed axial  images through the head were also obtained. Coronal and sagittal reconstructions  were obtained. Manual postprocessing of images was performed. 3-D  Sagittal  maximal intensity projection images were obtained. 3-D Coronal maximal  intensity projections were obtained. CT dose reduction was achieved through use  of a standardized protocol tailored for this examination and automatic exposure  control for dose modulation. FINDINGS:    Head CT:    Large acute left cerebellar infarction is again seen. Chronic appearing right  cerebellar and right parietal occipital infarctions with encephalomalacia are  again noted. Mild to moderate bilateral subcortical and periventricular areas of  patchy low attenuation is nonspecific but likely related to changes of chronic  small vessel ischemic disease. No midline shift. Basal cisterns are patent. Exvacuodilatation of the right posterior horn of the lateral ventricle is  unchanged. Paranasal sinuses are well aerated. CTA NECK:    Great vessels: Patent    Right subclavian artery: Patent    Left subclavian artery: Patent    Right common carotid artery: Patent    Left common carotid artery: Patent    Cervical right internal carotid artery: Patent with proximal atherosclerosis and  less than 50% stenosis by NASCET criteria. Cervical left internal carotid artery: Patent with no significant stenosis by  NASCET criteria.     Right vertebral artery: The V1 through distal V2 segments are patent with  moderate atherosclerosis. Minimal to no flow is noted in the V3 and proximal  intracranial V4 segment    Left vertebral artery: Patent with diffuse moderate atherosclerosis    Moderate cervical spondylosis    CTA HEAD:    Right cavernous internal carotid artery: Patent with moderate atherosclerosis    Left cavernous internal carotid artery: Patent with focal ectasia/fusiform  aneurysmal dilatation measuring 7 mm (series 5, image 378). There is a wide  necked carotid terminus / P-comm aneurysm measuring 12 x 7 mm (series 399). Anterior cerebral arteries: Patent with moderate atherosclerosis and focal  ectasia of the proximal right A2 segment    Anterior communicating artery: Patent    Right middle cerebral artery: Patent with moderate to severe atherosclerosis    Left middle cerebral artery: Patent with moderate to severe atherosclerosis    Posterior communicating arteries: Right is patent with 4 mm infundibulum versus  aneurysm at its origin. Left is patent and mildly ectatic    Posterior cerebral arteries: Minimal to no flow is noted in the right posterior  cerebral artery which may be chronically occluded. The left posterior cerebral  arteries patent with moderate atherosclerosis. Questionable 2 to 3 mm aneurysm  at the junction of the left P1 segment and left P-comm    Basilar artery: Very minimal flow is appreciated in the basilar artery which is  diminutive in caliber. Distal vertebral arteries: Proximal distal right vertebral artery is occluded  with minimal reconstitution just proximal to the basilar artery. The left V4  segment vertebral artery is patent but diminutive in caliber and severely  atherosclerotic. There is occlusion of the left PICA just distal to the origin. Impression Impression:    There is occlusion of the left posterior inferior cerebellar artery just distal  to the origin.  The left vertebral artery is patent with moderate cervical and  severe intracranial atherosclerosis. The basilar artery demonstrates very  minimal flow and is diminutive in caliber throughout. The distal right vertebral  artery is occluded which may be chronic. Minimal to no flow in the right  posterior cerebral artery may also be chronic. Moderate to severe intracranial atherosclerosis as described above. Questionable 2 to 3 mm aneurysm at the junction of the left P1 segment and left  P-comm    4 mm infundibulum versus aneurysm at the origin of the right posterior  communicating artery    Left cavernous internal carotid artery is patent with focal ectasia/fusiform  aneurysmal dilatation measuring 7 mm. There is a wide necked left carotid  terminus / P-comm aneurysm measuring 12 x 7 mm     Please refer to above findings for complete details          MRI Results (most recent):  Results from Hospital Encounter encounter on 05/05/19   MRA BRAIN WO CONT    Narrative INDICATION:   CVA    COMPARISON:  None    TECHNIQUE:    3-D time-of-flight MRA of the brain was performed. Multiplanar reconstructions  were obtained. FINDINGS:  There is no flow demonstrated within the distal right vertebral artery and  minimal to no flow within the distal left vertebral artery. No flow is  demonstrated within the basilar arteries. . While the internal carotid arteries  are patent. There is fusiform enlargement of the left cavernous carotid  measuring 6 mm in diameter. There is an 11 mm slightly irregular left posterior  communicating artery aneurysm. There is irregular narrowing of the left middle  cerebral artery with fusiform enlargement at its trifurcation there is narrowing  of the right cavernous carotid with fusiform enlargement of the right carotid  terminus measuring a maximum of 8 mm. There is irregular narrowing of the right  middle cerebral artery. Anterior cerebral arteries are patent.  There are  bilateral posterior communicating arteries       Impression IMPRESSION: 1. No flow is demonstrated within the right vertebral artery and local stenosis  flow is noted within the left vertebral artery. No flow is demonstrated within  the basilar artery. 2. 11 mm irregular aneurysm at the left posterior communicating artery  3. Fusiform dilatation of the left cavernous carotid  4. Marked narrowing of the right cavernous carotid with fusiform dilatation of  the distal right internal carotid artery  5.  Multifocal areas of stenosis in the middle cerebral arteries bilaterally         Lab Review  Lab Results   Component Value Date/Time    WBC 4.8 06/14/2019 06:03 PM    HCT 35.2 (L) 06/14/2019 06:03 PM    HGB 11.9 (L) 06/14/2019 06:03 PM    PLATELET 431 44/72/8022 06:03 PM       Lab Results   Component Value Date/Time    Sodium 142 06/14/2019 06:03 PM    Potassium 4.6 06/14/2019 06:03 PM    Chloride 109 (H) 06/14/2019 06:03 PM    CO2 27 06/14/2019 06:03 PM    Glucose 93 06/14/2019 06:03 PM    BUN 23 (H) 06/14/2019 06:03 PM    Creatinine 2.11 (H) 06/14/2019 06:03 PM    Calcium 9.0 06/14/2019 06:03 PM         Lab Results   Component Value Date/Time    Hemoglobin A1c 5.9 05/07/2019 03:40 AM        Lab Results   Component Value Date/Time    Vitamin B12 652 05/07/2019 03:45 AM    Folate 11.9 05/07/2019 03:45 AM         Lab Results   Component Value Date/Time    Cholesterol, total 170 05/07/2019 03:40 AM    HDL Cholesterol 44 05/07/2019 03:40 AM    LDL, calculated 112.8 (H) 05/07/2019 03:40 AM    VLDL, calculated 13.2 05/07/2019 03:40 AM    Triglyceride 66 05/07/2019 03:40 AM    CHOL/HDL Ratio 3.9 05/07/2019 03:40 AM

## 2019-06-18 NOTE — PATIENT INSTRUCTIONS

## 2020-01-01 ENCOUNTER — HOSPITAL ENCOUNTER (OUTPATIENT)
Dept: GENERAL RADIOLOGY | Age: 80
Discharge: HOME | End: 2020-08-25
Attending: NURSE PRACTITIONER
Payer: MEDICARE

## 2020-01-01 DIAGNOSIS — R13.10 DYSPHAGIA: ICD-10-CM

## 2020-01-01 PROCEDURE — 74220 X-RAY XM ESOPHAGUS 1CNTRST: CPT

## 2020-05-21 ENCOUNTER — VIRTUAL VISIT (OUTPATIENT)
Dept: NEUROLOGY | Age: 80
End: 2020-05-21

## 2020-05-21 DIAGNOSIS — I10 ESSENTIAL HYPERTENSION: ICD-10-CM

## 2020-05-21 DIAGNOSIS — G20 PARKINSON'S DISEASE (TREMOR, STIFFNESS, SLOW MOTION, UNSTABLE POSTURE) (HCC): ICD-10-CM

## 2020-05-21 DIAGNOSIS — I63.311 THROMBOTIC STROKE INVOLVING RIGHT MIDDLE CEREBRAL ARTERY (HCC): Primary | ICD-10-CM

## 2020-05-21 NOTE — PROGRESS NOTES
Name: Pau Santiago Chief Complaint: stroke Returns for follow-up. He is compliant with medications. He has no side effects. No new issues have arisen. He has had no falls. Assesment and Plan 1. 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 Parkinsons Continue Sinemet 3. Hyperlipidemia Continue pravastatin 4. Hypertension Continue amlodipine Allergies Patient has no known allergies. Medications Current Outpatient Medications Medication Sig  carbidopa-levodopa (SINEMET)  mg per tablet Take 1 Tab by mouth three (3) times daily.  aspirin-dipyridamole (AGGRENOX)  mg per SR capsule Take 1 Cap by mouth two (2) times a day.  metoprolol succinate (TOPROL-XL) 25 mg XL tablet Take 1 Tab by mouth daily.  pravastatin (PRAVACHOL) 80 mg tablet Take 1 Tab by mouth daily.  polyethylene glycol (MIRALAX) 17 gram/dose powder Take 17 g by mouth daily as needed (constipation).  calcium-cholecalciferol, D3, (CALTRATE 600+D) tablet Take 1 Tab by mouth daily.  citalopram (CELEXA) 10 mg tablet Take 10 mg by mouth every evening.  amLODIPine (NORVASC) 10 mg tablet Take 10 mg by mouth daily.  raNITIdine (ZANTAC) 150 mg tablet Take 150 mg by mouth daily.  aspirin 81 mg chewable tablet Take 81 mg by mouth daily. No current facility-administered medications for this visit. Medical History Past Medical History:  
Diagnosis Date  CAD (coronary artery disease)  Hypertension  Other ill-defined conditions(799.89)   
 parkinson's  Other ill-defined conditions(799.89)   
 high cholesterol  Stroke [facility] 2006  
 left sided weakness Review of Systems Constitutional: Negative for chills and fever. HENT: Negative for ear pain. Eyes: Negative for pain and discharge. Respiratory: Negative for cough and hemoptysis. Cardiovascular: Negative for chest pain and claudication. Gastrointestinal: Negative for constipation and diarrhea. Genitourinary: Negative for flank pain and hematuria. Musculoskeletal: Negative for back pain and myalgias. Skin: Negative for itching and rash. Neurological: Positive for weakness. Negative for headaches. Endo/Heme/Allergies: Negative for environmental allergies. Does not bruise/bleed easily. Psychiatric/Behavioral: Positive for memory loss. Negative for depression and hallucinations. Exam: 
 
General: Well developed, well nourished. Patient in no apparent distress Head: Normocephalic, atraumatic, anicteric sclera Neck Normal ROM, No thyromegally Lungs:   Normal effort Ext: No pedal edema Skin: Supple no rash NeurologicExam: 
Mental Status: Alert and oriented to person place and time Speech: Fluent no aphasia or dysarthria. Cranial Nerves:  II - XII Intact Motor:  Full and symmetric strength. Sensory:   Symmetric and intact . Gait:  Gait is balanced and fluid with normal arm swing. Tremor:   No tremor noted. Cerebellar:  Coordination intact. Neurovascular: No carotid bruits. No JVD Imaging CT Results (most recent): 
Results from Hospital Encounter encounter on 05/05/19 CTA HEAD NECK W CONT Narrative CLINICAL HISTORY: Left cerebellar infarction EXAMINATION:  CT ANGIOGRAPHY HEAD AND NECK COMPARISON: MR brain 5/6/2019 TECHNIQUE:  Following the uneventful administration of iodinated contrast 
material, axial CT angiography of the head and neck was performed. Delayed axial 
images through the head were also obtained. Coronal and sagittal reconstructions 
were obtained. Manual postprocessing of images was performed. 3-D  Sagittal 
maximal intensity projection images were obtained.   3-D Coronal maximal 
 intensity projections were obtained. CT dose reduction was achieved through use 
of a standardized protocol tailored for this examination and automatic exposure 
control for dose modulation. FINDINGS: 
 
Head CT: 
 
Large acute left cerebellar infarction is again seen. Chronic appearing right 
cerebellar and right parietal occipital infarctions with encephalomalacia are 
again noted. Mild to moderate bilateral subcortical and periventricular areas of 
patchy low attenuation is nonspecific but likely related to changes of chronic 
small vessel ischemic disease. No midline shift. Basal cisterns are patent. Exvacuodilatation of the right posterior horn of the lateral ventricle is 
unchanged. Paranasal sinuses are well aerated. CTA NECK: 
 
Great vessels: Patent Right subclavian artery: Patent Left subclavian artery: Patent Right common carotid artery: Patent Left common carotid artery: Patent Cervical right internal carotid artery: Patent with proximal atherosclerosis and 
less than 50% stenosis by NASCET criteria. Cervical left internal carotid artery: Patent with no significant stenosis by NASCET criteria. Right vertebral artery: The V1 through distal V2 segments are patent with 
moderate atherosclerosis. Minimal to no flow is noted in the V3 and proximal 
intracranial V4 segment Left vertebral artery: Patent with diffuse moderate atherosclerosis Moderate cervical spondylosis CTA HEAD: 
 
Right cavernous internal carotid artery: Patent with moderate atherosclerosis Left cavernous internal carotid artery: Patent with focal ectasia/fusiform 
aneurysmal dilatation measuring 7 mm (series 5, image 378). There is a wide 
necked carotid terminus / P-comm aneurysm measuring 12 x 7 mm (series 399). Anterior cerebral arteries: Patent with moderate atherosclerosis and focal 
ectasia of the proximal right A2 segment Anterior communicating artery: Patent Right middle cerebral artery: Patent with moderate to severe atherosclerosis Left middle cerebral artery: Patent with moderate to severe atherosclerosis Posterior communicating arteries: Right is patent with 4 mm infundibulum versus 
aneurysm at its origin. Left is patent and mildly ectatic Posterior cerebral arteries: Minimal to no flow is noted in the right posterior 
cerebral artery which may be chronically occluded. The left posterior cerebral 
arteries patent with moderate atherosclerosis. Questionable 2 to 3 mm aneurysm 
at the junction of the left P1 segment and left P-comm Basilar artery: Very minimal flow is appreciated in the basilar artery which is 
diminutive in caliber. Distal vertebral arteries: Proximal distal right vertebral artery is occluded 
with minimal reconstitution just proximal to the basilar artery. The left V4 
segment vertebral artery is patent but diminutive in caliber and severely 
atherosclerotic. There is occlusion of the left PICA just distal to the origin. Impression Impression: 
 
There is occlusion of the left posterior inferior cerebellar artery just distal 
to the origin. The left vertebral artery is patent with moderate cervical and 
severe intracranial atherosclerosis. The basilar artery demonstrates very 
minimal flow and is diminutive in caliber throughout. The distal right vertebral 
artery is occluded which may be chronic. Minimal to no flow in the right 
posterior cerebral artery may also be chronic. Moderate to severe intracranial atherosclerosis as described above. Questionable 2 to 3 mm aneurysm at the junction of the left P1 segment and left P-comm 4 mm infundibulum versus aneurysm at the origin of the right posterior 
communicating artery Left cavernous internal carotid artery is patent with focal ectasia/fusiform 
aneurysmal dilatation measuring 7 mm. There is a wide necked left carotid terminus / P-comm aneurysm measuring 12 x 7 mm Please refer to above findings for complete details MRI Results (most recent): 
Results from Hospital Encounter encounter on 05/05/19 MRA BRAIN WO CONT Narrative INDICATION:   CVA COMPARISON:  None TECHNIQUE:   
3-D time-of-flight MRA of the brain was performed. Multiplanar reconstructions 
were obtained. FINDINGS: 
There is no flow demonstrated within the distal right vertebral artery and 
minimal to no flow within the distal left vertebral artery. No flow is 
demonstrated within the basilar arteries. . While the internal carotid arteries 
are patent. There is fusiform enlargement of the left cavernous carotid 
measuring 6 mm in diameter. There is an 11 mm slightly irregular left posterior 
communicating artery aneurysm. There is irregular narrowing of the left middle 
cerebral artery with fusiform enlargement at its trifurcation there is narrowing 
of the right cavernous carotid with fusiform enlargement of the right carotid 
terminus measuring a maximum of 8 mm. There is irregular narrowing of the right 
middle cerebral artery. Anterior cerebral arteries are patent. There are 
bilateral posterior communicating arteries Impression IMPRESSION:   
1. No flow is demonstrated within the right vertebral artery and local stenosis 
flow is noted within the left vertebral artery. No flow is demonstrated within 
the basilar artery. 2. 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 Fusiform dilatation of the left cavernous carotid 4. Marked narrowing of the right cavernous carotid with fusiform dilatation of 
the distal right internal carotid artery 5. Multifocal areas of stenosis in the middle cerebral arteries bilaterally Lab Review Lab Results Component Value Date/Time  WBC 4.8 06/14/2019 06:03 PM  
 HCT 35.2 (L) 06/14/2019 06:03 PM  
 HGB 11.9 (L) 06/14/2019 06:03 PM  
 PLATELET 799 01/95/4195 06:03 PM  
 
 
Lab Results Component Value Date/Time Sodium 142 06/14/2019 06:03 PM  
 Potassium 4.6 06/14/2019 06:03 PM  
 Chloride 109 (H) 06/14/2019 06:03 PM  
 CO2 27 06/14/2019 06:03 PM  
 Glucose 93 06/14/2019 06:03 PM  
 BUN 23 (H) 06/14/2019 06:03 PM  
 Creatinine 2.11 (H) 06/14/2019 06:03 PM  
 Calcium 9.0 06/14/2019 06:03 PM  
 
 
 
Lab Results Component Value Date/Time Hemoglobin A1c 5.9 05/07/2019 03:40 AM  
  
 
Lab Results Component Value Date/Time Vitamin B12 652 05/07/2019 03:45 AM  
 Folate 11.9 05/07/2019 03:45 AM  
 
 
 
Lab Results Component Value Date/Time Cholesterol, total 170 05/07/2019 03:40 AM  
 HDL Cholesterol 44 05/07/2019 03:40 AM  
 LDL, calculated 112.8 (H) 05/07/2019 03:40 AM  
 VLDL, calculated 13.2 05/07/2019 03:40 AM  
 Triglyceride 66 05/07/2019 03:40 AM  
 CHOL/HDL Ratio 3.9 05/07/2019 03:40 AM  
 
Xiang Moore was seen by synchronous (real-time) audio-video technology on 05/27/20. Consent: 
He and/or his healthcare decision maker is aware that this patient-initiated Telehealth encounter is a billable service, with coverage as determined by his insurance carrier. He is aware that he may receive a bill and has provided verbal consent to proceed: Yes I was in the office while conducting this encounter. Pursuant to the emergency declaration under the [facility] waiver authority and the [employer] Act, this Virtual  Visit was conducted, with patient's consent, to reduce the patient's risk of exposure to COVID-19 and provide continuity of care for an established patient. Services were provided through a video synchronous discussion virtually to substitute for in-person clinic visit.

## 2020-05-21 NOTE — PATIENT INSTRUCTIONS

## 2021-01-01 ENCOUNTER — ANESTHESIA EVENT (OUTPATIENT)
Dept: SURGERY | Age: 81
DRG: 482 | End: 2021-01-01
Payer: MEDICARE

## 2021-01-01 ENCOUNTER — HOME CARE VISIT (OUTPATIENT)
Dept: HOSPICE | Facility: HOSPICE | Age: 81
End: 2021-01-01
Payer: MEDICARE

## 2021-01-01 ENCOUNTER — APPOINTMENT (OUTPATIENT)
Dept: MRI IMAGING | Age: 81
DRG: 871 | End: 2021-01-01
Attending: STUDENT IN AN ORGANIZED HEALTH CARE EDUCATION/TRAINING PROGRAM
Payer: MEDICARE

## 2021-01-01 ENCOUNTER — HOSPITAL ENCOUNTER (INPATIENT)
Age: 81
LOS: 5 days | Discharge: NURSING, REHAB OR LONG-TERM CARE | DRG: 482 | End: 2021-03-30
Attending: EMERGENCY MEDICINE | Admitting: GENERAL ACUTE CARE HOSPITAL
Payer: MEDICARE

## 2021-01-01 ENCOUNTER — HOME CARE VISIT (OUTPATIENT)
Dept: SCHEDULING | Facility: HOME HEALTH | Age: 81
End: 2021-01-01
Payer: MEDICARE

## 2021-01-01 ENCOUNTER — APPOINTMENT (OUTPATIENT)
Dept: GENERAL RADIOLOGY | Age: 81
DRG: 482 | End: 2021-01-01
Attending: EMERGENCY MEDICINE
Payer: MEDICARE

## 2021-01-01 ENCOUNTER — APPOINTMENT (OUTPATIENT)
Dept: GENERAL RADIOLOGY | Age: 81
DRG: 871 | End: 2021-01-01
Attending: INTERNAL MEDICINE
Payer: MEDICARE

## 2021-01-01 ENCOUNTER — APPOINTMENT (OUTPATIENT)
Dept: CT IMAGING | Age: 81
DRG: 871 | End: 2021-01-01
Attending: EMERGENCY MEDICINE
Payer: MEDICARE

## 2021-01-01 ENCOUNTER — APPOINTMENT (OUTPATIENT)
Dept: NON INVASIVE DIAGNOSTICS | Age: 81
DRG: 871 | End: 2021-01-01
Attending: STUDENT IN AN ORGANIZED HEALTH CARE EDUCATION/TRAINING PROGRAM
Payer: MEDICARE

## 2021-01-01 ENCOUNTER — APPOINTMENT (OUTPATIENT)
Dept: GENERAL RADIOLOGY | Age: 81
DRG: 482 | End: 2021-01-01
Attending: ORTHOPAEDIC SURGERY
Payer: MEDICARE

## 2021-01-01 ENCOUNTER — HOSPICE ADMISSION (OUTPATIENT)
Dept: HOSPICE | Facility: HOSPICE | Age: 81
End: 2021-01-01
Payer: MEDICARE

## 2021-01-01 ENCOUNTER — ANESTHESIA (OUTPATIENT)
Dept: SURGERY | Age: 81
DRG: 482 | End: 2021-01-01
Payer: MEDICARE

## 2021-01-01 ENCOUNTER — APPOINTMENT (OUTPATIENT)
Dept: GENERAL RADIOLOGY | Age: 81
DRG: 871 | End: 2021-01-01
Attending: EMERGENCY MEDICINE
Payer: MEDICARE

## 2021-01-01 ENCOUNTER — DOCUMENTATION ONLY (OUTPATIENT)
Dept: ORTHOPEDIC SURGERY | Age: 81
End: 2021-01-01

## 2021-01-01 ENCOUNTER — HOSPITAL ENCOUNTER (INPATIENT)
Age: 81
LOS: 9 days | Discharge: HOSPICE | DRG: 871 | End: 2021-06-02
Attending: EMERGENCY MEDICINE | Admitting: STUDENT IN AN ORGANIZED HEALTH CARE EDUCATION/TRAINING PROGRAM
Payer: MEDICARE

## 2021-01-01 VITALS
TEMPERATURE: 98.1 F | WEIGHT: 169.1 LBS | HEART RATE: 68 BPM | RESPIRATION RATE: 18 BRPM | SYSTOLIC BLOOD PRESSURE: 149 MMHG | DIASTOLIC BLOOD PRESSURE: 64 MMHG | BODY MASS INDEX: 21.02 KG/M2 | OXYGEN SATURATION: 100 % | HEIGHT: 75 IN

## 2021-01-01 VITALS
SYSTOLIC BLOOD PRESSURE: 104 MMHG | OXYGEN SATURATION: 93 % | RESPIRATION RATE: 28 BRPM | HEART RATE: 125 BPM | DIASTOLIC BLOOD PRESSURE: 56 MMHG | TEMPERATURE: 99.7 F

## 2021-01-01 VITALS
DIASTOLIC BLOOD PRESSURE: 90 MMHG | OXYGEN SATURATION: 97 % | SYSTOLIC BLOOD PRESSURE: 186 MMHG | HEIGHT: 75 IN | RESPIRATION RATE: 16 BRPM | HEART RATE: 67 BPM | BODY MASS INDEX: 17.49 KG/M2 | WEIGHT: 140.65 LBS | TEMPERATURE: 99.4 F

## 2021-01-01 VITALS
SYSTOLIC BLOOD PRESSURE: 140 MMHG | HEART RATE: 66 BPM | OXYGEN SATURATION: 98 % | DIASTOLIC BLOOD PRESSURE: 70 MMHG | RESPIRATION RATE: 8 BRPM

## 2021-01-01 VITALS
OXYGEN SATURATION: 98 % | HEIGHT: 75 IN | HEART RATE: 78 BPM | BODY MASS INDEX: 20.39 KG/M2 | WEIGHT: 164 LBS | SYSTOLIC BLOOD PRESSURE: 140 MMHG | DIASTOLIC BLOOD PRESSURE: 85 MMHG | RESPIRATION RATE: 20 BRPM

## 2021-01-01 DIAGNOSIS — S72.001A CLOSED FRACTURE OF RIGHT HIP, INITIAL ENCOUNTER (HCC): ICD-10-CM

## 2021-01-01 DIAGNOSIS — Z71.89 GOALS OF CARE, COUNSELING/DISCUSSION: ICD-10-CM

## 2021-01-01 DIAGNOSIS — I47.20 VENTRICULAR TACHYCARDIA: ICD-10-CM

## 2021-01-01 DIAGNOSIS — R53.81 DEBILITY: ICD-10-CM

## 2021-01-01 DIAGNOSIS — G93.40 ACUTE ENCEPHALOPATHY: Primary | ICD-10-CM

## 2021-01-01 DIAGNOSIS — S72.002A CLOSED FRACTURE OF LEFT HIP, INITIAL ENCOUNTER (HCC): Primary | ICD-10-CM

## 2021-01-01 DIAGNOSIS — Z71.89 DNR (DO NOT RESUSCITATE) DISCUSSION: ICD-10-CM

## 2021-01-01 DIAGNOSIS — Z47.89 ORTHOPEDIC AFTERCARE: Primary | ICD-10-CM

## 2021-01-01 DIAGNOSIS — M25.552 PAIN IN LEFT HIP: ICD-10-CM

## 2021-01-01 LAB
ABO + RH BLD: NORMAL
ALBUMIN SERPL-MCNC: 1.8 G/DL (ref 3.5–5)
ALBUMIN SERPL-MCNC: 3.3 G/DL (ref 3.5–5)
ALBUMIN SERPL-MCNC: 4.1 G/DL (ref 3.5–5)
ALBUMIN/GLOB SERPL: 0.6 {RATIO} (ref 1.1–2.2)
ALBUMIN/GLOB SERPL: 0.9 {RATIO} (ref 1.1–2.2)
ALBUMIN/GLOB SERPL: 1 {RATIO} (ref 1.1–2.2)
ALP SERPL-CCNC: 124 U/L (ref 45–117)
ALP SERPL-CCNC: 70 U/L (ref 45–117)
ALP SERPL-CCNC: 99 U/L (ref 45–117)
ALT SERPL-CCNC: 34 U/L (ref 12–78)
ALT SERPL-CCNC: 8 U/L (ref 12–78)
ALT SERPL-CCNC: <6 U/L (ref 12–78)
AMORPH CRY URNS QL MICRO: ABNORMAL
ANION GAP SERPL CALC-SCNC: 3 MMOL/L (ref 5–15)
ANION GAP SERPL CALC-SCNC: 3 MMOL/L (ref 5–15)
ANION GAP SERPL CALC-SCNC: 5 MMOL/L (ref 5–15)
ANION GAP SERPL CALC-SCNC: 6 MMOL/L (ref 5–15)
ANION GAP SERPL CALC-SCNC: 7 MMOL/L (ref 5–15)
APPEARANCE UR: ABNORMAL
AST SERPL-CCNC: 14 U/L (ref 15–37)
AST SERPL-CCNC: 20 U/L (ref 15–37)
AST SERPL-CCNC: 21 U/L (ref 15–37)
ATRIAL RATE: 77 BPM
ATRIAL RATE: 86 BPM
AV R PG: 48.09 MMHG
BACTERIA SPEC CULT: NORMAL
BACTERIA URNS QL MICRO: NEGATIVE /HPF
BASOPHILS # BLD: 0 K/UL (ref 0–0.1)
BASOPHILS # BLD: 0.1 K/UL (ref 0–0.1)
BASOPHILS NFR BLD: 0 % (ref 0–1)
BASOPHILS NFR BLD: 1 % (ref 0–1)
BILIRUB SERPL-MCNC: 0.8 MG/DL (ref 0.2–1)
BILIRUB SERPL-MCNC: 0.8 MG/DL (ref 0.2–1)
BILIRUB SERPL-MCNC: 0.9 MG/DL (ref 0.2–1)
BILIRUB UR QL CFM: NEGATIVE
BLOOD GROUP ANTIBODIES SERPL: NORMAL
BNP SERPL-MCNC: 1209 PG/ML
BUN SERPL-MCNC: 21 MG/DL (ref 6–20)
BUN SERPL-MCNC: 21 MG/DL (ref 6–20)
BUN SERPL-MCNC: 22 MG/DL (ref 6–20)
BUN SERPL-MCNC: 24 MG/DL (ref 6–20)
BUN SERPL-MCNC: 24 MG/DL (ref 6–20)
BUN SERPL-MCNC: 28 MG/DL (ref 6–20)
BUN SERPL-MCNC: 30 MG/DL (ref 6–20)
BUN/CREAT SERPL: 11 (ref 12–20)
BUN/CREAT SERPL: 11 (ref 12–20)
BUN/CREAT SERPL: 12 (ref 12–20)
BUN/CREAT SERPL: 13 (ref 12–20)
BUN/CREAT SERPL: 14 (ref 12–20)
BUN/CREAT SERPL: 16 (ref 12–20)
BUN/CREAT SERPL: 17 (ref 12–20)
CALCIUM SERPL-MCNC: 6.7 MG/DL (ref 8.5–10.1)
CALCIUM SERPL-MCNC: 7.8 MG/DL (ref 8.5–10.1)
CALCIUM SERPL-MCNC: 8.5 MG/DL (ref 8.5–10.1)
CALCIUM SERPL-MCNC: 8.6 MG/DL (ref 8.5–10.1)
CALCIUM SERPL-MCNC: 9 MG/DL (ref 8.5–10.1)
CALCIUM SERPL-MCNC: 9.2 MG/DL (ref 8.5–10.1)
CALCIUM SERPL-MCNC: 9.9 MG/DL (ref 8.5–10.1)
CALCULATED P AXIS, ECG09: 30 DEGREES
CALCULATED R AXIS, ECG10: -4 DEGREES
CALCULATED R AXIS, ECG10: 21 DEGREES
CALCULATED T AXIS, ECG11: 146 DEGREES
CALCULATED T AXIS, ECG11: 71 DEGREES
CHLORIDE SERPL-SCNC: 106 MMOL/L (ref 97–108)
CHLORIDE SERPL-SCNC: 106 MMOL/L (ref 97–108)
CHLORIDE SERPL-SCNC: 107 MMOL/L (ref 97–108)
CHLORIDE SERPL-SCNC: 108 MMOL/L (ref 97–108)
CHLORIDE SERPL-SCNC: 110 MMOL/L (ref 97–108)
CHLORIDE SERPL-SCNC: 112 MMOL/L (ref 97–108)
CHLORIDE SERPL-SCNC: 121 MMOL/L (ref 97–108)
CO2 SERPL-SCNC: 22 MMOL/L (ref 21–32)
CO2 SERPL-SCNC: 24 MMOL/L (ref 21–32)
CO2 SERPL-SCNC: 26 MMOL/L (ref 21–32)
CO2 SERPL-SCNC: 26 MMOL/L (ref 21–32)
CO2 SERPL-SCNC: 27 MMOL/L (ref 21–32)
COLOR UR: ABNORMAL
COVID-19 RAPID TEST, COVR: NOT DETECTED
CREAT SERPL-MCNC: 1.48 MG/DL (ref 0.7–1.3)
CREAT SERPL-MCNC: 1.67 MG/DL (ref 0.7–1.3)
CREAT SERPL-MCNC: 1.69 MG/DL (ref 0.7–1.3)
CREAT SERPL-MCNC: 1.81 MG/DL (ref 0.7–1.3)
CREAT SERPL-MCNC: 1.89 MG/DL (ref 0.7–1.3)
CREAT SERPL-MCNC: 2.01 MG/DL (ref 0.7–1.3)
CREAT SERPL-MCNC: 2.14 MG/DL (ref 0.7–1.3)
DIAGNOSIS, 93000: NORMAL
DIAGNOSIS, 93000: NORMAL
DIFFERENTIAL METHOD BLD: ABNORMAL
DIFFERENTIAL METHOD BLD: ABNORMAL
ECHO AR MAX VEL PISA: 346.62 CM/S
ECHO AV PEAK GRADIENT: 5.79 MMHG
ECHO AV PEAK VELOCITY: 120.34 CM/S
ECHO AV REGURGITANT PHT: 1380.38 MS
ECHO LA AREA 4C: 16.39 CM2
ECHO LA MAJOR AXIS: 3.61 CM
ECHO LA MINOR AXIS: 1.76 CM
ECHO LA VOL 4C: 47.21 ML (ref 18–58)
ECHO LA VOLUME INDEX A4C: 23.03 ML/M2 (ref 16–28)
ECHO LV EDV A4C: 106.56 ML
ECHO LV EDV INDEX A4C: 52 ML/M2
ECHO LV EJECTION FRACTION A4C: 42 PERCENT
ECHO LV ESV A4C: 61.82 ML
ECHO LV ESV INDEX A4C: 30.2 ML/M2
ECHO LV INTERNAL DIMENSION DIASTOLIC: 4.92 CM (ref 4.2–5.9)
ECHO LV INTERNAL DIMENSION SYSTOLIC: 3.94 CM
ECHO LV IVSD: 1.15 CM (ref 0.6–1)
ECHO LV MASS 2D: 212.1 G (ref 88–224)
ECHO LV MASS INDEX 2D: 103.4 G/M2 (ref 49–115)
ECHO LV POSTERIOR WALL DIASTOLIC: 1.13 CM (ref 0.6–1)
ECHO LVOT PEAK GRADIENT: 1.89 MMHG
ECHO LVOT PEAK VELOCITY: 68.75 CM/S
ECHO MV REGURGITANT VTIA: 71.72 CM
ECHO RV INTERNAL DIMENSION: 3.25 CM
ECHO TV MEAN GRADIENT: 16.37 MMHG
ECHO TV REGURGITANT MAX VELOCITY: 279.59 CM/S
EOSINOPHIL # BLD: 0 K/UL (ref 0–0.4)
EOSINOPHIL # BLD: 0 K/UL (ref 0–0.4)
EOSINOPHIL NFR BLD: 0 % (ref 0–7)
EOSINOPHIL NFR BLD: 0 % (ref 0–7)
EPITH CASTS URNS QL MICRO: ABNORMAL /LPF
ERYTHROCYTE [DISTWIDTH] IN BLOOD BY AUTOMATED COUNT: 12.9 % (ref 11.5–14.5)
ERYTHROCYTE [DISTWIDTH] IN BLOOD BY AUTOMATED COUNT: 12.9 % (ref 11.5–14.5)
ERYTHROCYTE [DISTWIDTH] IN BLOOD BY AUTOMATED COUNT: 13.1 % (ref 11.5–14.5)
ERYTHROCYTE [DISTWIDTH] IN BLOOD BY AUTOMATED COUNT: 13.2 % (ref 11.5–14.5)
ERYTHROCYTE [DISTWIDTH] IN BLOOD BY AUTOMATED COUNT: 13.2 % (ref 11.5–14.5)
ERYTHROCYTE [DISTWIDTH] IN BLOOD BY AUTOMATED COUNT: 13.3 % (ref 11.5–14.5)
FLUAV RNA SPEC QL NAA+PROBE: NOT DETECTED
FLUBV RNA SPEC QL NAA+PROBE: NOT DETECTED
GLOBULIN SER CALC-MCNC: 3.1 G/DL (ref 2–4)
GLOBULIN SER CALC-MCNC: 3.7 G/DL (ref 2–4)
GLOBULIN SER CALC-MCNC: 4.2 G/DL (ref 2–4)
GLUCOSE BLD STRIP.AUTO-MCNC: 106 MG/DL (ref 65–117)
GLUCOSE BLD STRIP.AUTO-MCNC: 106 MG/DL (ref 65–117)
GLUCOSE SERPL-MCNC: 107 MG/DL (ref 65–100)
GLUCOSE SERPL-MCNC: 115 MG/DL (ref 65–100)
GLUCOSE SERPL-MCNC: 126 MG/DL (ref 65–100)
GLUCOSE SERPL-MCNC: 128 MG/DL (ref 65–100)
GLUCOSE SERPL-MCNC: 83 MG/DL (ref 65–100)
GLUCOSE SERPL-MCNC: 90 MG/DL (ref 65–100)
GLUCOSE SERPL-MCNC: 97 MG/DL (ref 65–100)
GLUCOSE UR STRIP.AUTO-MCNC: NEGATIVE MG/DL
GRAN CASTS URNS QL MICRO: ABNORMAL /LPF
HCT VFR BLD AUTO: 29.5 % (ref 36.6–50.3)
HCT VFR BLD AUTO: 31.1 % (ref 36.6–50.3)
HCT VFR BLD AUTO: 33 % (ref 36.6–50.3)
HCT VFR BLD AUTO: 33.1 % (ref 36.6–50.3)
HCT VFR BLD AUTO: 35.7 % (ref 36.6–50.3)
HCT VFR BLD AUTO: 40.6 % (ref 36.6–50.3)
HGB BLD-MCNC: 10.4 G/DL (ref 12.1–17)
HGB BLD-MCNC: 10.6 G/DL (ref 12.1–17)
HGB BLD-MCNC: 11.1 G/DL (ref 12.1–17)
HGB BLD-MCNC: 12.1 G/DL (ref 12.1–17)
HGB BLD-MCNC: 13.5 G/DL (ref 12.1–17)
HGB BLD-MCNC: 9.8 G/DL (ref 12.1–17)
HGB UR QL STRIP: ABNORMAL
HYALINE CASTS URNS QL MICRO: ABNORMAL /LPF (ref 0–5)
IMM GRANULOCYTES # BLD AUTO: 0 K/UL (ref 0–0.04)
IMM GRANULOCYTES # BLD AUTO: 0 K/UL (ref 0–0.04)
IMM GRANULOCYTES NFR BLD AUTO: 0 % (ref 0–0.5)
IMM GRANULOCYTES NFR BLD AUTO: 0 % (ref 0–0.5)
INR PPP: 1 (ref 0.9–1.1)
KETONES UR QL STRIP.AUTO: ABNORMAL MG/DL
LACTATE SERPL-SCNC: 1.8 MMOL/L (ref 0.4–2)
LEUKOCYTE ESTERASE UR QL STRIP.AUTO: NEGATIVE
LYMPHOCYTES # BLD: 0.5 K/UL (ref 0.8–3.5)
LYMPHOCYTES # BLD: 0.6 K/UL (ref 0.8–3.5)
LYMPHOCYTES NFR BLD: 6 % (ref 12–49)
LYMPHOCYTES NFR BLD: 6 % (ref 12–49)
MAGNESIUM SERPL-MCNC: 1.7 MG/DL (ref 1.6–2.4)
MAGNESIUM SERPL-MCNC: 2.5 MG/DL (ref 1.6–2.4)
MAGNESIUM SERPL-MCNC: 3.4 MG/DL (ref 1.6–2.4)
MCH RBC QN AUTO: 30.3 PG (ref 26–34)
MCH RBC QN AUTO: 30.3 PG (ref 26–34)
MCH RBC QN AUTO: 30.4 PG (ref 26–34)
MCH RBC QN AUTO: 30.4 PG (ref 26–34)
MCH RBC QN AUTO: 30.5 PG (ref 26–34)
MCH RBC QN AUTO: 30.6 PG (ref 26–34)
MCHC RBC AUTO-ENTMCNC: 32.1 G/DL (ref 30–36.5)
MCHC RBC AUTO-ENTMCNC: 33.2 G/DL (ref 30–36.5)
MCHC RBC AUTO-ENTMCNC: 33.3 G/DL (ref 30–36.5)
MCHC RBC AUTO-ENTMCNC: 33.4 G/DL (ref 30–36.5)
MCHC RBC AUTO-ENTMCNC: 33.5 G/DL (ref 30–36.5)
MCHC RBC AUTO-ENTMCNC: 33.9 G/DL (ref 30–36.5)
MCV RBC AUTO: 90.4 FL (ref 80–99)
MCV RBC AUTO: 90.4 FL (ref 80–99)
MCV RBC AUTO: 91 FL (ref 80–99)
MCV RBC AUTO: 91.2 FL (ref 80–99)
MCV RBC AUTO: 91.6 FL (ref 80–99)
MCV RBC AUTO: 94.6 FL (ref 80–99)
METAMYELOCYTES NFR BLD MANUAL: 1 %
MONOCYTES # BLD: 0.5 K/UL (ref 0–1)
MONOCYTES # BLD: 0.6 K/UL (ref 0–1)
MONOCYTES NFR BLD: 6 % (ref 5–13)
MONOCYTES NFR BLD: 6 % (ref 5–13)
NEUTS BAND NFR BLD MANUAL: 9 %
NEUTS SEG # BLD: 6.9 K/UL (ref 1.8–8)
NEUTS SEG # BLD: 8.8 K/UL (ref 1.8–8)
NEUTS SEG NFR BLD: 77 % (ref 32–75)
NEUTS SEG NFR BLD: 88 % (ref 32–75)
NITRITE UR QL STRIP.AUTO: NEGATIVE
NRBC # BLD: 0 K/UL (ref 0–0.01)
NRBC BLD-RTO: 0 PER 100 WBC
P-R INTERVAL, ECG05: 160 MS
PH UR STRIP: 5.5 [PH] (ref 5–8)
PLATELET # BLD AUTO: 145 K/UL (ref 150–400)
PLATELET # BLD AUTO: 166 K/UL (ref 150–400)
PLATELET # BLD AUTO: 175 K/UL (ref 150–400)
PLATELET # BLD AUTO: 176 K/UL (ref 150–400)
PLATELET # BLD AUTO: 190 K/UL (ref 150–400)
PLATELET # BLD AUTO: 196 K/UL (ref 150–400)
PMV BLD AUTO: 10.3 FL (ref 8.9–12.9)
PMV BLD AUTO: 10.3 FL (ref 8.9–12.9)
PMV BLD AUTO: 10.5 FL (ref 8.9–12.9)
PMV BLD AUTO: 10.7 FL (ref 8.9–12.9)
PMV BLD AUTO: 10.8 FL (ref 8.9–12.9)
PMV BLD AUTO: 11 FL (ref 8.9–12.9)
POTASSIUM SERPL-SCNC: 3.7 MMOL/L (ref 3.5–5.1)
POTASSIUM SERPL-SCNC: 4.1 MMOL/L (ref 3.5–5.1)
POTASSIUM SERPL-SCNC: 4.1 MMOL/L (ref 3.5–5.1)
POTASSIUM SERPL-SCNC: 4.2 MMOL/L (ref 3.5–5.1)
POTASSIUM SERPL-SCNC: 4.2 MMOL/L (ref 3.5–5.1)
POTASSIUM SERPL-SCNC: 4.3 MMOL/L (ref 3.5–5.1)
POTASSIUM SERPL-SCNC: 4.9 MMOL/L (ref 3.5–5.1)
PROCALCITONIN SERPL-MCNC: 17.37 NG/ML
PROT SERPL-MCNC: 4.9 G/DL (ref 6.4–8.2)
PROT SERPL-MCNC: 7 G/DL (ref 6.4–8.2)
PROT SERPL-MCNC: 8.3 G/DL (ref 6.4–8.2)
PROT UR STRIP-MCNC: 100 MG/DL
PROTHROMBIN TIME: 10.9 SEC (ref 9–11.1)
Q-T INTERVAL, ECG07: 360 MS
Q-T INTERVAL, ECG07: 386 MS
QRS DURATION, ECG06: 84 MS
QRS DURATION, ECG06: 86 MS
QTC CALCULATION (BEZET), ECG08: 430 MS
QTC CALCULATION (BEZET), ECG08: 464 MS
RBC # BLD AUTO: 3.22 M/UL (ref 4.1–5.7)
RBC # BLD AUTO: 3.41 M/UL (ref 4.1–5.7)
RBC # BLD AUTO: 3.49 M/UL (ref 4.1–5.7)
RBC # BLD AUTO: 3.66 M/UL (ref 4.1–5.7)
RBC # BLD AUTO: 3.95 M/UL (ref 4.1–5.7)
RBC # BLD AUTO: 4.46 M/UL (ref 4.1–5.7)
RBC #/AREA URNS HPF: ABNORMAL /HPF (ref 0–5)
RBC MORPH BLD: ABNORMAL
RBC MORPH BLD: ABNORMAL
SARS-COV-2, COV2: NORMAL
SARS-COV-2, COV2: NOT DETECTED
SARS-COV-2, XPLCVT: NOT DETECTED
SERVICE CMNT-IMP: NORMAL
SODIUM SERPL-SCNC: 136 MMOL/L (ref 136–145)
SODIUM SERPL-SCNC: 137 MMOL/L (ref 136–145)
SODIUM SERPL-SCNC: 138 MMOL/L (ref 136–145)
SODIUM SERPL-SCNC: 139 MMOL/L (ref 136–145)
SODIUM SERPL-SCNC: 139 MMOL/L (ref 136–145)
SODIUM SERPL-SCNC: 140 MMOL/L (ref 136–145)
SODIUM SERPL-SCNC: 148 MMOL/L (ref 136–145)
SOURCE, COVRS: NORMAL
SOURCE, COVRS: NORMAL
SP GR UR REFRACTOMETRY: 1.02 (ref 1–1.03)
SPECIMEN EXP DATE BLD: NORMAL
TROPONIN I SERPL-MCNC: 0.05 NG/ML
UROBILINOGEN UR QL STRIP.AUTO: 2 EU/DL (ref 0.2–1)
VENTRICULAR RATE, ECG03: 86 BPM
VENTRICULAR RATE, ECG03: 87 BPM
WBC # BLD AUTO: 10 K/UL (ref 4.1–11.1)
WBC # BLD AUTO: 6.2 K/UL (ref 4.1–11.1)
WBC # BLD AUTO: 7.3 K/UL (ref 4.1–11.1)
WBC # BLD AUTO: 8 K/UL (ref 4.1–11.1)
WBC # BLD AUTO: 8.1 K/UL (ref 4.1–11.1)
WBC # BLD AUTO: 8.7 K/UL (ref 4.1–11.1)
WBC URNS QL MICRO: ABNORMAL /HPF (ref 0–4)

## 2021-01-01 PROCEDURE — 85025 COMPLETE CBC W/AUTO DIFF WBC: CPT

## 2021-01-01 PROCEDURE — 74011250637 HC RX REV CODE- 250/637: Performed by: INTERNAL MEDICINE

## 2021-01-01 PROCEDURE — 74011000258 HC RX REV CODE- 258: Performed by: EMERGENCY MEDICINE

## 2021-01-01 PROCEDURE — 0651 HSPC ROUTINE HOME CARE

## 2021-01-01 PROCEDURE — 94760 N-INVAS EAR/PLS OXIMETRY 1: CPT

## 2021-01-01 PROCEDURE — 94762 N-INVAS EAR/PLS OXIMTRY CONT: CPT

## 2021-01-01 PROCEDURE — 80048 BASIC METABOLIC PNL TOTAL CA: CPT

## 2021-01-01 PROCEDURE — 36415 COLL VENOUS BLD VENIPUNCTURE: CPT

## 2021-01-01 PROCEDURE — 74011250636 HC RX REV CODE- 250/636: Performed by: STUDENT IN AN ORGANIZED HEALTH CARE EDUCATION/TRAINING PROGRAM

## 2021-01-01 PROCEDURE — 80053 COMPREHEN METABOLIC PANEL: CPT

## 2021-01-01 PROCEDURE — 74011250636 HC RX REV CODE- 250/636: Performed by: INTERNAL MEDICINE

## 2021-01-01 PROCEDURE — 77030031139 HC SUT VCRL2 J&J -A: Performed by: ORTHOPAEDIC SURGERY

## 2021-01-01 PROCEDURE — 77030036660

## 2021-01-01 PROCEDURE — 74011250636 HC RX REV CODE- 250/636: Performed by: PHYSICIAN ASSISTANT

## 2021-01-01 PROCEDURE — 74011250637 HC RX REV CODE- 250/637: Performed by: STUDENT IN AN ORGANIZED HEALTH CARE EDUCATION/TRAINING PROGRAM

## 2021-01-01 PROCEDURE — 77030020788: Performed by: ORTHOPAEDIC SURGERY

## 2021-01-01 PROCEDURE — 74011250637 HC RX REV CODE- 250/637: Performed by: PHYSICIAN ASSISTANT

## 2021-01-01 PROCEDURE — 74011250636 HC RX REV CODE- 250/636: Performed by: NURSE ANESTHETIST, CERTIFIED REGISTERED

## 2021-01-01 PROCEDURE — 74011000250 HC RX REV CODE- 250: Performed by: ORTHOPAEDIC SURGERY

## 2021-01-01 PROCEDURE — 77030016474 HC BIT DRL QC3 SYNT -C: Performed by: ORTHOPAEDIC SURGERY

## 2021-01-01 PROCEDURE — 74011250636 HC RX REV CODE- 250/636: Performed by: EMERGENCY MEDICINE

## 2021-01-01 PROCEDURE — 83880 ASSAY OF NATRIURETIC PEPTIDE: CPT

## 2021-01-01 PROCEDURE — 97530 THERAPEUTIC ACTIVITIES: CPT | Performed by: PHYSICAL THERAPIST

## 2021-01-01 PROCEDURE — 2709999900 HC NON-CHARGEABLE SUPPLY

## 2021-01-01 PROCEDURE — 74011000258 HC RX REV CODE- 258: Performed by: INTERNAL MEDICINE

## 2021-01-01 PROCEDURE — 74011250637 HC RX REV CODE- 250/637: Performed by: ORTHOPAEDIC SURGERY

## 2021-01-01 PROCEDURE — 85027 COMPLETE CBC AUTOMATED: CPT

## 2021-01-01 PROCEDURE — 74011250637 HC RX REV CODE- 250/637: Performed by: GENERAL ACUTE CARE HOSPITAL

## 2021-01-01 PROCEDURE — 83605 ASSAY OF LACTIC ACID: CPT

## 2021-01-01 PROCEDURE — G0156 HHCP-SVS OF AIDE,EA 15 MIN: HCPCS

## 2021-01-01 PROCEDURE — G0300 HHS/HOSPICE OF LPN EA 15 MIN: HCPCS

## 2021-01-01 PROCEDURE — 97530 THERAPEUTIC ACTIVITIES: CPT

## 2021-01-01 PROCEDURE — 77030021678 HC GLIDESCP STAT DISP VERT -B: Performed by: ANESTHESIOLOGY

## 2021-01-01 PROCEDURE — 74011250636 HC RX REV CODE- 250/636: Performed by: ANESTHESIOLOGY

## 2021-01-01 PROCEDURE — 77030040393 HC DRSG OPTIFOAM GENT MDII -B

## 2021-01-01 PROCEDURE — 65660000000 HC RM CCU STEPDOWN

## 2021-01-01 PROCEDURE — 76010000149 HC OR TIME 1 TO 1.5 HR: Performed by: ORTHOPAEDIC SURGERY

## 2021-01-01 PROCEDURE — 99223 1ST HOSP IP/OBS HIGH 75: CPT | Performed by: INTERNAL MEDICINE

## 2021-01-01 PROCEDURE — 74011250636 HC RX REV CODE- 250/636: Performed by: NURSE PRACTITIONER

## 2021-01-01 PROCEDURE — 96365 THER/PROPH/DIAG IV INF INIT: CPT

## 2021-01-01 PROCEDURE — G0299 HHS/HOSPICE OF RN EA 15 MIN: HCPCS

## 2021-01-01 PROCEDURE — 99233 SBSQ HOSP IP/OBS HIGH 50: CPT | Performed by: INTERNAL MEDICINE

## 2021-01-01 PROCEDURE — 81001 URINALYSIS AUTO W/SCOPE: CPT

## 2021-01-01 PROCEDURE — 74011250636 HC RX REV CODE- 250/636: Performed by: ORTHOPAEDIC SURGERY

## 2021-01-01 PROCEDURE — 77030008684 HC TU ET CUF COVD -B: Performed by: ANESTHESIOLOGY

## 2021-01-01 PROCEDURE — 2709999900 HC NON-CHARGEABLE SUPPLY: Performed by: ORTHOPAEDIC SURGERY

## 2021-01-01 PROCEDURE — 65270000029 HC RM PRIVATE

## 2021-01-01 PROCEDURE — 86850 RBC ANTIBODY SCREEN: CPT

## 2021-01-01 PROCEDURE — HOSPICE MEDICATION HC HH HOSPICE MEDICATION

## 2021-01-01 PROCEDURE — 73502 X-RAY EXAM HIP UNI 2-3 VIEWS: CPT

## 2021-01-01 PROCEDURE — 77030040361 HC SLV COMPR DVT MDII -B: Performed by: ORTHOPAEDIC SURGERY

## 2021-01-01 PROCEDURE — 3331090004 HSPC SERVICE INTENSITY ADD-ON

## 2021-01-01 PROCEDURE — 70450 CT HEAD/BRAIN W/O DYE: CPT

## 2021-01-01 PROCEDURE — 73501 X-RAY EXAM HIP UNI 1 VIEW: CPT

## 2021-01-01 PROCEDURE — 77030013079 HC BLNKT BAIR HGGR 3M -A: Performed by: ANESTHESIOLOGY

## 2021-01-01 PROCEDURE — 96376 TX/PRO/DX INJ SAME DRUG ADON: CPT

## 2021-01-01 PROCEDURE — 87635 SARS-COV-2 COVID-19 AMP PRB: CPT

## 2021-01-01 PROCEDURE — 84145 PROCALCITONIN (PCT): CPT

## 2021-01-01 PROCEDURE — 99285 EMERGENCY DEPT VISIT HI MDM: CPT

## 2021-01-01 PROCEDURE — 74011000250 HC RX REV CODE- 250: Performed by: STUDENT IN AN ORGANIZED HEALTH CARE EDUCATION/TRAINING PROGRAM

## 2021-01-01 PROCEDURE — 92526 ORAL FUNCTION THERAPY: CPT | Performed by: SPEECH-LANGUAGE PATHOLOGIST

## 2021-01-01 PROCEDURE — 0QS704Z REPOSITION LEFT UPPER FEMUR WITH INTERNAL FIXATION DEVICE, OPEN APPROACH: ICD-10-PCS | Performed by: ORTHOPAEDIC SURGERY

## 2021-01-01 PROCEDURE — 51798 US URINE CAPACITY MEASURE: CPT

## 2021-01-01 PROCEDURE — 97166 OT EVAL MOD COMPLEX 45 MIN: CPT | Performed by: OCCUPATIONAL THERAPIST

## 2021-01-01 PROCEDURE — 76210000006 HC OR PH I REC 0.5 TO 1 HR: Performed by: ORTHOPAEDIC SURGERY

## 2021-01-01 PROCEDURE — 74011000250 HC RX REV CODE- 250: Performed by: NURSE ANESTHETIST, CERTIFIED REGISTERED

## 2021-01-01 PROCEDURE — 77030005513 HC CATH URETH FOL11 MDII -B

## 2021-01-01 PROCEDURE — 99221 1ST HOSP IP/OBS SF/LOW 40: CPT | Performed by: ORTHOPAEDIC SURGERY

## 2021-01-01 PROCEDURE — 93005 ELECTROCARDIOGRAM TRACING: CPT

## 2021-01-01 PROCEDURE — 77030026438 HC STYL ET INTUB CARD -A: Performed by: ANESTHESIOLOGY

## 2021-01-01 PROCEDURE — 71045 X-RAY EXAM CHEST 1 VIEW: CPT

## 2021-01-01 PROCEDURE — 92610 EVALUATE SWALLOWING FUNCTION: CPT | Performed by: SPEECH-LANGUAGE PATHOLOGIST

## 2021-01-01 PROCEDURE — G0155 HHCP-SVS OF CSW,EA 15 MIN: HCPCS

## 2021-01-01 PROCEDURE — 77030008462 HC STPLR SKN PROX J&J -A: Performed by: ORTHOPAEDIC SURGERY

## 2021-01-01 PROCEDURE — 96367 TX/PROPH/DG ADDL SEQ IV INF: CPT

## 2021-01-01 PROCEDURE — 74011000250 HC RX REV CODE- 250: Performed by: PHYSICIAN ASSISTANT

## 2021-01-01 PROCEDURE — C1769 GUIDE WIRE: HCPCS | Performed by: ORTHOPAEDIC SURGERY

## 2021-01-01 PROCEDURE — 84484 ASSAY OF TROPONIN QUANT: CPT

## 2021-01-01 PROCEDURE — 99223 1ST HOSP IP/OBS HIGH 75: CPT | Performed by: PSYCHIATRY & NEUROLOGY

## 2021-01-01 PROCEDURE — 93306 TTE W/DOPPLER COMPLETE: CPT

## 2021-01-01 PROCEDURE — 27245 TREAT THIGH FRACTURE: CPT | Performed by: ORTHOPAEDIC SURGERY

## 2021-01-01 PROCEDURE — 77030020061 HC IV BLD WRMR ADMIN SET 3M -B: Performed by: ANESTHESIOLOGY

## 2021-01-01 PROCEDURE — 97535 SELF CARE MNGMENT TRAINING: CPT | Performed by: OCCUPATIONAL THERAPIST

## 2021-01-01 PROCEDURE — 74011000258 HC RX REV CODE- 258: Performed by: STUDENT IN AN ORGANIZED HEALTH CARE EDUCATION/TRAINING PROGRAM

## 2021-01-01 PROCEDURE — 83735 ASSAY OF MAGNESIUM: CPT

## 2021-01-01 PROCEDURE — C1713 ANCHOR/SCREW BN/BN,TIS/BN: HCPCS | Performed by: ORTHOPAEDIC SURGERY

## 2021-01-01 PROCEDURE — 97535 SELF CARE MNGMENT TRAINING: CPT

## 2021-01-01 PROCEDURE — 85610 PROTHROMBIN TIME: CPT

## 2021-01-01 PROCEDURE — 96374 THER/PROPH/DIAG INJ IV PUSH: CPT

## 2021-01-01 PROCEDURE — 3336500001 HSPC ELECTION

## 2021-01-01 PROCEDURE — 87636 SARSCOV2 & INF A&B AMP PRB: CPT

## 2021-01-01 PROCEDURE — 82962 GLUCOSE BLOOD TEST: CPT

## 2021-01-01 PROCEDURE — 87040 BLOOD CULTURE FOR BACTERIA: CPT

## 2021-01-01 PROCEDURE — 97161 PT EVAL LOW COMPLEX 20 MIN: CPT

## 2021-01-01 PROCEDURE — 77030019908 HC STETH ESOPH SIMS -A: Performed by: ANESTHESIOLOGY

## 2021-01-01 PROCEDURE — U0005 INFEC AGEN DETEC AMPLI PROBE: HCPCS

## 2021-01-01 PROCEDURE — 97530 THERAPEUTIC ACTIVITIES: CPT | Performed by: OCCUPATIONAL THERAPIST

## 2021-01-01 PROCEDURE — 76060000033 HC ANESTHESIA 1 TO 1.5 HR: Performed by: ORTHOPAEDIC SURGERY

## 2021-01-01 PROCEDURE — 77030014405 HC GD ROD RMR SYNT -C: Performed by: ORTHOPAEDIC SURGERY

## 2021-01-01 PROCEDURE — 76000 FLUOROSCOPY <1 HR PHYS/QHP: CPT

## 2021-01-01 DEVICE — SCREW BNE L40MM DIA5MM CORT GRN TI ST LOK FULL THRD TRCR: Type: IMPLANTABLE DEVICE | Site: HIP | Status: FUNCTIONAL

## 2021-01-01 DEVICE — SCREW BNE L100MM DIA10.35MM G TI CANN PERF FOR PROX FEM: Type: IMPLANTABLE DEVICE | Site: HIP | Status: FUNCTIONAL

## 2021-01-01 DEVICE — NAIL IM L170MM DIA10MM NK 125DEG SHT PROX FEM GRN TI-15MO: Type: IMPLANTABLE DEVICE | Site: HIP | Status: FUNCTIONAL

## 2021-01-01 RX ORDER — GUAIFENESIN 100 MG/5ML
81 LIQUID (ML) ORAL DAILY
Status: DISCONTINUED | OUTPATIENT
Start: 2021-01-01 | End: 2021-01-01

## 2021-01-01 RX ORDER — CALCIUM CARBONATE 600 MG
600 TABLET ORAL 3 TIMES DAILY
COMMUNITY
End: 2021-01-01

## 2021-01-01 RX ORDER — SODIUM CHLORIDE 0.9 % (FLUSH) 0.9 %
5-10 SYRINGE (ML) INJECTION AS NEEDED
Status: DISCONTINUED | OUTPATIENT
Start: 2021-01-01 | End: 2021-01-01 | Stop reason: HOSPADM

## 2021-01-01 RX ORDER — MIDAZOLAM HYDROCHLORIDE 1 MG/ML
1 INJECTION, SOLUTION INTRAMUSCULAR; INTRAVENOUS AS NEEDED
Status: DISCONTINUED | OUTPATIENT
Start: 2021-01-01 | End: 2021-01-01 | Stop reason: HOSPADM

## 2021-01-01 RX ORDER — METOPROLOL TARTRATE 5 MG/5ML
2.5 INJECTION INTRAVENOUS
Status: DISCONTINUED | OUTPATIENT
Start: 2021-01-01 | End: 2021-01-01 | Stop reason: HOSPADM

## 2021-01-01 RX ORDER — METOCLOPRAMIDE HYDROCHLORIDE 5 MG/ML
5 INJECTION INTRAMUSCULAR; INTRAVENOUS
Status: DISCONTINUED | OUTPATIENT
Start: 2021-01-01 | End: 2021-01-01

## 2021-01-01 RX ORDER — PRAVASTATIN SODIUM 40 MG/1
80 TABLET ORAL DAILY
Status: DISCONTINUED | OUTPATIENT
Start: 2021-01-01 | End: 2021-01-01 | Stop reason: HOSPADM

## 2021-01-01 RX ORDER — LIDOCAINE HYDROCHLORIDE 5 MG/ML
50 INJECTION, SOLUTION INFILTRATION; PERINEURAL ONCE
Status: COMPLETED | OUTPATIENT
Start: 2021-01-01 | End: 2021-01-01

## 2021-01-01 RX ORDER — PHENYLEPHRINE HCL IN 0.9% NACL 0.4MG/10ML
SYRINGE (ML) INTRAVENOUS AS NEEDED
Status: DISCONTINUED | OUTPATIENT
Start: 2021-01-01 | End: 2021-01-01 | Stop reason: HOSPADM

## 2021-01-01 RX ORDER — DOXYCYCLINE HYCLATE 100 MG
100 TABLET ORAL EVERY 12 HOURS
Status: COMPLETED | OUTPATIENT
Start: 2021-01-01 | End: 2021-01-01

## 2021-01-01 RX ORDER — SODIUM CHLORIDE 9 MG/ML
125 INJECTION, SOLUTION INTRAVENOUS CONTINUOUS
Status: DISCONTINUED | OUTPATIENT
Start: 2021-01-01 | End: 2021-01-01 | Stop reason: HOSPADM

## 2021-01-01 RX ORDER — HYDROMORPHONE HYDROCHLORIDE 1 MG/ML
.5-1 INJECTION, SOLUTION INTRAMUSCULAR; INTRAVENOUS; SUBCUTANEOUS
Status: DISCONTINUED | OUTPATIENT
Start: 2021-01-01 | End: 2021-01-01

## 2021-01-01 RX ORDER — MAGNESIUM SULFATE HEPTAHYDRATE 40 MG/ML
2 INJECTION, SOLUTION INTRAVENOUS
Status: COMPLETED | OUTPATIENT
Start: 2021-01-01 | End: 2021-01-01

## 2021-01-01 RX ORDER — FAMOTIDINE 20 MG/1
20 TABLET, FILM COATED ORAL
COMMUNITY
End: 2021-01-01

## 2021-01-01 RX ORDER — GUAIFENESIN 100 MG/5ML
81 LIQUID (ML) ORAL DAILY
Status: DISCONTINUED | OUTPATIENT
Start: 2021-01-01 | End: 2021-01-01 | Stop reason: HOSPADM

## 2021-01-01 RX ORDER — ONDANSETRON 2 MG/ML
4 INJECTION INTRAMUSCULAR; INTRAVENOUS
Status: DISCONTINUED | OUTPATIENT
Start: 2021-01-01 | End: 2021-01-01 | Stop reason: HOSPADM

## 2021-01-01 RX ORDER — ACETAMINOPHEN 650 MG/1
650 SUPPOSITORY RECTAL
Status: DISCONTINUED | OUTPATIENT
Start: 2021-01-01 | End: 2021-01-01 | Stop reason: HOSPADM

## 2021-01-01 RX ORDER — HYDROMORPHONE HYDROCHLORIDE 1 MG/ML
0.5 INJECTION, SOLUTION INTRAMUSCULAR; INTRAVENOUS; SUBCUTANEOUS
Status: ACTIVE | OUTPATIENT
Start: 2021-01-01 | End: 2021-01-01

## 2021-01-01 RX ORDER — AMLODIPINE BESYLATE 5 MG/1
10 TABLET ORAL DAILY
Status: DISCONTINUED | OUTPATIENT
Start: 2021-01-01 | End: 2021-01-01 | Stop reason: HOSPADM

## 2021-01-01 RX ORDER — ENOXAPARIN SODIUM 100 MG/ML
40 INJECTION SUBCUTANEOUS EVERY 24 HOURS
Status: DISCONTINUED | OUTPATIENT
Start: 2021-01-01 | End: 2021-01-01 | Stop reason: HOSPADM

## 2021-01-01 RX ORDER — POLYETHYLENE GLYCOL 3350 17 G/17G
17 POWDER, FOR SOLUTION ORAL DAILY PRN
Status: DISCONTINUED | OUTPATIENT
Start: 2021-01-01 | End: 2021-01-01 | Stop reason: HOSPADM

## 2021-01-01 RX ORDER — MAGNESIUM SULFATE HEPTAHYDRATE 40 MG/ML
2 INJECTION, SOLUTION INTRAVENOUS ONCE
Status: COMPLETED | OUTPATIENT
Start: 2021-01-01 | End: 2021-01-01

## 2021-01-01 RX ORDER — ROPIVACAINE HYDROCHLORIDE 5 MG/ML
INJECTION, SOLUTION EPIDURAL; INFILTRATION; PERINEURAL AS NEEDED
Status: DISCONTINUED | OUTPATIENT
Start: 2021-01-01 | End: 2021-01-01 | Stop reason: HOSPADM

## 2021-01-01 RX ORDER — CITALOPRAM 20 MG/1
10 TABLET, FILM COATED ORAL EVERY EVENING
Status: DISCONTINUED | OUTPATIENT
Start: 2021-01-01 | End: 2021-01-01 | Stop reason: HOSPADM

## 2021-01-01 RX ORDER — FENTANYL CITRATE 50 UG/ML
50 INJECTION, SOLUTION INTRAMUSCULAR; INTRAVENOUS AS NEEDED
Status: DISCONTINUED | OUTPATIENT
Start: 2021-01-01 | End: 2021-01-01 | Stop reason: HOSPADM

## 2021-01-01 RX ORDER — ENOXAPARIN SODIUM 100 MG/ML
30 INJECTION SUBCUTANEOUS EVERY 12 HOURS
Qty: 12.6 ML | Refills: 0 | Status: SHIPPED | OUTPATIENT
Start: 2021-01-01 | End: 2021-01-01

## 2021-01-01 RX ORDER — HYDROMORPHONE HYDROCHLORIDE 1 MG/ML
.2-.5 INJECTION, SOLUTION INTRAMUSCULAR; INTRAVENOUS; SUBCUTANEOUS
Status: DISCONTINUED | OUTPATIENT
Start: 2021-01-01 | End: 2021-01-01 | Stop reason: HOSPADM

## 2021-01-01 RX ORDER — FAMOTIDINE 20 MG/1
20 TABLET, FILM COATED ORAL DAILY
Status: DISCONTINUED | OUTPATIENT
Start: 2021-01-01 | End: 2021-01-01 | Stop reason: HOSPADM

## 2021-01-01 RX ORDER — AMOXICILLIN 250 MG
2 CAPSULE ORAL 2 TIMES DAILY
Status: DISCONTINUED | OUTPATIENT
Start: 2021-01-01 | End: 2021-01-01 | Stop reason: HOSPADM

## 2021-01-01 RX ORDER — TRAMADOL HYDROCHLORIDE 50 MG/1
50 TABLET ORAL
Status: DISCONTINUED | OUTPATIENT
Start: 2021-01-01 | End: 2021-01-01 | Stop reason: HOSPADM

## 2021-01-01 RX ORDER — LIDOCAINE HYDROCHLORIDE 10 MG/ML
0.1 INJECTION, SOLUTION EPIDURAL; INFILTRATION; INTRACAUDAL; PERINEURAL AS NEEDED
Status: DISCONTINUED | OUTPATIENT
Start: 2021-01-01 | End: 2021-01-01 | Stop reason: HOSPADM

## 2021-01-01 RX ORDER — CITALOPRAM 10 MG/1
10 TABLET ORAL EVERY EVENING
Qty: 10 TAB | Refills: 0 | Status: SHIPPED | OUTPATIENT
Start: 2021-01-01 | End: 2021-01-01 | Stop reason: SINTOL

## 2021-01-01 RX ORDER — NALOXONE HYDROCHLORIDE 0.4 MG/ML
0.4 INJECTION, SOLUTION INTRAMUSCULAR; INTRAVENOUS; SUBCUTANEOUS AS NEEDED
Status: DISCONTINUED | OUTPATIENT
Start: 2021-01-01 | End: 2021-01-01 | Stop reason: HOSPADM

## 2021-01-01 RX ORDER — ACETAMINOPHEN 325 MG/1
650 TABLET ORAL EVERY 6 HOURS
Status: DISCONTINUED | OUTPATIENT
Start: 2021-01-01 | End: 2021-01-01 | Stop reason: HOSPADM

## 2021-01-01 RX ORDER — PRAVASTATIN SODIUM 40 MG/1
40 TABLET ORAL
COMMUNITY
End: 2021-01-01

## 2021-01-01 RX ORDER — OXYCODONE HYDROCHLORIDE 5 MG/1
2.5 TABLET ORAL
Status: DISCONTINUED | OUTPATIENT
Start: 2021-01-01 | End: 2021-01-01 | Stop reason: HOSPADM

## 2021-01-01 RX ORDER — ONDANSETRON 2 MG/ML
INJECTION INTRAMUSCULAR; INTRAVENOUS AS NEEDED
Status: DISCONTINUED | OUTPATIENT
Start: 2021-01-01 | End: 2021-01-01 | Stop reason: HOSPADM

## 2021-01-01 RX ORDER — FACIAL-BODY WIPES
10 EACH TOPICAL DAILY PRN
Status: DISCONTINUED | OUTPATIENT
Start: 2021-01-01 | End: 2021-01-01 | Stop reason: HOSPADM

## 2021-01-01 RX ORDER — SODIUM CHLORIDE 9 MG/ML
150 INJECTION, SOLUTION INTRAVENOUS ONCE
Status: COMPLETED | OUTPATIENT
Start: 2021-01-01 | End: 2021-01-01

## 2021-01-01 RX ORDER — EPHEDRINE SULFATE/0.9% NACL/PF 50 MG/5 ML
SYRINGE (ML) INTRAVENOUS AS NEEDED
Status: DISCONTINUED | OUTPATIENT
Start: 2021-01-01 | End: 2021-01-01 | Stop reason: HOSPADM

## 2021-01-01 RX ORDER — PROMETHAZINE HYDROCHLORIDE 25 MG/1
12.5 TABLET ORAL
Status: DISCONTINUED | OUTPATIENT
Start: 2021-01-01 | End: 2021-01-01 | Stop reason: HOSPADM

## 2021-01-01 RX ORDER — ACETAMINOPHEN 325 MG/1
650 TABLET ORAL
Status: DISCONTINUED | OUTPATIENT
Start: 2021-01-01 | End: 2021-01-01 | Stop reason: HOSPADM

## 2021-01-01 RX ORDER — OXYCODONE HYDROCHLORIDE 5 MG/1
5 TABLET ORAL
Status: DISCONTINUED | OUTPATIENT
Start: 2021-01-01 | End: 2021-01-01

## 2021-01-01 RX ORDER — SODIUM CHLORIDE 0.9 % (FLUSH) 0.9 %
5-40 SYRINGE (ML) INJECTION AS NEEDED
Status: DISCONTINUED | OUTPATIENT
Start: 2021-01-01 | End: 2021-01-01 | Stop reason: HOSPADM

## 2021-01-01 RX ORDER — AMIODARONE HYDROCHLORIDE 400 MG/1
400 TABLET ORAL 3 TIMES DAILY
Qty: 90 TABLET | Refills: 0 | Status: SHIPPED | OUTPATIENT
Start: 2021-01-01 | End: 2021-07-02

## 2021-01-01 RX ORDER — ENOXAPARIN SODIUM 100 MG/ML
40 INJECTION SUBCUTANEOUS DAILY
Status: DISCONTINUED | OUTPATIENT
Start: 2021-01-01 | End: 2021-01-01

## 2021-01-01 RX ORDER — SODIUM CHLORIDE 0.9 % (FLUSH) 0.9 %
5-40 SYRINGE (ML) INJECTION EVERY 8 HOURS
Status: DISCONTINUED | OUTPATIENT
Start: 2021-01-01 | End: 2021-01-01 | Stop reason: HOSPADM

## 2021-01-01 RX ORDER — SODIUM CHLORIDE, SODIUM LACTATE, POTASSIUM CHLORIDE, CALCIUM CHLORIDE 600; 310; 30; 20 MG/100ML; MG/100ML; MG/100ML; MG/100ML
25 INJECTION, SOLUTION INTRAVENOUS CONTINUOUS
Status: DISCONTINUED | OUTPATIENT
Start: 2021-01-01 | End: 2021-01-01 | Stop reason: HOSPADM

## 2021-01-01 RX ORDER — AMIODARONE HYDROCHLORIDE 200 MG/1
400 TABLET ORAL 3 TIMES DAILY
Status: DISCONTINUED | OUTPATIENT
Start: 2021-01-01 | End: 2021-01-01 | Stop reason: HOSPADM

## 2021-01-01 RX ORDER — FERROUS SULFATE, DRIED 160(50) MG
1 TABLET, EXTENDED RELEASE ORAL
Status: DISCONTINUED | OUTPATIENT
Start: 2021-01-01 | End: 2021-01-01 | Stop reason: HOSPADM

## 2021-01-01 RX ORDER — SUCCINYLCHOLINE CHLORIDE 20 MG/ML
INJECTION INTRAMUSCULAR; INTRAVENOUS AS NEEDED
Status: DISCONTINUED | OUTPATIENT
Start: 2021-01-01 | End: 2021-01-01 | Stop reason: HOSPADM

## 2021-01-01 RX ORDER — LIDOCAINE HYDROCHLORIDE 20 MG/ML
INJECTION, SOLUTION EPIDURAL; INFILTRATION; INTRACAUDAL; PERINEURAL AS NEEDED
Status: DISCONTINUED | OUTPATIENT
Start: 2021-01-01 | End: 2021-01-01 | Stop reason: HOSPADM

## 2021-01-01 RX ORDER — OXYCODONE HYDROCHLORIDE 5 MG/1
2.5 TABLET ORAL
Qty: 10 TAB | Refills: 0 | Status: SHIPPED | OUTPATIENT
Start: 2021-01-01 | End: 2021-01-01

## 2021-01-01 RX ORDER — POLYETHYLENE GLYCOL 3350 17 G/17G
17 POWDER, FOR SOLUTION ORAL DAILY
Status: DISCONTINUED | OUTPATIENT
Start: 2021-01-01 | End: 2021-01-01 | Stop reason: HOSPADM

## 2021-01-01 RX ORDER — FENTANYL CITRATE 50 UG/ML
INJECTION, SOLUTION INTRAMUSCULAR; INTRAVENOUS AS NEEDED
Status: DISCONTINUED | OUTPATIENT
Start: 2021-01-01 | End: 2021-01-01 | Stop reason: HOSPADM

## 2021-01-01 RX ORDER — SODIUM CHLORIDE 9 MG/ML
75 INJECTION, SOLUTION INTRAVENOUS CONTINUOUS
Status: DISCONTINUED | OUTPATIENT
Start: 2021-01-01 | End: 2021-01-01 | Stop reason: HOSPADM

## 2021-01-01 RX ORDER — FUROSEMIDE 10 MG/ML
20 INJECTION INTRAMUSCULAR; INTRAVENOUS EVERY 8 HOURS
Status: DISCONTINUED | OUTPATIENT
Start: 2021-01-01 | End: 2021-01-01

## 2021-01-01 RX ORDER — GLYCOPYRROLATE 0.2 MG/ML
INJECTION INTRAMUSCULAR; INTRAVENOUS AS NEEDED
Status: DISCONTINUED | OUTPATIENT
Start: 2021-01-01 | End: 2021-01-01 | Stop reason: HOSPADM

## 2021-01-01 RX ORDER — DEXAMETHASONE SODIUM PHOSPHATE 4 MG/ML
INJECTION, SOLUTION INTRA-ARTICULAR; INTRALESIONAL; INTRAMUSCULAR; INTRAVENOUS; SOFT TISSUE AS NEEDED
Status: DISCONTINUED | OUTPATIENT
Start: 2021-01-01 | End: 2021-01-01 | Stop reason: HOSPADM

## 2021-01-01 RX ORDER — ENOXAPARIN SODIUM 100 MG/ML
30 INJECTION SUBCUTANEOUS EVERY 12 HOURS
Status: DISCONTINUED | OUTPATIENT
Start: 2021-01-01 | End: 2021-01-01 | Stop reason: HOSPADM

## 2021-01-01 RX ORDER — AMOXICILLIN 250 MG
1 CAPSULE ORAL 2 TIMES DAILY
Status: DISCONTINUED | OUTPATIENT
Start: 2021-01-01 | End: 2021-01-01

## 2021-01-01 RX ORDER — MELATONIN
1000 DAILY
COMMUNITY
End: 2021-01-01

## 2021-01-01 RX ORDER — ACETAMINOPHEN 325 MG/1
650 TABLET ORAL EVERY 6 HOURS
Status: DISCONTINUED | OUTPATIENT
Start: 2021-01-01 | End: 2021-01-01

## 2021-01-01 RX ORDER — SODIUM CHLORIDE 9 MG/ML
125 INJECTION, SOLUTION INTRAVENOUS CONTINUOUS
Status: DISPENSED | OUTPATIENT
Start: 2021-01-01 | End: 2021-01-01

## 2021-01-01 RX ORDER — BACLOFEN 5 MG/1
5 TABLET ORAL 2 TIMES DAILY
COMMUNITY
End: 2021-01-01

## 2021-01-01 RX ORDER — METOPROLOL TARTRATE 25 MG/1
12.5 TABLET, FILM COATED ORAL 2 TIMES DAILY
Qty: 30 TABLET | Refills: 0 | Status: SHIPPED | OUTPATIENT
Start: 2021-01-01 | End: 2021-07-02

## 2021-01-01 RX ORDER — FENTANYL CITRATE 50 UG/ML
25 INJECTION, SOLUTION INTRAMUSCULAR; INTRAVENOUS
Status: DISCONTINUED | OUTPATIENT
Start: 2021-01-01 | End: 2021-01-01 | Stop reason: HOSPADM

## 2021-01-01 RX ORDER — FERROUS SULFATE, DRIED 160(50) MG
1 TABLET, EXTENDED RELEASE ORAL
Status: DISCONTINUED | OUTPATIENT
Start: 2021-01-01 | End: 2021-01-01

## 2021-01-01 RX ORDER — METOPROLOL SUCCINATE 50 MG/1
25 TABLET, EXTENDED RELEASE ORAL DAILY
Status: DISCONTINUED | OUTPATIENT
Start: 2021-01-01 | End: 2021-01-01

## 2021-01-01 RX ORDER — PROPOFOL 10 MG/ML
INJECTION, EMULSION INTRAVENOUS AS NEEDED
Status: DISCONTINUED | OUTPATIENT
Start: 2021-01-01 | End: 2021-01-01 | Stop reason: HOSPADM

## 2021-01-01 RX ORDER — METOPROLOL SUCCINATE 25 MG/1
12.5 TABLET, EXTENDED RELEASE ORAL DAILY
COMMUNITY
End: 2021-01-01

## 2021-01-01 RX ORDER — NEOSTIGMINE METHYLSULFATE 1 MG/ML
INJECTION, SOLUTION INTRAVENOUS AS NEEDED
Status: DISCONTINUED | OUTPATIENT
Start: 2021-01-01 | End: 2021-01-01 | Stop reason: HOSPADM

## 2021-01-01 RX ORDER — ROCURONIUM BROMIDE 10 MG/ML
INJECTION, SOLUTION INTRAVENOUS AS NEEDED
Status: DISCONTINUED | OUTPATIENT
Start: 2021-01-01 | End: 2021-01-01 | Stop reason: HOSPADM

## 2021-01-01 RX ORDER — ESMOLOL HYDROCHLORIDE 10 MG/ML
INJECTION INTRAVENOUS AS NEEDED
Status: DISCONTINUED | OUTPATIENT
Start: 2021-01-01 | End: 2021-01-01 | Stop reason: HOSPADM

## 2021-01-01 RX ORDER — METOPROLOL SUCCINATE 25 MG/1
25 TABLET, EXTENDED RELEASE ORAL DAILY
Status: DISCONTINUED | OUTPATIENT
Start: 2021-01-01 | End: 2021-01-01 | Stop reason: HOSPADM

## 2021-01-01 RX ORDER — CARBIDOPA AND LEVODOPA 25; 100 MG/1; MG/1
1 TABLET ORAL 3 TIMES DAILY
Status: DISCONTINUED | OUTPATIENT
Start: 2021-01-01 | End: 2021-01-01 | Stop reason: HOSPADM

## 2021-01-01 RX ORDER — LANOLIN ALCOHOL/MO/W.PET/CERES
3 CREAM (GRAM) TOPICAL
COMMUNITY
End: 2021-01-01 | Stop reason: CLARIF

## 2021-01-01 RX ORDER — ONDANSETRON 2 MG/ML
4 INJECTION INTRAMUSCULAR; INTRAVENOUS AS NEEDED
Status: DISCONTINUED | OUTPATIENT
Start: 2021-01-01 | End: 2021-01-01 | Stop reason: HOSPADM

## 2021-01-01 RX ORDER — HYDROCODONE BITARTRATE AND ACETAMINOPHEN 5; 325 MG/1; MG/1
1 TABLET ORAL
Status: DISCONTINUED | OUTPATIENT
Start: 2021-01-01 | End: 2021-01-01 | Stop reason: HOSPADM

## 2021-01-01 RX ORDER — METOPROLOL TARTRATE 5 MG/5ML
2.5 INJECTION INTRAVENOUS EVERY 4 HOURS
Status: DISCONTINUED | OUTPATIENT
Start: 2021-01-01 | End: 2021-01-01

## 2021-01-01 RX ORDER — FENTANYL CITRATE 50 UG/ML
50 INJECTION, SOLUTION INTRAMUSCULAR; INTRAVENOUS
Status: COMPLETED | OUTPATIENT
Start: 2021-01-01 | End: 2021-01-01

## 2021-01-01 RX ORDER — CITALOPRAM 10 MG/1
10 TABLET ORAL 2 TIMES DAILY
COMMUNITY
End: 2021-01-01

## 2021-01-01 RX ORDER — METOPROLOL TARTRATE 25 MG/1
12.5 TABLET, FILM COATED ORAL 2 TIMES DAILY
Status: DISCONTINUED | OUTPATIENT
Start: 2021-01-01 | End: 2021-01-01 | Stop reason: HOSPADM

## 2021-01-01 RX ORDER — POTASSIUM CHLORIDE 7.45 MG/ML
10 INJECTION INTRAVENOUS
Status: COMPLETED | OUTPATIENT
Start: 2021-01-01 | End: 2021-01-01

## 2021-01-01 RX ORDER — SODIUM CHLORIDE 9 MG/ML
125 INJECTION, SOLUTION INTRAVENOUS CONTINUOUS
Status: DISCONTINUED | OUTPATIENT
Start: 2021-01-01 | End: 2021-01-01

## 2021-01-01 RX ADMIN — ACETAMINOPHEN 650 MG: 325 TABLET ORAL at 18:01

## 2021-01-01 RX ADMIN — Medication 3 MG: at 14:51

## 2021-01-01 RX ADMIN — ACETAMINOPHEN 650 MG: 325 TABLET ORAL at 15:16

## 2021-01-01 RX ADMIN — DOXYCYCLINE HYCLATE 100 MG: 100 TABLET, COATED ORAL at 21:47

## 2021-01-01 RX ADMIN — PRAVASTATIN SODIUM 80 MG: 40 TABLET ORAL at 08:36

## 2021-01-01 RX ADMIN — Medication 10 ML: at 06:00

## 2021-01-01 RX ADMIN — AMIODARONE HYDROCHLORIDE 400 MG: 200 TABLET ORAL at 09:09

## 2021-01-01 RX ADMIN — DOCUSATE SODIUM AND SENNOSIDES 2 TABLET: 8.6; 5 TABLET, FILM COATED ORAL at 18:00

## 2021-01-01 RX ADMIN — CARBIDOPA AND LEVODOPA 1 TABLET: 25; 100 TABLET ORAL at 22:27

## 2021-01-01 RX ADMIN — SODIUM CHLORIDE 125 ML/HR: 9 INJECTION, SOLUTION INTRAVENOUS at 15:30

## 2021-01-01 RX ADMIN — AMIODARONE HYDROCHLORIDE 400 MG: 200 TABLET ORAL at 16:38

## 2021-01-01 RX ADMIN — DOXYCYCLINE HYCLATE 100 MG: 100 TABLET, COATED ORAL at 08:39

## 2021-01-01 RX ADMIN — METOPROLOL TARTRATE 2.5 MG: 5 INJECTION INTRAVENOUS at 13:34

## 2021-01-01 RX ADMIN — METOPROLOL TARTRATE 12.5 MG: 25 TABLET, FILM COATED ORAL at 17:29

## 2021-01-01 RX ADMIN — PRAVASTATIN SODIUM 80 MG: 40 TABLET ORAL at 10:09

## 2021-01-01 RX ADMIN — METOPROLOL TARTRATE 12.5 MG: 25 TABLET, FILM COATED ORAL at 08:51

## 2021-01-01 RX ADMIN — Medication 10 ML: at 22:25

## 2021-01-01 RX ADMIN — ACETAMINOPHEN 650 MG: 325 TABLET ORAL at 13:15

## 2021-01-01 RX ADMIN — CARBIDOPA AND LEVODOPA 1 TABLET: 25; 100 TABLET ORAL at 22:00

## 2021-01-01 RX ADMIN — LIDOCAINE HYDROCHLORIDE 50 MG: 5 INJECTION, SOLUTION INFILTRATION; PERINEURAL at 22:07

## 2021-01-01 RX ADMIN — METOPROLOL TARTRATE 2.5 MG: 5 INJECTION INTRAVENOUS at 22:35

## 2021-01-01 RX ADMIN — SODIUM CHLORIDE 125 ML/HR: 9 INJECTION, SOLUTION INTRAVENOUS at 17:59

## 2021-01-01 RX ADMIN — MAGNESIUM SULFATE HEPTAHYDRATE 2 G: 40 INJECTION, SOLUTION INTRAVENOUS at 20:41

## 2021-01-01 RX ADMIN — ASPIRIN 81 MG CHEWABLE TABLET 81 MG: 81 TABLET CHEWABLE at 08:45

## 2021-01-01 RX ADMIN — CARBIDOPA AND LEVODOPA 1 TABLET: 25; 100 TABLET ORAL at 15:25

## 2021-01-01 RX ADMIN — CEFAZOLIN SODIUM 2 G: 1 INJECTION, POWDER, FOR SOLUTION INTRAMUSCULAR; INTRAVENOUS at 05:37

## 2021-01-01 RX ADMIN — Medication 1 TABLET: at 08:41

## 2021-01-01 RX ADMIN — DOXYCYCLINE HYCLATE 100 MG: 100 TABLET, COATED ORAL at 09:20

## 2021-01-01 RX ADMIN — Medication 10 ML: at 13:34

## 2021-01-01 RX ADMIN — DOCUSATE SODIUM AND SENNOSIDES 2 TABLET: 8.6; 5 TABLET, FILM COATED ORAL at 08:41

## 2021-01-01 RX ADMIN — DOCUSATE SODIUM AND SENNOSIDES 2 TABLET: 8.6; 5 TABLET, FILM COATED ORAL at 17:01

## 2021-01-01 RX ADMIN — AMIODARONE HYDROCHLORIDE 400 MG: 200 TABLET ORAL at 21:08

## 2021-01-01 RX ADMIN — CARBIDOPA AND LEVODOPA 1 TABLET: 25; 100 TABLET ORAL at 09:15

## 2021-01-01 RX ADMIN — POLYETHYLENE GLYCOL 3350 17 G: 17 POWDER, FOR SOLUTION ORAL at 10:08

## 2021-01-01 RX ADMIN — DOCUSATE SODIUM AND SENNOSIDES 2 TABLET: 8.6; 5 TABLET, FILM COATED ORAL at 09:15

## 2021-01-01 RX ADMIN — Medication 10 ML: at 06:06

## 2021-01-01 RX ADMIN — ACETAMINOPHEN 650 MG: 325 TABLET ORAL at 17:22

## 2021-01-01 RX ADMIN — METOPROLOL TARTRATE 12.5 MG: 25 TABLET, FILM COATED ORAL at 09:19

## 2021-01-01 RX ADMIN — Medication 10 ML: at 15:17

## 2021-01-01 RX ADMIN — ENOXAPARIN SODIUM 30 MG: 100 INJECTION SUBCUTANEOUS at 21:43

## 2021-01-01 RX ADMIN — ENOXAPARIN SODIUM 30 MG: 100 INJECTION SUBCUTANEOUS at 10:52

## 2021-01-01 RX ADMIN — CARBIDOPA AND LEVODOPA 1 TABLET: 25; 100 TABLET ORAL at 10:09

## 2021-01-01 RX ADMIN — Medication 10 ML: at 13:21

## 2021-01-01 RX ADMIN — METOPROLOL TARTRATE 2.5 MG: 5 INJECTION INTRAVENOUS at 04:24

## 2021-01-01 RX ADMIN — ROCURONIUM BROMIDE 5 MG: 10 INJECTION INTRAVENOUS at 13:59

## 2021-01-01 RX ADMIN — ASPIRIN 81 MG CHEWABLE TABLET 81 MG: 81 TABLET CHEWABLE at 08:51

## 2021-01-01 RX ADMIN — ASPIRIN 81 MG: 81 TABLET, CHEWABLE ORAL at 10:09

## 2021-01-01 RX ADMIN — METOPROLOL TARTRATE 12.5 MG: 25 TABLET, FILM COATED ORAL at 08:39

## 2021-01-01 RX ADMIN — METOPROLOL TARTRATE 12.5 MG: 25 TABLET, FILM COATED ORAL at 16:38

## 2021-01-01 RX ADMIN — CARBIDOPA AND LEVODOPA 1 TABLET: 25; 100 TABLET ORAL at 11:47

## 2021-01-01 RX ADMIN — ACETAMINOPHEN 650 MG: 325 TABLET ORAL at 05:26

## 2021-01-01 RX ADMIN — SODIUM CHLORIDE 75 ML/HR: 9 INJECTION, SOLUTION INTRAVENOUS at 04:55

## 2021-01-01 RX ADMIN — Medication 10 ML: at 22:07

## 2021-01-01 RX ADMIN — AMLODIPINE BESYLATE 10 MG: 5 TABLET ORAL at 08:42

## 2021-01-01 RX ADMIN — ACETAMINOPHEN 650 MG: 325 TABLET ORAL at 11:15

## 2021-01-01 RX ADMIN — ENOXAPARIN SODIUM 30 MG: 100 INJECTION SUBCUTANEOUS at 09:26

## 2021-01-01 RX ADMIN — Medication 10 ML: at 22:26

## 2021-01-01 RX ADMIN — AMIODARONE HYDROCHLORIDE 0.5 MG/MIN: 50 INJECTION, SOLUTION INTRAVENOUS at 10:42

## 2021-01-01 RX ADMIN — DOXYCYCLINE 100 MG: 100 INJECTION, POWDER, LYOPHILIZED, FOR SOLUTION INTRAVENOUS at 21:00

## 2021-01-01 RX ADMIN — AMIODARONE HYDROCHLORIDE 400 MG: 200 TABLET ORAL at 21:32

## 2021-01-01 RX ADMIN — METOPROLOL TARTRATE 2.5 MG: 5 INJECTION INTRAVENOUS at 09:01

## 2021-01-01 RX ADMIN — CARBIDOPA AND LEVODOPA 1 TABLET: 25; 100 TABLET ORAL at 21:32

## 2021-01-01 RX ADMIN — METOPROLOL TARTRATE 12.5 MG: 25 TABLET, FILM COATED ORAL at 18:06

## 2021-01-01 RX ADMIN — CITALOPRAM HYDROBROMIDE 10 MG: 20 TABLET ORAL at 18:00

## 2021-01-01 RX ADMIN — METOPROLOL SUCCINATE 25 MG: 25 TABLET, EXTENDED RELEASE ORAL at 09:16

## 2021-01-01 RX ADMIN — Medication 10 ML: at 05:28

## 2021-01-01 RX ADMIN — PRAVASTATIN SODIUM 80 MG: 40 TABLET ORAL at 08:39

## 2021-01-01 RX ADMIN — AMIODARONE HYDROCHLORIDE 400 MG: 200 TABLET ORAL at 16:00

## 2021-01-01 RX ADMIN — PROPOFOL 50 MG: 10 INJECTION, EMULSION INTRAVENOUS at 14:21

## 2021-01-01 RX ADMIN — CARBIDOPA AND LEVODOPA 1 TABLET: 25; 100 TABLET ORAL at 22:25

## 2021-01-01 RX ADMIN — ENOXAPARIN SODIUM 40 MG: 40 INJECTION SUBCUTANEOUS at 20:07

## 2021-01-01 RX ADMIN — HYDROCODONE BITARTRATE AND ACETAMINOPHEN 1 TABLET: 5; 325 TABLET ORAL at 09:59

## 2021-01-01 RX ADMIN — MAGNESIUM SULFATE HEPTAHYDRATE 2 G: 40 INJECTION, SOLUTION INTRAVENOUS at 18:44

## 2021-01-01 RX ADMIN — Medication 10 ML: at 22:21

## 2021-01-01 RX ADMIN — AMIODARONE HYDROCHLORIDE 400 MG: 200 TABLET ORAL at 21:34

## 2021-01-01 RX ADMIN — METOPROLOL TARTRATE 12.5 MG: 25 TABLET, FILM COATED ORAL at 18:16

## 2021-01-01 RX ADMIN — DEXAMETHASONE SODIUM PHOSPHATE 8 MG: 4 INJECTION, SOLUTION INTRAMUSCULAR; INTRAVENOUS at 14:14

## 2021-01-01 RX ADMIN — CARBIDOPA AND LEVODOPA 1 TABLET: 25; 100 TABLET ORAL at 21:28

## 2021-01-01 RX ADMIN — CARBIDOPA AND LEVODOPA 1 TABLET: 25; 100 TABLET ORAL at 16:00

## 2021-01-01 RX ADMIN — CEFAZOLIN SODIUM 2 G: 1 INJECTION, POWDER, FOR SOLUTION INTRAMUSCULAR; INTRAVENOUS at 22:27

## 2021-01-01 RX ADMIN — CARBIDOPA AND LEVODOPA 1 TABLET: 25; 100 TABLET ORAL at 21:08

## 2021-01-01 RX ADMIN — ACETAMINOPHEN 650 MG: 325 TABLET ORAL at 00:29

## 2021-01-01 RX ADMIN — Medication 1 TABLET: at 09:16

## 2021-01-01 RX ADMIN — CARBIDOPA AND LEVODOPA 1 TABLET: 25; 100 TABLET ORAL at 08:51

## 2021-01-01 RX ADMIN — ESMOLOL HYDROCHLORIDE 5 MG: 10 INJECTION, SOLUTION INTRAVENOUS at 15:00

## 2021-01-01 RX ADMIN — ACETAMINOPHEN 650 MG: 325 TABLET ORAL at 17:01

## 2021-01-01 RX ADMIN — AMIODARONE HYDROCHLORIDE 0.5 MG/MIN: 50 INJECTION, SOLUTION INTRAVENOUS at 22:34

## 2021-01-01 RX ADMIN — ENOXAPARIN SODIUM 40 MG: 40 INJECTION SUBCUTANEOUS at 22:35

## 2021-01-01 RX ADMIN — WATER 2 G: 1 INJECTION INTRAMUSCULAR; INTRAVENOUS; SUBCUTANEOUS at 14:06

## 2021-01-01 RX ADMIN — METOPROLOL TARTRATE 2.5 MG: 5 INJECTION INTRAVENOUS at 02:48

## 2021-01-01 RX ADMIN — POLYETHYLENE GLYCOL 3350 17 G: 17 POWDER, FOR SOLUTION ORAL at 08:42

## 2021-01-01 RX ADMIN — ENOXAPARIN SODIUM 40 MG: 40 INJECTION SUBCUTANEOUS at 21:46

## 2021-01-01 RX ADMIN — AMIODARONE HYDROCHLORIDE 400 MG: 200 TABLET ORAL at 09:19

## 2021-01-01 RX ADMIN — AMIODARONE HYDROCHLORIDE 400 MG: 200 TABLET ORAL at 16:13

## 2021-01-01 RX ADMIN — AMIODARONE HYDROCHLORIDE 400 MG: 200 TABLET ORAL at 22:06

## 2021-01-01 RX ADMIN — Medication 1 TABLET: at 18:01

## 2021-01-01 RX ADMIN — Medication 10 ML: at 14:00

## 2021-01-01 RX ADMIN — METOPROLOL TARTRATE 2.5 MG: 5 INJECTION INTRAVENOUS at 13:15

## 2021-01-01 RX ADMIN — CEFTRIAXONE SODIUM 2 G: 2 INJECTION, POWDER, FOR SOLUTION INTRAMUSCULAR; INTRAVENOUS at 21:51

## 2021-01-01 RX ADMIN — PRAVASTATIN SODIUM 80 MG: 40 TABLET ORAL at 08:45

## 2021-01-01 RX ADMIN — ESMOLOL HYDROCHLORIDE 5 MG: 10 INJECTION, SOLUTION INTRAVENOUS at 14:57

## 2021-01-01 RX ADMIN — METOPROLOL SUCCINATE 25 MG: 25 TABLET, EXTENDED RELEASE ORAL at 08:41

## 2021-01-01 RX ADMIN — AMIODARONE HYDROCHLORIDE 400 MG: 200 TABLET ORAL at 11:47

## 2021-01-01 RX ADMIN — CARBIDOPA AND LEVODOPA 1 TABLET: 25; 100 TABLET ORAL at 16:25

## 2021-01-01 RX ADMIN — TRAMADOL HYDROCHLORIDE 50 MG: 50 TABLET, FILM COATED ORAL at 09:16

## 2021-01-01 RX ADMIN — Medication 10 ML: at 14:55

## 2021-01-01 RX ADMIN — AMIODARONE HYDROCHLORIDE 0.5 MG/MIN: 50 INJECTION, SOLUTION INTRAVENOUS at 11:38

## 2021-01-01 RX ADMIN — AMLODIPINE BESYLATE 10 MG: 5 TABLET ORAL at 08:35

## 2021-01-01 RX ADMIN — HYDROCODONE BITARTRATE AND ACETAMINOPHEN 1 TABLET: 5; 325 TABLET ORAL at 14:54

## 2021-01-01 RX ADMIN — ASPIRIN 81 MG CHEWABLE TABLET 81 MG: 81 TABLET CHEWABLE at 08:39

## 2021-01-01 RX ADMIN — PRAVASTATIN SODIUM 80 MG: 40 TABLET ORAL at 09:19

## 2021-01-01 RX ADMIN — Medication 1 TABLET: at 13:15

## 2021-01-01 RX ADMIN — METOPROLOL TARTRATE 12.5 MG: 25 TABLET, FILM COATED ORAL at 11:47

## 2021-01-01 RX ADMIN — DOCUSATE SODIUM AND SENNOSIDES 2 TABLET: 8.6; 5 TABLET, FILM COATED ORAL at 10:08

## 2021-01-01 RX ADMIN — ENOXAPARIN SODIUM 30 MG: 100 INJECTION SUBCUTANEOUS at 22:27

## 2021-01-01 RX ADMIN — DOXYCYCLINE HYCLATE 100 MG: 100 TABLET, COATED ORAL at 21:34

## 2021-01-01 RX ADMIN — ACETAMINOPHEN 650 MG: 325 TABLET ORAL at 06:09

## 2021-01-01 RX ADMIN — AMIODARONE HYDROCHLORIDE 400 MG: 200 TABLET ORAL at 17:25

## 2021-01-01 RX ADMIN — AMIODARONE HYDROCHLORIDE 400 MG: 200 TABLET ORAL at 08:45

## 2021-01-01 RX ADMIN — Medication 10 ML: at 12:18

## 2021-01-01 RX ADMIN — AMIODARONE HYDROCHLORIDE 1 MG/MIN: 50 INJECTION, SOLUTION INTRAVENOUS at 16:21

## 2021-01-01 RX ADMIN — CARBIDOPA AND LEVODOPA 1 TABLET: 25; 100 TABLET ORAL at 16:22

## 2021-01-01 RX ADMIN — Medication 10 ML: at 21:52

## 2021-01-01 RX ADMIN — ENOXAPARIN SODIUM 30 MG: 100 INJECTION SUBCUTANEOUS at 21:26

## 2021-01-01 RX ADMIN — Medication 10 ML: at 06:41

## 2021-01-01 RX ADMIN — ASPIRIN 81 MG CHEWABLE TABLET 81 MG: 81 TABLET CHEWABLE at 09:08

## 2021-01-01 RX ADMIN — ACETAMINOPHEN 650 MG: 325 TABLET ORAL at 18:00

## 2021-01-01 RX ADMIN — PRAVASTATIN SODIUM 80 MG: 40 TABLET ORAL at 08:50

## 2021-01-01 RX ADMIN — SODIUM CHLORIDE 75 ML/HR: 9 INJECTION, SOLUTION INTRAVENOUS at 14:48

## 2021-01-01 RX ADMIN — CARBIDOPA AND LEVODOPA 1 TABLET: 25; 100 TABLET ORAL at 16:38

## 2021-01-01 RX ADMIN — Medication 10 ML: at 13:18

## 2021-01-01 RX ADMIN — POTASSIUM CHLORIDE 10 MEQ: 10 INJECTION, SOLUTION INTRAVENOUS at 20:37

## 2021-01-01 RX ADMIN — CARBIDOPA AND LEVODOPA 1 TABLET: 25; 100 TABLET ORAL at 22:18

## 2021-01-01 RX ADMIN — AMIODARONE HYDROCHLORIDE 400 MG: 200 TABLET ORAL at 17:29

## 2021-01-01 RX ADMIN — Medication 10 ML: at 18:02

## 2021-01-01 RX ADMIN — METOPROLOL SUCCINATE 25 MG: 25 TABLET, EXTENDED RELEASE ORAL at 10:09

## 2021-01-01 RX ADMIN — Medication 1 TABLET: at 09:26

## 2021-01-01 RX ADMIN — ACETAMINOPHEN 650 MG: 325 TABLET ORAL at 13:33

## 2021-01-01 RX ADMIN — CARBIDOPA AND LEVODOPA 1 TABLET: 25; 100 TABLET ORAL at 21:34

## 2021-01-01 RX ADMIN — HYDROCODONE BITARTRATE AND ACETAMINOPHEN 1 TABLET: 5; 325 TABLET ORAL at 21:56

## 2021-01-01 RX ADMIN — SODIUM CHLORIDE 75 ML/HR: 9 INJECTION, SOLUTION INTRAVENOUS at 17:30

## 2021-01-01 RX ADMIN — Medication 10 ML: at 16:22

## 2021-01-01 RX ADMIN — FAMOTIDINE 20 MG: 20 TABLET ORAL at 09:16

## 2021-01-01 RX ADMIN — Medication 10 ML: at 13:09

## 2021-01-01 RX ADMIN — CARBIDOPA AND LEVODOPA 1 TABLET: 25; 100 TABLET ORAL at 09:19

## 2021-01-01 RX ADMIN — DOXYCYCLINE 100 MG: 100 INJECTION, POWDER, LYOPHILIZED, FOR SOLUTION INTRAVENOUS at 21:08

## 2021-01-01 RX ADMIN — SODIUM CHLORIDE 75 ML/HR: 9 INJECTION, SOLUTION INTRAVENOUS at 22:28

## 2021-01-01 RX ADMIN — CARBIDOPA AND LEVODOPA 1 TABLET: 25; 100 TABLET ORAL at 09:08

## 2021-01-01 RX ADMIN — Medication 10 ML: at 17:00

## 2021-01-01 RX ADMIN — SODIUM CHLORIDE 75 ML/HR: 9 INJECTION, SOLUTION INTRAVENOUS at 21:08

## 2021-01-01 RX ADMIN — CARBIDOPA AND LEVODOPA 1 TABLET: 25; 100 TABLET ORAL at 15:30

## 2021-01-01 RX ADMIN — ASPIRIN 81 MG CHEWABLE TABLET 81 MG: 81 TABLET CHEWABLE at 10:09

## 2021-01-01 RX ADMIN — PRAVASTATIN SODIUM 80 MG: 40 TABLET ORAL at 11:47

## 2021-01-01 RX ADMIN — AMIODARONE HYDROCHLORIDE 400 MG: 200 TABLET ORAL at 08:38

## 2021-01-01 RX ADMIN — ASPIRIN 81 MG: 81 TABLET, CHEWABLE ORAL at 09:15

## 2021-01-01 RX ADMIN — Medication 10 ML: at 21:08

## 2021-01-01 RX ADMIN — SUCCINYLCHOLINE CHLORIDE 160 MG: 20 INJECTION, SOLUTION INTRAMUSCULAR; INTRAVENOUS at 14:00

## 2021-01-01 RX ADMIN — DOXYCYCLINE HYCLATE 100 MG: 100 TABLET, COATED ORAL at 11:47

## 2021-01-01 RX ADMIN — FAMOTIDINE 20 MG: 20 TABLET ORAL at 09:00

## 2021-01-01 RX ADMIN — METOPROLOL TARTRATE 12.5 MG: 25 TABLET, FILM COATED ORAL at 09:08

## 2021-01-01 RX ADMIN — AMIODARONE HYDROCHLORIDE 0.5 MG/MIN: 50 INJECTION, SOLUTION INTRAVENOUS at 22:09

## 2021-01-01 RX ADMIN — METOPROLOL SUCCINATE 25 MG: 25 TABLET, EXTENDED RELEASE ORAL at 08:36

## 2021-01-01 RX ADMIN — ENOXAPARIN SODIUM 40 MG: 40 INJECTION SUBCUTANEOUS at 21:32

## 2021-01-01 RX ADMIN — CARBIDOPA AND LEVODOPA 1 TABLET: 25; 100 TABLET ORAL at 21:46

## 2021-01-01 RX ADMIN — CEFTRIAXONE SODIUM 2 G: 2 INJECTION, POWDER, FOR SOLUTION INTRAMUSCULAR; INTRAVENOUS at 22:34

## 2021-01-01 RX ADMIN — AMIODARONE HYDROCHLORIDE 150 MG: 1.5 INJECTION, SOLUTION INTRAVENOUS at 22:07

## 2021-01-01 RX ADMIN — AMIODARONE HYDROCHLORIDE 400 MG: 200 TABLET ORAL at 22:25

## 2021-01-01 RX ADMIN — HYDROCODONE BITARTRATE AND ACETAMINOPHEN 1 TABLET: 5; 325 TABLET ORAL at 08:50

## 2021-01-01 RX ADMIN — FAMOTIDINE 20 MG: 20 TABLET ORAL at 09:26

## 2021-01-01 RX ADMIN — ENOXAPARIN SODIUM 40 MG: 40 INJECTION SUBCUTANEOUS at 21:35

## 2021-01-01 RX ADMIN — ROCURONIUM BROMIDE 15 MG: 10 INJECTION INTRAVENOUS at 14:04

## 2021-01-01 RX ADMIN — SODIUM CHLORIDE, POTASSIUM CHLORIDE, SODIUM LACTATE AND CALCIUM CHLORIDE 25 ML/HR: 600; 310; 30; 20 INJECTION, SOLUTION INTRAVENOUS at 13:41

## 2021-01-01 RX ADMIN — ASPIRIN 81 MG CHEWABLE TABLET 81 MG: 81 TABLET CHEWABLE at 11:47

## 2021-01-01 RX ADMIN — AMLODIPINE BESYLATE 10 MG: 5 TABLET ORAL at 09:25

## 2021-01-01 RX ADMIN — METOPROLOL SUCCINATE 25 MG: 25 TABLET, EXTENDED RELEASE ORAL at 09:25

## 2021-01-01 RX ADMIN — ACETAMINOPHEN 650 MG: 325 TABLET ORAL at 13:41

## 2021-01-01 RX ADMIN — PRAVASTATIN SODIUM 80 MG: 40 TABLET ORAL at 09:16

## 2021-01-01 RX ADMIN — SODIUM CHLORIDE 50 MCG/MIN: 900 INJECTION, SOLUTION INTRAVENOUS at 14:19

## 2021-01-01 RX ADMIN — POTASSIUM CHLORIDE 10 MEQ: 10 INJECTION, SOLUTION INTRAVENOUS at 18:44

## 2021-01-01 RX ADMIN — CARBIDOPA AND LEVODOPA 1 TABLET: 25; 100 TABLET ORAL at 08:44

## 2021-01-01 RX ADMIN — TRAMADOL HYDROCHLORIDE 50 MG: 50 TABLET, FILM COATED ORAL at 15:30

## 2021-01-01 RX ADMIN — ACETAMINOPHEN 650 MG: 325 TABLET ORAL at 17:28

## 2021-01-01 RX ADMIN — ACETAMINOPHEN 650 MG: 325 TABLET ORAL at 00:56

## 2021-01-01 RX ADMIN — AMIODARONE HYDROCHLORIDE 400 MG: 200 TABLET ORAL at 21:47

## 2021-01-01 RX ADMIN — POLYETHYLENE GLYCOL 3350 17 G: 17 POWDER, FOR SOLUTION ORAL at 09:14

## 2021-01-01 RX ADMIN — ACETAMINOPHEN 650 MG: 325 TABLET ORAL at 12:18

## 2021-01-01 RX ADMIN — LIDOCAINE HYDROCHLORIDE 100 MG: 20 INJECTION, SOLUTION INTRAVENOUS at 13:59

## 2021-01-01 RX ADMIN — METOPROLOL TARTRATE 12.5 MG: 25 TABLET, FILM COATED ORAL at 18:05

## 2021-01-01 RX ADMIN — CEFTRIAXONE SODIUM 2 G: 2 INJECTION, POWDER, FOR SOLUTION INTRAMUSCULAR; INTRAVENOUS at 20:40

## 2021-01-01 RX ADMIN — ENOXAPARIN SODIUM 40 MG: 40 INJECTION SUBCUTANEOUS at 20:43

## 2021-01-01 RX ADMIN — CITALOPRAM HYDROBROMIDE 10 MG: 20 TABLET ORAL at 22:17

## 2021-01-01 RX ADMIN — Medication 10 MG: at 14:19

## 2021-01-01 RX ADMIN — Medication 10 ML: at 15:16

## 2021-01-01 RX ADMIN — SODIUM CHLORIDE 500 ML: 9 INJECTION, SOLUTION INTRAVENOUS at 06:30

## 2021-01-01 RX ADMIN — ENOXAPARIN SODIUM 40 MG: 40 INJECTION SUBCUTANEOUS at 21:11

## 2021-01-01 RX ADMIN — AMIODARONE HYDROCHLORIDE 400 MG: 200 TABLET ORAL at 16:21

## 2021-01-01 RX ADMIN — DOXYCYCLINE HYCLATE 100 MG: 100 TABLET, COATED ORAL at 09:08

## 2021-01-01 RX ADMIN — Medication 10 ML: at 22:00

## 2021-01-01 RX ADMIN — HYDROCODONE BITARTRATE AND ACETAMINOPHEN 1 TABLET: 5; 325 TABLET ORAL at 23:10

## 2021-01-01 RX ADMIN — CARBIDOPA AND LEVODOPA 1 TABLET: 25; 100 TABLET ORAL at 08:45

## 2021-01-01 RX ADMIN — AMLODIPINE BESYLATE 10 MG: 5 TABLET ORAL at 09:15

## 2021-01-01 RX ADMIN — PRAVASTATIN SODIUM 80 MG: 40 TABLET ORAL at 08:44

## 2021-01-01 RX ADMIN — SODIUM CHLORIDE 75 ML/HR: 9 INJECTION, SOLUTION INTRAVENOUS at 17:19

## 2021-01-01 RX ADMIN — AMIODARONE HYDROCHLORIDE 400 MG: 200 TABLET ORAL at 08:44

## 2021-01-01 RX ADMIN — CARBIDOPA AND LEVODOPA 1 TABLET: 25; 100 TABLET ORAL at 21:44

## 2021-01-01 RX ADMIN — DOXYCYCLINE HYCLATE 100 MG: 100 TABLET, COATED ORAL at 21:32

## 2021-01-01 RX ADMIN — Medication 10 ML: at 21:27

## 2021-01-01 RX ADMIN — ENOXAPARIN SODIUM 40 MG: 40 INJECTION SUBCUTANEOUS at 22:25

## 2021-01-01 RX ADMIN — Medication 1 TABLET: at 11:15

## 2021-01-01 RX ADMIN — CARBIDOPA AND LEVODOPA 1 TABLET: 25; 100 TABLET ORAL at 17:25

## 2021-01-01 RX ADMIN — ACETAMINOPHEN 650 MG: 325 TABLET ORAL at 22:28

## 2021-01-01 RX ADMIN — ASPIRIN 81 MG: 81 TABLET, CHEWABLE ORAL at 09:25

## 2021-01-01 RX ADMIN — TRAMADOL HYDROCHLORIDE 50 MG: 50 TABLET, FILM COATED ORAL at 18:58

## 2021-01-01 RX ADMIN — DOCUSATE SODIUM AND SENNOSIDES 2 TABLET: 8.6; 5 TABLET, FILM COATED ORAL at 09:25

## 2021-01-01 RX ADMIN — PRAVASTATIN SODIUM 80 MG: 40 TABLET ORAL at 09:25

## 2021-01-01 RX ADMIN — ASPIRIN 81 MG CHEWABLE TABLET 81 MG: 81 TABLET CHEWABLE at 08:44

## 2021-01-01 RX ADMIN — DOXYCYCLINE 100 MG: 100 INJECTION, POWDER, LYOPHILIZED, FOR SOLUTION INTRAVENOUS at 10:09

## 2021-01-01 RX ADMIN — DOXYCYCLINE 100 MG: 100 INJECTION, POWDER, LYOPHILIZED, FOR SOLUTION INTRAVENOUS at 08:44

## 2021-01-01 RX ADMIN — ACETAMINOPHEN 650 MG: 325 TABLET ORAL at 05:38

## 2021-01-01 RX ADMIN — SODIUM CHLORIDE 125 ML/HR: 9 INJECTION, SOLUTION INTRAVENOUS at 23:44

## 2021-01-01 RX ADMIN — ENOXAPARIN SODIUM 30 MG: 100 INJECTION SUBCUTANEOUS at 09:00

## 2021-01-01 RX ADMIN — METOPROLOL TARTRATE 2.5 MG: 5 INJECTION INTRAVENOUS at 06:41

## 2021-01-01 RX ADMIN — Medication 10 ML: at 22:34

## 2021-01-01 RX ADMIN — AMLODIPINE BESYLATE 10 MG: 5 TABLET ORAL at 10:09

## 2021-01-01 RX ADMIN — AMIODARONE HYDROCHLORIDE 400 MG: 200 TABLET ORAL at 22:00

## 2021-01-01 RX ADMIN — SODIUM CHLORIDE: 234 INJECTION, SOLUTION INTRAVENOUS at 22:14

## 2021-01-01 RX ADMIN — Medication 3 AMPULE: at 13:41

## 2021-01-01 RX ADMIN — Medication 10 ML: at 06:11

## 2021-01-01 RX ADMIN — ENOXAPARIN SODIUM 30 MG: 100 INJECTION SUBCUTANEOUS at 09:15

## 2021-01-01 RX ADMIN — Medication 10 ML: at 21:33

## 2021-01-01 RX ADMIN — CARBIDOPA AND LEVODOPA 1 TABLET: 25; 100 TABLET ORAL at 08:36

## 2021-01-01 RX ADMIN — DOCUSATE SODIUM AND SENNOSIDES 2 TABLET: 8.6; 5 TABLET, FILM COATED ORAL at 18:01

## 2021-01-01 RX ADMIN — DOXYCYCLINE HYCLATE 100 MG: 100 TABLET, COATED ORAL at 22:25

## 2021-01-01 RX ADMIN — ACETAMINOPHEN 650 MG: 325 TABLET ORAL at 23:44

## 2021-01-01 RX ADMIN — DOXYCYCLINE 100 MG: 100 INJECTION, POWDER, LYOPHILIZED, FOR SOLUTION INTRAVENOUS at 22:34

## 2021-01-01 RX ADMIN — HYDROCODONE BITARTRATE AND ACETAMINOPHEN 1 TABLET: 5; 325 TABLET ORAL at 16:04

## 2021-01-01 RX ADMIN — CARBIDOPA AND LEVODOPA 1 TABLET: 25; 100 TABLET ORAL at 08:50

## 2021-01-01 RX ADMIN — GLYCOPYRROLATE 0.4 MG: 0.2 INJECTION, SOLUTION INTRAMUSCULAR; INTRAVENOUS at 14:51

## 2021-01-01 RX ADMIN — POLYETHYLENE GLYCOL 3350 17 G: 17 POWDER, FOR SOLUTION ORAL at 09:26

## 2021-01-01 RX ADMIN — HYDROCODONE BITARTRATE AND ACETAMINOPHEN 1 TABLET: 5; 325 TABLET ORAL at 04:32

## 2021-01-01 RX ADMIN — ASPIRIN 81 MG CHEWABLE TABLET 81 MG: 81 TABLET CHEWABLE at 09:19

## 2021-01-01 RX ADMIN — DOXYCYCLINE HYCLATE 100 MG: 100 TABLET, COATED ORAL at 12:23

## 2021-01-01 RX ADMIN — SODIUM CHLORIDE 75 ML/HR: 9 INJECTION, SOLUTION INTRAVENOUS at 10:09

## 2021-01-01 RX ADMIN — CARBIDOPA AND LEVODOPA 1 TABLET: 25; 100 TABLET ORAL at 22:07

## 2021-01-01 RX ADMIN — METOPROLOL TARTRATE 12.5 MG: 25 TABLET, FILM COATED ORAL at 17:25

## 2021-01-01 RX ADMIN — Medication 1 TABLET: at 16:25

## 2021-01-01 RX ADMIN — CITALOPRAM HYDROBROMIDE 10 MG: 20 TABLET ORAL at 18:02

## 2021-01-01 RX ADMIN — CARBIDOPA AND LEVODOPA 1 TABLET: 25; 100 TABLET ORAL at 15:16

## 2021-01-01 RX ADMIN — DOXYCYCLINE HYCLATE 100 MG: 100 TABLET, COATED ORAL at 22:07

## 2021-01-01 RX ADMIN — CITALOPRAM HYDROBROMIDE 10 MG: 20 TABLET ORAL at 17:28

## 2021-01-01 RX ADMIN — Medication 40 MCG: at 14:19

## 2021-01-01 RX ADMIN — CARBIDOPA AND LEVODOPA 1 TABLET: 25; 100 TABLET ORAL at 16:13

## 2021-01-01 RX ADMIN — SODIUM CHLORIDE 150 ML/HR: 9 INJECTION, SOLUTION INTRAVENOUS at 16:22

## 2021-01-01 RX ADMIN — METOPROLOL TARTRATE 2.5 MG: 5 INJECTION INTRAVENOUS at 10:09

## 2021-01-01 RX ADMIN — CARBIDOPA AND LEVODOPA 1 TABLET: 25; 100 TABLET ORAL at 09:25

## 2021-01-01 RX ADMIN — HYDROCODONE BITARTRATE AND ACETAMINOPHEN 1 TABLET: 5; 325 TABLET ORAL at 05:34

## 2021-01-01 RX ADMIN — HYDROCODONE BITARTRATE AND ACETAMINOPHEN 1 TABLET: 5; 325 TABLET ORAL at 16:21

## 2021-01-01 RX ADMIN — AMIODARONE HYDROCHLORIDE 400 MG: 200 TABLET ORAL at 08:50

## 2021-01-01 RX ADMIN — Medication 10 ML: at 22:28

## 2021-01-01 RX ADMIN — CARBIDOPA AND LEVODOPA 1 TABLET: 25; 100 TABLET ORAL at 08:38

## 2021-01-01 RX ADMIN — FENTANYL CITRATE 25 MCG: 50 INJECTION, SOLUTION INTRAMUSCULAR; INTRAVENOUS at 13:59

## 2021-01-01 RX ADMIN — FENTANYL CITRATE 50 MCG: 50 INJECTION, SOLUTION INTRAMUSCULAR; INTRAVENOUS at 14:21

## 2021-01-01 RX ADMIN — METOPROLOL TARTRATE 2.5 MG: 5 INJECTION INTRAVENOUS at 00:47

## 2021-01-01 RX ADMIN — SODIUM CHLORIDE 75 ML/HR: 9 INJECTION, SOLUTION INTRAVENOUS at 06:12

## 2021-01-01 RX ADMIN — PROPOFOL 50 MG: 10 INJECTION, EMULSION INTRAVENOUS at 13:59

## 2021-01-01 RX ADMIN — AMIODARONE HYDROCHLORIDE 150 MG: 1.5 INJECTION, SOLUTION INTRAVENOUS at 16:08

## 2021-01-01 RX ADMIN — ENOXAPARIN SODIUM 40 MG: 40 INJECTION SUBCUTANEOUS at 22:07

## 2021-01-01 RX ADMIN — ACETAMINOPHEN 650 MG: 325 TABLET ORAL at 06:00

## 2021-01-01 RX ADMIN — METOPROLOL TARTRATE 2.5 MG: 5 INJECTION INTRAVENOUS at 15:24

## 2021-01-01 RX ADMIN — FENTANYL CITRATE 25 MCG: 50 INJECTION, SOLUTION INTRAMUSCULAR; INTRAVENOUS at 14:04

## 2021-01-01 RX ADMIN — FENTANYL CITRATE 50 MCG: 50 INJECTION, SOLUTION INTRAMUSCULAR; INTRAVENOUS at 15:13

## 2021-01-01 RX ADMIN — ACETAMINOPHEN 650 MG: 325 TABLET ORAL at 22:20

## 2021-01-01 RX ADMIN — Medication 1 TABLET: at 17:28

## 2021-01-01 RX ADMIN — FAMOTIDINE 20 MG: 20 TABLET ORAL at 10:09

## 2021-01-01 RX ADMIN — CEFTRIAXONE SODIUM 2 G: 2 INJECTION, POWDER, FOR SOLUTION INTRAMUSCULAR; INTRAVENOUS at 20:07

## 2021-01-01 RX ADMIN — PRAVASTATIN SODIUM 80 MG: 40 TABLET ORAL at 08:42

## 2021-01-01 RX ADMIN — Medication 1 TABLET: at 10:09

## 2021-01-01 RX ADMIN — PRAVASTATIN SODIUM 80 MG: 40 TABLET ORAL at 09:09

## 2021-01-01 RX ADMIN — METOPROLOL TARTRATE 12.5 MG: 25 TABLET, FILM COATED ORAL at 08:46

## 2021-01-01 RX ADMIN — CARBIDOPA AND LEVODOPA 1 TABLET: 25; 100 TABLET ORAL at 08:41

## 2021-01-01 RX ADMIN — Medication 10 ML: at 21:44

## 2021-01-01 RX ADMIN — ASPIRIN 81 MG CHEWABLE TABLET 81 MG: 81 TABLET CHEWABLE at 08:50

## 2021-01-01 RX ADMIN — DOXYCYCLINE 100 MG: 100 INJECTION, POWDER, LYOPHILIZED, FOR SOLUTION INTRAVENOUS at 08:49

## 2021-01-01 RX ADMIN — ONDANSETRON HYDROCHLORIDE 4 MG: 2 INJECTION, SOLUTION INTRAMUSCULAR; INTRAVENOUS at 14:14

## 2021-01-01 RX ADMIN — CARBIDOPA AND LEVODOPA 1 TABLET: 25; 100 TABLET ORAL at 18:02

## 2021-01-01 RX ADMIN — CARBIDOPA AND LEVODOPA 1 TABLET: 25; 100 TABLET ORAL at 10:12

## 2021-01-01 RX ADMIN — FAMOTIDINE 20 MG: 20 TABLET ORAL at 08:36

## 2021-01-01 RX ADMIN — ENOXAPARIN SODIUM 30 MG: 100 INJECTION SUBCUTANEOUS at 22:25

## 2021-01-01 RX ADMIN — CARBIDOPA AND LEVODOPA 1 TABLET: 25; 100 TABLET ORAL at 22:35

## 2021-01-01 RX ADMIN — Medication 1 TABLET: at 12:18

## 2021-01-01 RX ADMIN — DOCUSATE SODIUM AND SENNOSIDES 2 TABLET: 8.6; 5 TABLET, FILM COATED ORAL at 17:28

## 2021-01-01 RX ADMIN — HYDROCODONE BITARTRATE AND ACETAMINOPHEN 1 TABLET: 5; 325 TABLET ORAL at 17:25

## 2021-01-01 RX ADMIN — Medication 10 ML: at 11:15

## 2021-01-01 RX ADMIN — CARBIDOPA AND LEVODOPA 1 TABLET: 25; 100 TABLET ORAL at 17:30

## 2021-01-01 RX ADMIN — ASPIRIN 81 MG: 81 TABLET, CHEWABLE ORAL at 08:42

## 2021-01-01 RX ADMIN — DOXYCYCLINE 100 MG: 100 INJECTION, POWDER, LYOPHILIZED, FOR SOLUTION INTRAVENOUS at 22:27

## 2021-01-01 RX ADMIN — SODIUM CHLORIDE 125 ML/HR: 9 INJECTION, SOLUTION INTRAVENOUS at 17:44

## 2021-01-01 RX ADMIN — Medication 1 TABLET: at 15:16

## 2021-01-01 RX ADMIN — CARBIDOPA AND LEVODOPA 1 TABLET: 25; 100 TABLET ORAL at 17:29

## 2021-01-01 RX ADMIN — METOPROLOL TARTRATE 12.5 MG: 25 TABLET, FILM COATED ORAL at 08:43

## 2021-03-25 PROBLEM — S72.002A CLOSED LEFT HIP FRACTURE (HCC): Status: ACTIVE | Noted: 2021-01-01

## 2021-03-25 PROBLEM — S72.009A HIP FRACTURE (HCC): Status: ACTIVE | Noted: 2021-01-01

## 2021-03-25 NOTE — H&P
Hospitalist Admission Note NAME: Dejuan Pierce :  1940 MRN:  [id number] Date/Time:  3/25/2021 4:39 PM 
 
Patient PCP: Ruthann Roca, DO 
______________________________________________________________________ Given the patient's current clinical presentation, I have a high level of concern for decompensation if discharged from the emergency department. Complex decision making was performed, which includes reviewing the patient's available past medical records, laboratory results, and x-ray films. My assessment of this patient's clinical condition and my plan of care is as follows. Assessment / Plan: 
 
Acute left hip fracture Ortho consulted After midnight for surgery in a.m. Pain control Stool softeners PT/OT after surgery Pharmacological DVT prophylaxis after surgery as per Ortho recs Parkinson disease Resume Sinemet CAD/history of CVA/hypertension/hyperlipidemia Resume aspirin statin Resume amlodipine and metoprolol CKD stage III At baseline Avoid nephrotoxic medications Code Status: full Surrogate Decision Maker: wife DVT Prophylaxis: SCDs GI Prophylaxis: not indicated Baseline: Independent Subjective: CHIEF COMPLAINT: Ground-level fall HISTORY OF PRESENT ILLNESS:    
Deya Dawkins is a 79 y.o.  male who presents with the above CC. Patient went to urgent care for constipation and while trying to get out of car he tripped and fell on his hip and started having severe hip pain for which he presented to the emergency room. X-ray showed left hip fracture. Patient reported history of CAD, hypertension, Parkinson's disease, hyperlipidemia, 4 strokes (S1 May 2019) and left lower extremity vascular stent. Patient denies any significant recent cardiac/respiratory problems. Denies having any recent surgeries.   Patient denies loss of consciousness, headache, neck pain/back pain, shortness of breath, chest pain, lower extremity edema. Blood work with BUN 21, creatinine 1.89, alkaline phosphatase 124, glucose 115, INR within normal limits. EKG with sinus rhythm and PVCs with nonspecific ST/T wave changes. Xr Chest Sngl V No acute cardiopulmonary disease. Xr Hip Lt W Or Wo Pelv 2-3 Vws Acute, minimally displaced left hip fracture. ECHO on 5/2019 Left Ventricle: Mild concentric hypertrophy. Low normal systolic dysfunction. Estimated left ventricular ejection fraction is 51 - 55%. No regional wall motion abnormality noted. Age-appropriate left ventricular diastolic function. Aortic Valve: Mild aortic valve leaflet calcification present without reduced excursion. We were asked to admit for work up and evaluation of the above problems. Past Medical History:  
Diagnosis Date  CAD (coronary artery disease)  Hypertension  Other ill-defined conditions(799.89)   
 parkinson's  Other ill-defined conditions(799.89)   
 high cholesterol  Stroke [facility]) 2006  
 left sided weakness Past Surgical History:  
Procedure Laterality Date  HX CATARACT REMOVAL Bilateral   
 HX HEART CATHETERIZATION  2006  LA COLONOSCOPY FLX DX W/COLLJ SPEC WHEN PFRMD  5/12/2014 Social History Tobacco Use  Smoking status: Former Smoker  Smokeless tobacco: Never Used Substance Use Topics  Alcohol use: No  
  
 
No family history on file. No Known Allergies Prior to Admission medications Medication Sig Start Date End Date Taking? Authorizing Provider  
carbidopa-levodopa (SINEMET)  mg per tablet Take 1 Tab by mouth three (3) times daily. 5/10/19   Jimbo Odonnell MD  
metoprolol succinate (TOPROL-XL) 25 mg XL tablet Take 1 Tab by mouth daily. 5/11/19   Jimbo Odonnell MD  
pravastatin (PRAVACHOL) 80 mg tablet Take 1 Tab by mouth daily.  5/10/19   Jimbo Odonnell MD  
polyethylene glycol (MIRALAX) 17 gram/dose powder Take 17 g by mouth daily as needed (constipation). Provider, Historical  
calcium-cholecalciferol, D3, (CALTRATE 600+D) tablet Take 1 Tab by mouth daily. Provider, Historical  
citalopram (CELEXA) 10 mg tablet Take 10 mg by mouth every evening. Other, MD Sima  
raNITIdine (ZANTAC) 150 mg tablet Take 150 mg by mouth daily. Other, MD Sima  
amLODIPine (NORVASC) 10 mg tablet Take 10 mg by mouth daily. Provider, Historical  
aspirin 81 mg chewable tablet Take 81 mg by mouth daily. Provider, Historical  
 
 
REVIEW OF SYSTEMS:    
Total of 12 systems reviewed, negative except for the above Objective: VITALS:   
Visit Vitals BP (!) 164/122 (BP 1 Location: Left upper arm, BP Patient Position: At rest) Pulse (!) 113 Temp 98.4 °F (36.9 °C) Resp 17 SpO2 100% No intake or output data in the 24 hours ending 03/25/21 1639 Wt Readings from Last 10 Encounters:  
06/18/19 76.2 kg (168 lb)  
06/14/19 84.8 kg (187 lb) 05/07/19 80.2 kg (176 lb 12 oz) 04/09/18 77.1 kg (170 lb) 02/10/18 77.2 kg (170 lb 3.1 oz) 12/30/17 86.4 kg (190 lb 7.6 oz) 05/12/14 83.2 kg (183 lb 7 oz) PHYSICAL EXAM: 
 
General:    Alert, cooperative, no distress, appears stated age. HEENT: Atraumatic, anicteric sclerae, pink conjunctivae, MMM Neck:  Supple, symmetrical 
Lungs:   CTA. No Wheezing/Rhonchi. No rales. No tenderness  No Accessory muscle use. Heart:   Regular rhythm. No murmur. No JVD Abdomen/: Amezcua catheter inserted in ER. Soft, NT. ND.  BS normal 
Extremities: No edema. No cyanosis. No clubbing. Capillary refill normal.  Left hip tenderness Skin:     Not pale. Not Jaundiced. No rashes Psych:  Good insight. Not depressed. Not anxious or agitated. Neurologic: Alert and oriented X 4. EOMs intact. No facial asymmetry. Mild slurred speech, chronic. Symmetrical strength, Sensation grossly intact.  
 
_______________________________________________________________________ Care Plan discussed with: 
  Comments Patient x Family RN Care Manager Consultant:     
_______________________________________________________________________ Expected  Disposition:  
Home with Family HH/PT/OT/RN   
SNF/LTC x  
JULIO   
________________________________________________________________________ TOTAL TIME:  43 Minutes Comments  
 x Reviewed previous records  
>50% of visit spent in counseling and coordination of care x Discussion with patient and/or family and questions answered 
  
 
________________________________________________________________________ Signed: Ky Orr MD 
 
Procedures: see electronic medical records for all procedures/Xrays and details which were not copied into this note but were reviewed prior to creation of Plan. LAB DATA REVIEWED:   
Recent Results (from the past 24 hour(s)) EKG, 12 LEAD, INITIAL Collection Time: 03/25/21  2:34 PM  
Result Value Ref Range Ventricular Rate 86 BPM  
 Atrial Rate 86 BPM  
 P-R Interval 160 ms QRS Duration 86 ms  
 Q-T Interval 360 ms QTC Calculation (Bezet) 430 ms Calculated P Axis 30 degrees Calculated R Axis -4 degrees Calculated T Axis 71 degrees Diagnosis Sinus rhythm with occasional premature ventricular complexes and premature  
atrial complexes Nonspecific ST and T wave abnormality When compared with ECG of 14-JUN-2019 20:29, 
premature ventricular complexes are now present 
premature atrial complexes are now present Vent. rate has increased BY  32 BPM 
Nonspecific T wave abnormality, worse in Inferior leads CBC WITH AUTOMATED DIFF Collection Time: 03/25/21  3:14 PM  
Result Value Ref Range WBC 10.0 4.1 - 11.1 K/uL  
 RBC 4.46 4.10 - 5.70 M/uL  
 HGB 13.5 12.1 - 17.0 g/dL HCT 40.6 36.6 - 50.3 % MCV 91.0 80.0 - 99.0 FL  
 MCH 30.3 26.0 - 34.0 PG  
 MCHC 33.3 30.0 - 36.5 g/dL  
 RDW 13.1 11.5 - 14.5 % PLATELET 880 644 - 565 K/uL  MPV 10.3 8.9 - 12.9 FL  
 NRBC 0.0 0  WBC ABSOLUTE NRBC 0.00 0.00 - 0.01 K/uL NEUTROPHILS 88 (H) 32 - 75 % LYMPHOCYTES 6 (L) 12 - 49 % MONOCYTES 6 5 - 13 % EOSINOPHILS 0 0 - 7 % BASOPHILS 0 0 - 1 % IMMATURE GRANULOCYTES 0 0.0 - 0.5 % ABS. NEUTROPHILS 8.8 (H) 1.8 - 8.0 K/UL  
 ABS. LYMPHOCYTES 0.6 (L) 0.8 - 3.5 K/UL  
 ABS. MONOCYTES 0.6 0.0 - 1.0 K/UL  
 ABS. EOSINOPHILS 0.0 0.0 - 0.4 K/UL  
 ABS. BASOPHILS 0.0 0.0 - 0.1 K/UL  
 ABS. IMM. GRANS. 0.0 0.00 - 0.04 K/UL  
 DF SMEAR SCANNED    
 RBC COMMENTS NORMOCYTIC, NORMOCHROMIC    
TYPE & SCREEN Collection Time: 03/25/21  3:14 PM  
Result Value Ref Range Crossmatch Expiration 03/28/2021,2359 ABO/Rh(D) B POSITIVE Antibody screen NEG PROTHROMBIN TIME + INR Collection Time: 03/25/21  3:14 PM  
Result Value Ref Range INR 1.0 0.9 - 1.1 Prothrombin time 10.9 9.0 - 11.1 sec METABOLIC PANEL, COMPREHENSIVE Collection Time: 03/25/21  3:14 PM  
Result Value Ref Range Sodium 136 136 - 145 mmol/L Potassium 4.2 3.5 - 5.1 mmol/L Chloride 106 97 - 108 mmol/L  
 CO2 27 21 - 32 mmol/L Anion gap 3 (L) 5 - 15 mmol/L Glucose 115 (H) 65 - 100 mg/dL BUN 21 (H) 6 - 20 MG/DL Creatinine 1.89 (H) 0.70 - 1.30 MG/DL  
 BUN/Creatinine ratio 11 (L) 12 - 20 GFR est AA 42 (L) >60 ml/min/1.73m2 GFR est non-AA 35 (L) >60 ml/min/1.73m2 Calcium 9.9 8.5 - 10.1 MG/DL Bilirubin, total 0.9 0.2 - 1.0 MG/DL  
 ALT (SGPT) 34 12 - 78 U/L  
 AST (SGOT) 20 15 - 37 U/L Alk. phosphatase 124 (H) 45 - 117 U/L Protein, total 8.3 (H) 6.4 - 8.2 g/dL Albumin 4.1 3.5 - 5.0 g/dL Globulin 4.2 (H) 2.0 - 4.0 g/dL A-G Ratio 1.0 (L) 1.1 - 2.2 Xr Chest Sngl V Result Date: 3/25/2021 No acute cardiopulmonary disease. Xr Hip Lt W Or Wo Pelv 2-3 Vws Result Date: 3/25/2021 Acute, minimally displaced left hip fracture, as described above.

## 2021-03-25 NOTE — ED PROVIDER NOTES
EMERGENCY DEPARTMENT HISTORY AND PHYSICAL EXAM 
 
 
Date: 3/25/2021 Patient Name: Heidy Rodríugez History of Presenting Illness Chief Complaint Patient presents with  Referral / Consult  
  sent from urgent care where they took an x-ray that showed a L femur fx. Pain in L hand L femur History Provided By: Patient and Patient's Wife HPI: Heidy Rdoríguez, 79 y.o. male with PMHx significant for CAD, hypertension, hyperlipidemia, Parkinson's disease, who presents with a chief complaint of left hip pain. Patient reports he was going to [facility] today due to ongoing issues with constipation. [facility] he [facility] and fell, landing on his left hip. Had immediate pain in his left hip and was unable to ambulate. Wife reports that [facility] that showed a \"femur fracture. \"  He denies any headache or head trauma. No LOC. No other associated injuries. PCP: Mariaa Matthews DO There are no other complaints, changes, or physical findings at this time. Current Facility-Administered Medications Medication Dose Route Frequency Provider Last Rate Last Admin  
 0.9% sodium chloride infusion  75 mL/hr IntraVENous CONTINUOUS Ignacio Oswald PA-C 75 mL/hr at 03/25/21 1719 75 mL/hr at 03/25/21 1719  acetaminophen (TYLENOL) tablet 650 mg  650 mg Oral Q6H Sara Walker PA-C   650 mg at 03/25/21 1722  oxyCODONE IR (ROXICODONE) tablet 2.5 mg  2.5 mg Oral Q4H PRN Ignacio Oswald PA-C      
 oxyCODONE IR (ROXICODONE) tablet 5 mg  5 mg Oral Q4H PRN Dewayne Cruz PA-C      
 naloxone [facility]) injection 0.4 mg  0.4 mg IntraVENous PRN Ignacio Oswald PA-C      
 senna-docusate (PERICOLACE) 8.6-50 mg per tablet 2 Tab  2 Tab Oral BID Dewayne Cruz PA-C      
 ceFAZolin (ANCEF) 2 g in sterile water (preservative free) 20 mL IV syringe  2 g IntraVENous ON CALL TO OR Dewayne Cruz PA-C      
 HYDROmorphone (DILAUDID) injection 0.5-1 mg  0.5-1 mg IntraVENous Q2H PRN Kirill Sheehan PA-C Past History Past Medical History: 
Past Medical History:  
Diagnosis Date  CAD (coronary artery disease)  Hypertension  Other ill-defined conditions(799.89)   
 parkinson's  Other ill-defined conditions(799.89)   
 high cholesterol  Stroke [facility]) 2006  
 left sided weakness Past Surgical History: 
Past Surgical History:  
Procedure Laterality Date  HX CATARACT REMOVAL Bilateral   
 HX HEART CATHETERIZATION  2006  AL COLONOSCOPY FLX DX W/COLLJ SPEC WHEN PFRMD  5/12/2014 Family History: No family history on file. Social History: 
Social History Tobacco Use  Smoking status: Former Smoker  Smokeless tobacco: Never Used Substance Use Topics  Alcohol use: No  
 Drug use: Never Allergies: 
No Known Allergies Review of Systems Review of Systems Constitutional: Negative for chills and fever. HENT: Negative for congestion, rhinorrhea and sore throat. Respiratory: Negative for cough and shortness of breath. Cardiovascular: Negative for chest pain. Gastrointestinal: Positive for constipation. Negative for abdominal pain, nausea and vomiting. Genitourinary: Negative for dysuria and urgency. Musculoskeletal: Positive for arthralgias. Skin: Negative for rash. Neurological: Negative for dizziness, light-headedness and headaches. All other systems reviewed and are negative. Physical Exam  
Physical Exam 
Vitals signs and nursing note reviewed. Constitutional:   
   General: He is not in acute distress. Appearance: He is well-developed. HENT:  
   Head: Normocephalic and atraumatic. Eyes:  
   Conjunctiva/sclera: Conjunctivae normal.  
   Pupils: Pupils are equal, round, and reactive to light. Neck: Musculoskeletal: Normal range of motion. Cardiovascular:  
   Rate and Rhythm: Normal rate and regular rhythm.   
Pulmonary:  
   Effort: Pulmonary effort is normal. No respiratory distress. Breath sounds: Normal breath sounds. No stridor. Abdominal:  
   General: There is no distension. Palpations: Abdomen is soft. Tenderness: There is no abdominal tenderness. Musculoskeletal:  
   Comments: ttp over left hip, ROM limited by pain Skin: 
   General: Skin is warm and dry. Neurological:  
   Mental Status: He is alert and oriented to person, place, and time. Diagnostic Study Results Labs - Recent Results (from the past 12 hour(s)) EKG, 12 LEAD, INITIAL Collection Time: 03/25/21  2:34 PM  
Result Value Ref Range Ventricular Rate 86 BPM  
 Atrial Rate 86 BPM  
 P-R Interval 160 ms QRS Duration 86 ms  
 Q-T Interval 360 ms QTC Calculation (Bezet) 430 ms Calculated P Axis 30 degrees Calculated R Axis -4 degrees Calculated T Axis 71 degrees Diagnosis Sinus rhythm with occasional premature ventricular complexes and premature  
atrial complexes Nonspecific ST and T wave abnormality When compared with ECG of 14-JUN-2019 20:29, 
premature ventricular complexes are now present 
premature atrial complexes are now present Vent. rate has increased BY  32 BPM 
Nonspecific T wave abnormality, worse in Inferior leads CBC WITH AUTOMATED DIFF Collection Time: 03/25/21  3:14 PM  
Result Value Ref Range WBC 10.0 4.1 - 11.1 K/uL  
 RBC 4.46 4.10 - 5.70 M/uL  
 HGB 13.5 12.1 - 17.0 g/dL HCT 40.6 36.6 - 50.3 % MCV 91.0 80.0 - 99.0 FL  
 MCH 30.3 26.0 - 34.0 PG  
 MCHC 33.3 30.0 - 36.5 g/dL  
 RDW 13.1 11.5 - 14.5 % PLATELET 965 584 - 478 K/uL MPV 10.3 8.9 - 12.9 FL  
 NRBC 0.0 0  WBC ABSOLUTE NRBC 0.00 0.00 - 0.01 K/uL NEUTROPHILS 88 (H) 32 - 75 % LYMPHOCYTES 6 (L) 12 - 49 % MONOCYTES 6 5 - 13 % EOSINOPHILS 0 0 - 7 % BASOPHILS 0 0 - 1 % IMMATURE GRANULOCYTES 0 0.0 - 0.5 % ABS. NEUTROPHILS 8.8 (H) 1.8 - 8.0 K/UL  
 ABS. LYMPHOCYTES 0.6 (L) 0.8 - 3.5 K/UL  
 ABS.  MONOCYTES 0.6 0.0 - 1.0 K/UL  
 ABS. EOSINOPHILS 0.0 0.0 - 0.4 K/UL  
 ABS. BASOPHILS 0.0 0.0 - 0.1 K/UL  
 ABS. IMM. GRANS. 0.0 0.00 - 0.04 K/UL  
 DF SMEAR SCANNED    
 RBC COMMENTS NORMOCYTIC, NORMOCHROMIC    
TYPE & SCREEN Collection Time: 03/25/21  3:14 PM  
Result Value Ref Range Crossmatch Expiration 03/28/2021,2359 ABO/Rh(D) B POSITIVE Antibody screen NEG PROTHROMBIN TIME + INR Collection Time: 03/25/21  3:14 PM  
Result Value Ref Range INR 1.0 0.9 - 1.1 Prothrombin time 10.9 9.0 - 11.1 sec METABOLIC PANEL, COMPREHENSIVE Collection Time: 03/25/21  3:14 PM  
Result Value Ref Range Sodium 136 136 - 145 mmol/L Potassium 4.2 3.5 - 5.1 mmol/L Chloride 106 97 - 108 mmol/L  
 CO2 27 21 - 32 mmol/L Anion gap 3 (L) 5 - 15 mmol/L Glucose 115 (H) 65 - 100 mg/dL BUN 21 (H) 6 - 20 MG/DL Creatinine 1.89 (H) 0.70 - 1.30 MG/DL  
 BUN/Creatinine ratio 11 (L) 12 - 20 GFR est AA 42 (L) >60 ml/min/1.73m2 GFR est non-AA 35 (L) >60 ml/min/1.73m2 Calcium 9.9 8.5 - 10.1 MG/DL Bilirubin, total 0.9 0.2 - 1.0 MG/DL  
 ALT (SGPT) 34 12 - 78 U/L  
 AST (SGOT) 20 15 - 37 U/L Alk. phosphatase 124 (H) 45 - 117 U/L Protein, total 8.3 (H) 6.4 - 8.2 g/dL Albumin 4.1 3.5 - 5.0 g/dL Globulin 4.2 (H) 2.0 - 4.0 g/dL A-G Ratio 1.0 (L) 1.1 - 2.2 Radiologic Studies -  
XR CHEST SNGL V Final Result No acute cardiopulmonary disease. XR HIP LT W OR WO PELV 2-3 VWS Final Result Acute, minimally displaced left hip fracture, as described above. Xr Chest Sngl V Result Date: 3/25/2021 No acute cardiopulmonary disease. Xr Hip Lt W Or Wo Pelv 2-3 Vws Result Date: 3/25/2021 Acute, minimally displaced left hip fracture, as described above. Medical Decision Making I am the first provider for this patient. I reviewed the vital signs, available nursing notes, past medical history, past surgical history, family history and social history.  
 
Vital Signs-Reviewed the patient's vital signs. Patient Vitals for the past 12 hrs: 
 Temp Pulse Resp BP SpO2  
03/25/21 1805 99.7 °F (37.6 °C) 99 16 (!) 165/95 99 % 03/25/21 1724 98.5 °F (36.9 °C) 85  (!) 152/81 93 % 03/25/21 1654    (!) 150/82   
03/25/21 1624    (!) 148/74 99 % 03/25/21 1554    (!) 143/81 98 % 03/25/21 1524    (!) 145/81 100 % 03/25/21 1454    (!) 155/80 100 % 03/25/21 1424    (!) 168/81 96 % 03/25/21 1339 98.4 °F (36.9 °C) (!) 113 17 (!) 164/122 100 % Pulse Oximetry Analysis - 99% on ra Cardiac Monitor:  
Rate: 85 bpm 
Rhythm: Normal Sinus Rhythm ED EKG interpretation: 
Rhythm: normal sinus rhythm and PAC's; and regular . Rate (approx.): 86; Axis: normal; P wave: normal; QRS interval: normal ; ST/T wave: non-specific changes; Other findings: borderline ekg. This EKG was interpreted by NAVA Alaniz MD,ED Provider. Records Reviewed: Nursing Notes and Old Medical Records Provider Notes (Medical Decision Making):  
Patient presents with a chief complaint of left hip pain after a fall. On my evaluation he has tenderness over the left hip with limited range of motion. Will repeat x-ray here. Also send preop labs, screening EKG. ED Course:  
Initial assessment performed. The patients presenting problems have been discussed, and they are in agreement with the care plan formulated and outlined with them. I have encouraged them to ask questions as they arise throughout their visit. X-ray with a hip fracture. Discussed with Dr. Aniket Norwood, orthopedics via perfect [facility]. Plan for ORIF tomorrow. Discussed with hospitalist for admission. Procedures: 
Procedures Critical Care: 
none Disposition: 
 
Admission Note: 
Patient is being admitted to the hospital by Service: Hospitalist.  The results of their tests and reasons for their admission have been discussed with them and available family.  They convey agreement and understanding for the need to be admitted and for their admission diagnosis. Diagnosis Clinical Impression: 1. Closed fracture of left hip, initial encounter ([facility] [address] Please note that this dictation was completed with [employer], the computer voice recognition software. Quite often unanticipated grammatical, syntax, homophones, and other interpretive errors are inadvertently transcribed by the computer software. Please disregard these errors. Please excuse any errors that have escaped final proofreading

## 2021-03-25 NOTE — ED NOTES
Assumed care of pt from triage. Pt is A&O x 4. Pt reports CC of broken femur per urgent care. Pt went to urgent care fro constipation and fell in the parking lot. Urgent care took xrays and dx pt with a left femur fracture and sent him to the ER.  
 
1402- MD Hand at bedside evaluating pt.  
 
1530- Pt resting on stretcher in POC with call bell in reach. Pt remains on monitor x 3. VSS at this time. 1730- placed duran and medicated per orders 1750- TRANSFER - OUT REPORT: 
 
Verbal report given to Tesha Lopez (name) on Thersia Miracle  being transferred to  Ortho (unit) for routine progression of care Report consisted of patients Situation, Background, Assessment and  
Recommendations(SBAR). Information from the following report(s) SBAR, ED Summary, [facility] and Recent Results was reviewed with the receiving nurse. Lines:  
Peripheral IV 03/25/21 Left Forearm (Active) Opportunity for questions and clarification was provided. Patient transported with: 
Transport

## 2021-03-25 NOTE — CONSULTS
Orthopedic CONSULT NOTE Subjective:  
 
Date of Consultation:  March 25, 2021 Sarah Back is a 79 y.o. male who is being seen for left hip pain and inability to ambulate after glf . Injury occurred  today while Pt. was at an urgent care appointment for constipation, fell and injured hip . Workup has revealed left hip fracture . Patient's ambulatory status includes None and their living environment is home. Pt. Denies head trauma/LOC during injury. pmh significant for parkinsons. Takes baby asa Patient Active Problem List  
 Diagnosis Date Noted  Closed left hip fracture ([facility] 03/25/2021  Cerebral aneurysm without rupture, left PCA 12 mm aneurysm 05/08/2019  Thrombotic stroke involving right middle cerebral artery ([facility] 05/06/2019  Bilateral carotid artery stenosis 05/06/2019  Left homonymous hemianopsia due to recent cerebral infarction 05/06/2019  Parkinson's disease (tremor, stiffness, slow motion, unstable posture) ([facility] 05/06/2019  Late onset Alzheimer's disease without behavioral disturbance ([facility] 05/06/2019  Stroke (cerebrum) ([facility] [facility] 05/06/2019  TIA (transient ischemic attack) 05/05/2019 No family history on file. Social History Tobacco Use  Smoking status: Former Smoker  Smokeless tobacco: Never Used Substance Use Topics  Alcohol use: No  
 
Past Medical History:  
Diagnosis Date  CAD (coronary artery disease)  Hypertension  Other ill-defined conditions(799.89)   
 parkinson's  Other ill-defined conditions(799.89)   
 high cholesterol  Stroke [facility]) 2006  
 left sided weakness Past Surgical History:  
Procedure Laterality Date  HX CATARACT REMOVAL Bilateral   
 HX HEART CATHETERIZATION  2006  AR COLONOSCOPY FLX DX W/COLLJ SPEC WHEN PFRMD  5/12/2014 Prior to Admission medications Medication Sig Start Date End Date Taking?  Authorizing Provider  
carbidopa-levodopa (SINEMET)  mg per tablet Take 1 Tab by mouth three (3) times daily. 5/10/19   Mehdi Montero MD  
metoprolol succinate (TOPROL-XL) 25 mg XL tablet Take 1 Tab by mouth daily. 5/11/19   Mehdi Montero MD  
pravastatin (PRAVACHOL) 80 mg tablet Take 1 Tab by mouth daily. 5/10/19   Mehdi Montero MD  
polyethylene glycol (MIRALAX) 17 gram/dose powder Take 17 g by mouth daily as needed (constipation). Provider, Historical  
calcium-cholecalciferol, D3, (CALTRATE 600+D) tablet Take 1 Tab by mouth daily. Provider, Historical  
citalopram (CELEXA) 10 mg tablet Take 10 mg by mouth every evening. Other, MD Sima  
raNITIdine (ZANTAC) 150 mg tablet Take 150 mg by mouth daily. Other, MD Sima  
amLODIPine (NORVASC) 10 mg tablet Take 10 mg by mouth daily. Provider, Historical  
aspirin 81 mg chewable tablet Take 81 mg by mouth daily. Provider, Historical  
 
Current Facility-Administered Medications Medication Dose Route Frequency  0.9% sodium chloride infusion  75 mL/hr IntraVENous CONTINUOUS  
 acetaminophen (TYLENOL) tablet 650 mg  650 mg Oral Q6H  
 oxyCODONE IR (ROXICODONE) tablet 2.5 mg  2.5 mg Oral Q4H PRN  
 oxyCODONE IR (ROXICODONE) tablet 5 mg  5 mg Oral Q4H PRN  
 naloxone (NARCAN) injection 0.4 mg  0.4 mg IntraVENous PRN  
 senna-docusate (PERICOLACE) 8.6-50 mg per tablet 2 Tab  2 Tab Oral BID  ceFAZolin (ANCEF) 2 g in sterile water (preservative free) 20 mL IV syringe  2 g IntraVENous ON CALL TO OR  
 HYDROmorphone (DILAUDID) injection 0.5-1 mg  0.5-1 mg IntraVENous Q2H PRN Current Outpatient Medications Medication Sig  carbidopa-levodopa (SINEMET)  mg per tablet Take 1 Tab by mouth three (3) times daily.  metoprolol succinate (TOPROL-XL) 25 mg XL tablet Take 1 Tab by mouth daily.  pravastatin (PRAVACHOL) 80 mg tablet Take 1 Tab by mouth daily.  polyethylene glycol (MIRALAX) 17 gram/dose powder Take 17 g by mouth daily as needed (constipation).   
 calcium-cholecalciferol, D3, (CALTRATE 600+D) tablet Take 1 Tab by mouth daily.  citalopram (CELEXA) 10 mg tablet Take 10 mg by mouth every evening.  raNITIdine (ZANTAC) 150 mg tablet Take 150 mg by mouth daily.  amLODIPine (NORVASC) 10 mg tablet Take 10 mg by mouth daily.  aspirin 81 mg chewable tablet Take 81 mg by mouth daily. No Known Allergies Review of Systems:  A comprehensive review of systems was negative except for that written in the HPI. Mental Status: no dementia Objective:  
 
Patient Vitals for the past 8 hrs: 
 BP Temp Pulse Resp SpO2  
21 1339 (!) 164/122 98.4 °F (36.9 °C) (!) 113 17 100 % Temp (24hrs), Av.4 °F (36.9 °C), Min:98.4 °F (36.9 °C), Max:98.4 °F (36.9 °C) EXAM: alert, cooperative, no distress, oriented, normal, normal mood, 
Pt. Is TTP over left hip 
rom limited due to pain ir and er 10 deg with pain Ff 30 with pain Unable to bear weight. . 
 
Imaging Review: XRStudy Result INDICATION: Left hip pain, status post fall. 
  
AP view of the pelvis and lateral view of the left hip. 
  
There is an acute, minimally displaced oblique fracture which extends from the 
medial left femoral neck through the left greater trochanter into the lateral 
proximal diaphysis of the left femur. No additional fracture is seen. There is 
no dislocation. Osseous structures are diffusely demineralized. 
  
IMPRESSION Acute, minimally displaced left hip fracture, as described above. Labs:  
Recent Results (from the past 24 hour(s)) EKG, 12 LEAD, INITIAL Collection Time: 21  2:34 PM  
Result Value Ref Range Ventricular Rate 86 BPM  
 Atrial Rate 86 BPM  
 P-R Interval 160 ms QRS Duration 86 ms  
 Q-T Interval 360 ms QTC Calculation (Bezet) 430 ms Calculated P Axis 30 degrees Calculated R Axis -4 degrees Calculated T Axis 71 degrees Diagnosis   Sinus rhythm with occasional premature ventricular complexes and premature  
atrial complexes Nonspecific ST and T wave abnormality When compared with ECG of 14-JUN-2019 20:29, 
premature ventricular complexes are now present 
premature atrial complexes are now present Vent. rate has increased BY  32 BPM 
Nonspecific T wave abnormality, worse in Inferior leads CBC WITH AUTOMATED DIFF Collection Time: 03/25/21  3:14 PM  
Result Value Ref Range WBC 10.0 4.1 - 11.1 K/uL  
 RBC 4.46 4.10 - 5.70 M/uL  
 HGB 13.5 12.1 - 17.0 g/dL HCT 40.6 36.6 - 50.3 % MCV 91.0 80.0 - 99.0 FL  
 MCH 30.3 26.0 - 34.0 PG  
 MCHC 33.3 30.0 - 36.5 g/dL  
 RDW 13.1 11.5 - 14.5 % PLATELET 900 310 - 607 K/uL MPV 10.3 8.9 - 12.9 FL  
 NRBC 0.0 0  WBC ABSOLUTE NRBC 0.00 0.00 - 0.01 K/uL NEUTROPHILS PENDING % LYMPHOCYTES PENDING % MONOCYTES PENDING % EOSINOPHILS PENDING % BASOPHILS PENDING % IMMATURE GRANULOCYTES PENDING %  
 ABS. NEUTROPHILS PENDING K/UL  
 ABS. LYMPHOCYTES PENDING K/UL  
 ABS. MONOCYTES PENDING K/UL  
 ABS. EOSINOPHILS PENDING K/UL  
 ABS. BASOPHILS PENDING K/UL  
 ABS. IMM. GRANS. PENDING K/UL  
 DF PENDING   
TYPE & SCREEN Collection Time: 03/25/21  3:14 PM  
Result Value Ref Range Crossmatch Expiration 03/28/2021,2359 ABO/Rh(D) B POSITIVE Antibody screen NEG METABOLIC PANEL, COMPREHENSIVE Collection Time: 03/25/21  3:14 PM  
Result Value Ref Range Sodium 136 136 - 145 mmol/L Potassium 4.2 3.5 - 5.1 mmol/L Chloride 106 97 - 108 mmol/L  
 CO2 27 21 - 32 mmol/L Anion gap 3 (L) 5 - 15 mmol/L Glucose 115 (H) 65 - 100 mg/dL BUN 21 (H) 6 - 20 MG/DL Creatinine 1.89 (H) 0.70 - 1.30 MG/DL  
 BUN/Creatinine ratio 11 (L) 12 - 20 GFR est AA 42 (L) >60 ml/min/1.73m2 GFR est non-AA 35 (L) >60 ml/min/1.73m2 Calcium 9.9 8.5 - 10.1 MG/DL Bilirubin, total 0.9 0.2 - 1.0 MG/DL  
 ALT (SGPT) 34 12 - 78 U/L  
 AST (SGOT) 20 15 - 37 U/L Alk. phosphatase 124 (H) 45 - 117 U/L Protein, total 8.3 (H) 6.4 - 8.2 g/dL  Albumin 4.1 3.5 - 5.0 g/dL Globulin 4.2 (H) 2.0 - 4.0 g/dL A-G Ratio 1.0 (L) 1.1 - 2.2 Impression:  
 
Patient Active Problem List  
 Diagnosis Date Noted  Closed left hip fracture ([facility] [facility] 03/25/2021  Cerebral aneurysm without rupture, left PCA 12 mm aneurysm 05/08/2019  Thrombotic stroke involving right middle cerebral artery ([address] 05/06/2019  Bilateral carotid artery stenosis 05/06/2019  Left homonymous hemianopsia due to recent cerebral infarction 05/06/2019  Parkinson's disease (tremor, stiffness, slow motion, unstable posture) ([address] 05/06/2019  Late onset Alzheimer's disease without behavioral disturbance ([address] 05/06/2019  Stroke (cerebrum) ([facility] 05/06/2019  TIA (transient ischemic attack) 05/05/2019 Principal Problem: 
  Closed left hip fracture ([facility] [address] (3/25/2021) Plan:  
Admit to medicine Clear for orif Consent for left hip cannulated screws Surgery tomorrow if cleared Dr. Kay Barber aware and agree with above plan.  
 
 
Edwina Wesley PA-C

## 2021-03-25 NOTE — PROGRESS NOTES
Bedside and Verbal shift change report given to Bob (oncoming nurse) by ANGELI Street RN (offgoing nurse). Report given with SBAR, Kardex, Intake/Output, MAR and Recent Results.

## 2021-03-26 NOTE — PROGRESS NOTES
ORTHO - Progress Note Post Op day: Day of Surgery Bowen Lepe     [id number]  male    79 y.o.    1940 Admit date:3/25/2021 Date of Surgery:3/26/2021 Procedures: Planned---   LEFT HIP PERCUTANEOUS CANNULATED SCREWS (URGENT) Surgeon:Surgeon(s) and Role:   * Ashley Justice, DO - Primary SUBJECTIVE: 
  
Bowen Leep is a 79 y.o. male resting in the bed  Patient has complaints of left hip pain, when moving his LLE or shifting around in bed. OBJECTIVE: 
 
  
Physical Exam: 
General: alert, cooperative, no distress. Baseline- delayed verbal response(parkinson and prior cva) Gastrointestinal:  non-distended. Cardiovascular: equal pulses in the lower extremities,  Brisk cap refill in all distal extremities Respiratory: No respiratory distress Neurological:Neurovascular exam within normal limits. Senstion intact: LE bilat. Motor: + DF/PF/EHL. Musculoskeletal: Tree's sign negative in bilateral lower extremities. Calves soft, supple, non-tender upon palpation or with passive stretch. Vital Signs:  
  
  
Patient Vitals for the past 8 hrs: 
 BP Temp Pulse Resp SpO2  
21 0802 (!) 158/81 98.4 °F (36.9 °C) 77 17 97 % 21 0400 (!) 173/94 98.2 °F (36.8 °C) 86 16 98 % Temp (24hrs), Av.4 °F (36.9 °C), Min:97.4 °F (36.3 °C), Max:99.7 °F (37.6 °C) Labs:  
  
 
Recent Labs  
  21 
0349 21 
1514 HCT 35.7* 40.6 HGB 12.1 13.5 INR  --  1.0 PT/OT:  
 
  
  
  
ASSESSMENT / PLAN:  
Principal Problem: 
  Closed left hip fracture ([address] (3/25/2021) Active Problems: 
  Hip fracture ([address] (3/25/2021) OR this afternoon for percutaneous pinning Keep NPO Signed By: Amina Carlisle PA-C

## 2021-03-26 NOTE — PERIOP NOTES
Handoff Report from Operating Room to PACU Report received from [address] [place] [address] and [facility] regarding Dejuan Pierce. Surgeon(s): 
Radu VALENCIA DO  And Procedure(s) (LRB): LEFT HIP PERCUTANEOUS CANNULATED SCREWS (Left)  confirmed  
with allergies and dressings discussed. Anesthesia type, drugs, patient history, complications, estimated blood loss, vital signs, intake and output, and last pain medication and reversal medications were reviewed.

## 2021-03-26 NOTE — PROGRESS NOTES
Hospitalist Progress Note 
 
NAME: Xiang Parker  
:  1940  
MRN:  [id number]  
 
 
Assessment / Plan: 
 
Acute left hip fracture, POA 
Mechanical fall, POA 
–X-ray revealed acute minimally displaced left hip fracture 
–Orthopedic surgery following, but this appreciated.  Plans for ORIF today 
–Analgesia and DVT prophylaxis as per Ortho recommendations 
–PT OT evaluation after surgery 
 
Parkinson disease, POA 
–Continue home Sinemet 
 
Coronary artery disease, POA 
History of CVA, POA 
Hypertension, POA 
Hyperlipidemia, POA 
–Continue home aspirin, statin, amlodipine, metoprolol as tolerated 
 
CKD stage III, POA 
–Creatinine baseline 
 
Code status: Full 
Prophylaxis: As per Ortho recommendations 
Recommended Disposition: TBD  
 
Subjective:  
 
Chief Complaint / Reason for Physician Visit 
Discussed with RN events overnight.  
 
Patient was seen and examined. 
No acute events overnight. 
 
\"Doing very well\" 
 
Review of Systems: 
Symptom Y/N Comments  Symptom Y/N Comments  
Fever/Chills n   Chest Pain n   
Poor Appetite    Edema    
Cough n   Abdominal Pain n   
Sputum    Joint Pain    
SOB/MARTINEZ n   Pruritis/Rash    
Nausea/vomit n   Tolerating PT/OT    
Diarrhea    Tolerating Diet y   
Constipation    Other    
 
Could NOT obtain due to:   
 
 
 
Objective:  
 
VITALS:  
Last 24hrs VS reviewed since prior progress note. Most recent are: 
Patient Vitals for the past 24 hrs: 
 Temp Pulse Resp BP SpO2  
21 0802 98.4 °F (36.9 °C) 77 17 (!) 158/81 97 %  
21 0400 98.2 °F (36.8 °C) 86 16 (!) 173/94 98 %  
21 2255 97.4 °F (36.3 °C) 86 16 100/72 100 %  
21 2019 98.4 °F (36.9 °C) 80 16 (!) 164/91 96 %  
21 1805 99.7 °F (37.6 °C) 99 16 (!) 165/95 99 %  
21 1724 98.5 °F (36.9 °C) 85 — (!) 152/81 93 %  
21 1654 — — — (!) 150/82 —  
21 1624 — — — (!) 148/74 99 %  
21 1554 — — — (!) 143/81 98 %  
21 1524 — — — (!) 145/ 100 %  
21 1454 — — — (!)  155/80 100 % 03/25/21 1424    (!) 168/81 96 % 03/25/21 1339 98.4 °F (36.9 °C) (!) 113 17 (!) 164/122 100 % Intake/Output Summary (Last 24 hours) at 3/26/2021 1045 Last data filed at 3/26/2021 1015 Gross per 24 hour Intake 1300 ml Output  Net 1300 ml I had a face to face encounter and independently examined this patient on 3/26/2021, as outlined below: PHYSICAL EXAM: 
General: WD, WN. Alert, cooperative, no acute distress EENT:  EOMI. Anicteric sclerae. MMM Resp:  CTA bilaterally, no wheezing or rales. No accessory muscle use CV:  Regular  rhythm,  No edema GI:  Soft, Non distended, Non tender. +Bowel sounds Neurologic:  Alert and oriented X 3, normal speech, Psych:   Good insight. Not anxious nor agitated Skin:  No rashes. No jaundice Reviewed most current lab test results and cultures  YES Reviewed most current radiology test results   YES Review and summation of old records today    NO Reviewed patient's current orders and MAR    YES 
PMH/SH reviewed - no change compared to H&P 
________________________________________________________________________ Care Plan discussed with: 
  Comments Patient x Family RN x Care Manager Consultant Multidiciplinary team rounds were held today with , nursing, pharmacist and clinical coordinator. Patient's plan of care was discussed; medications were reviewed and discharge planning was addressed. ________________________________________________________________________ Total NON critical care TIME: 36   Minutes Total CRITICAL CARE TIME Spent:   Minutes non procedure based Comments >50% of visit spent in counseling and coordination of care    
________________________________________________________________________ Sandy Sandifer, MD  
 
Procedures: see electronic medical records for all procedures/Xrays and details which were not copied into this note but were reviewed prior to creation of Plan. LABS: 
I reviewed today's most current labs and imaging studies. Pertinent labs include: 
Recent Labs  
  03/26/21 
0349 03/25/21 
1514 WBC 7.3 10.0 HGB 12.1 13.5 HCT 35.7* 40.6  190 Recent Labs  
  03/26/21 
0349 03/25/21 
1514  136  
K 4.1 4.2 * 106 CO2 24 27 * 115* BUN 21* 21* CREA 1.69* 1.89* CA 8.6 9.9 MG 2.5*  --   
ALB 3.3* 4.1 TBILI 0.8 0.9 ALT 8* 34 INR  --  1.0 Signed: Roxi Santiago MD

## 2021-03-26 NOTE — CONSULTS
Date of Consult: 3/25/2021 CC: Left hip pain HPI:      This is a 79 y.o. male status post fall from standing. The patient had the immediate onset of pain as well as inability to ambulate. Complains of left hip and groin pain. Does not complain of any open wounds or head trauma. No loss of consciousness. No other musculoskeletal complaints. The patient denies any other history of fevers, chills, nausea, vomiting, no other numbness, weakness, or tingling. PMH: 
Past Medical History:  
Diagnosis Date  CAD (coronary artery disease)  Hypertension  Other ill-defined conditions(799.89)   
 parkinson's  Other ill-defined conditions(799.89)   
 high cholesterol  Stroke [facility]) 2006  
 left sided weakness PSxHx: 
Past Surgical History:  
Procedure Laterality Date  HX CATARACT REMOVAL Bilateral   
 HX HEART CATHETERIZATION  2006  VT COLONOSCOPY FLX DX W/COLLJ SPEC WHEN PFRMD  5/12/2014 Meds: 
 
Current Facility-Administered Medications:  
  0.9% sodium chloride infusion, 75 mL/hr, IntraVENous, CONTINUOUS, Dewayne Cruz PA-C, Last Rate: 75 mL/hr at 03/25/21 1719, 75 mL/hr at 03/25/21 1719   acetaminophen (TYLENOL) tablet 650 mg, 650 mg, Oral, Q6H, Dewayne Cruz PA-C, 650 mg at 03/25/21 1722   oxyCODONE IR (ROXICODONE) tablet 2.5 mg, 2.5 mg, Oral, Q4H PRN, Dewayne Cruz PA-C 
  oxyCODONE IR (ROXICODONE) tablet 5 mg, 5 mg, Oral, Q4H PRN, Dewayne Cruz PA-C 
  naloxone [facility]) injection 0.4 mg, 0.4 mg, IntraVENous, PRN, Dewayne Cruz PA-C 
  senna-docusate (PERICOLACE) 8.6-50 mg per tablet 2 Tab, 2 Tab, Oral, BID, Dewayne Cruz PA-C, Stopped at 03/25/21 1800 
  ceFAZolin (ANCEF) 2 g in sterile water (preservative free) 20 mL IV syringe, 2 g, IntraVENous, ON CALL TO OR, Anthony, Dewayne, PA-C 
  HYDROmorphone (DILAUDID) injection 0.5-1 mg, 0.5-1 mg, IntraVENous, Q2H PRN, Dewayne Cruz PA-C 
  [START ON 3/26/2021] amLODIPine (NORVASC) tablet 10 mg, 10 mg, Oral, DAILY, Laura Mcconnell MD 
[employer]  [START ON 3/26/2021] aspirin chewable tablet 81 mg, 81 mg, Oral, DAILY, Laura Mcconnell MD 
[employer]  [START ON 3/26/2021] calcium-vitamin D (OS-EULALIO +D3) 500 mg-200 unit per tablet 1 Tab, 1 Tab, Oral, DAILY WITH BREAKFAST, Laura Mcconnell MD 
  carbidopa-levodopa (SINEMET)  mg per tablet 1 Tab, 1 Tab, Oral, TID, Laura Mcconnell MD 
  citalopram (CELEXA) tablet 10 mg, 10 mg, Oral, QPM, Laura Mcconnell MD 
[employer]  [START ON 3/26/2021] metoprolol succinate (TOPROL-XL) XL tablet 25 mg, 25 mg, Oral, DAILY, Laura Mcconnell MD 
[employer]  [START ON 3/26/2021] pravastatin (PRAVACHOL) tablet 80 mg, 80 mg, Oral, DAILY, aLura Mcconnell MD 
[employer]  [START ON 3/26/2021] famotidine (PEPCID) tablet 20 mg, 20 mg, Oral, DAILY, Laura Mcconnell MD 
  polyethylene glycol (MIRALAX) packet 17 g, 17 g, Oral, DAILY PRN, Laura Mcconnell MD 
  sodium chloride (NS) flush 5-40 mL, 5-40 mL, IntraVENous, Q8H, Laura Mcconnell MD 
  sodium chloride (NS) flush 5-40 mL, 5-40 mL, IntraVENous, PRN, Laura Mcconnell MD 
  acetaminophen (TYLENOL) tablet 650 mg, 650 mg, Oral, Q6H PRN **OR** acetaminophen (TYLENOL) suppository 650 mg, 650 mg, Rectal, Q6H PRN, Laura Mcconnell MD 
  polyethylene glycol (MIRALAX) packet 17 g, 17 g, Oral, DAILY PRN, Laura Mcconnell MD 
  promethazine (PHENERGAN) tablet 12.5 mg, 12.5 mg, Oral, Q6H PRN **OR** ondansetron (ZOFRAN) injection 4 mg, 4 mg, IntraVENous, Q6H PRN, Laura Mcconnell MD 
 
All: 
No Known Allergies Social Hx: 
Social History Socioeconomic History  Marital status: SINGLE Spouse name: Not on file  Number of children: Not on file  Years of education: Not on file  Highest education level: Not on file Tobacco Use  Smoking status: Former Smoker  Smokeless tobacco: Never Used Substance and Sexual Activity  Alcohol use: No  
 Drug use: Never Family Hx: 
No family history on file. Review of Systems:  
 
 
General: Denies headache, lethargy, fever, weight loss Ears/Nose/Throat: Denies ear discharge, drainage, nosebleeds, hoarse voice, dental problems Cardiovascular: Denies chest pain, shortness of breath Lungs: Denies chest pain, breathing problems, wheezing, pneumonia Stomach: Denies stomach pain, heartburn, constipation, irritable bowel Skin: Denies rash, sores, open wounds Musculoskeletal: left hip and groin pain Genitourinary: Denies dysuria, hematuria, polyuria Gastrointestinal: Denies constipation, obstipation, diarrhea Neurological: Denies changes in sight, smell, hearing, taste, seizures. Denies loss of consciousness. Psychiatric: Denies depression, sleep pattern changes, anxiety, change in personality Endocrine: Denies mood swings, heat or cold intolerance Hematologic/Lymphatic: Denies anemia, purpura, petechia Allergic/Immunologic: Denies swelling of throat, pain or swelling at lymph nodes Physical Examination: 
 
Visit Vitals BP (!) 164/91 Pulse 80 Temp 98.4 °F (36.9 °C) Resp 16 SpO2 96% General: AOX3, no apparent distress Psychiatric: mood and affect appropriate Lungs: breathing is symmetric and unlabored bilaterally Heart: regular rate and rhythm Abdomen: no guarding Head: normocephalic, atraumatic Skin: No significant abnormalities, good turgor Sensation intact to light touch: C5-T1 dermatomes Muscular exam: 5/5 strength in all major muscle groups unless noted in specialty exam. 
 
Extremities Right upper extremity: Full active and passive range of motion without pain, deformity, no open wound, strength 5/5 in all major muscle groups. Left upper extremity:  Full active and passive range of motion without pain, deformity, no open wound, strength 5/5 in all major muscle groups.  
 
Right lower extremity: Full active and passive range of motion without pain, deformity, no open wound, strength 5/5 in all major muscle groups. Left lower extremity:  Focused exam of the left hip demonstrates pain with circumduction of the hip which refers to the groin. This does reproduce the symptoms of the chief complaint. Leg is appropriate length. Sensation is intact to light touch in the L1-S1 distributions bilaterally. Strength is 5 out of 5 strength at tibialis anterior, EHL and, FHL. Capillary refill is less than 2 seconds in the toes. Head is normocephalic and atraumatic, heart has a regular rate and rhythm, and breathing is symmetric and unlabored bilaterally. Imaging: 
X-rays are available of the pelvis and left hip which demonstrate a nondisplaced femoral neck fracture. Diffuse osteopenia is noted. No other osseous abnormalities are noted. Assessment: 
 
Left femoral neck fracture, nondisplaced. Plan: 
 
We did discuss the treatment options for this left hip fracture, we did discuss that for early mobilization purposes, general hygiene, comfort, early ambulation, that surgical intervention in the form of fracture stabilization versus arthroplasty is indicated. We did discuss the risks of surgery which include but are not limited to infection, nerve or blood vessel damage, femoral, sciatic, and lateral femoral cutaneous nerve injuries, need for reoperation, postoperative pain and swelling, intra-or postoperative fracture, postoperative dislocation, leg length inequality, need for reoperation, implant failure, death, disability, organ dysfunction, wound healing issues, DVT, PE, and the need for further procedures. The patient did freely state their understanding and satisfaction with our discussion. We will proceed after medical clearances. Plan for In situ pinning left hip fracture

## 2021-03-26 NOTE — PERIOP NOTES
TRANSFER - OUT REPORT: 
 
Verbal report given to Asha SANTIAGO  on [employer]  being transferred to 3212(unit) for routine post - op Report consisted of patients Situation, Background, Assessment and  
Recommendations(SBAR). Information from the following report(s) SBAR, OR Summary, Procedure Summary, Intake/Output, MAR and Cardiac Rhythm NSR PVC was reviewed with the receiving nurse. Opportunity for questions and clarification was provided. Patient transported with: 
 Registered Nurse Tech

## 2021-03-26 NOTE — PROGRESS NOTES
End of Shift Note Bedside shift change report given to Asha SANTIAGO  (oncoming nurse) by Jo Oviedo, JACK (offgoing nurse). Report included the following information Kardex, ED Summary, Procedure Summary, Intake/Output, MAR and Recent Results Shift worked:  [address] [facility] Shift summary and any significant changes: NPO since VERITO Duran in  
 
  
Concerns for physician to address:    
  
Zone phone for oncoming shift:    
  
 
Activity: 
Activity Level: Logroll, Bed Rest 
Number times ambulated in hallways past shift: 0 Number of times OOB to chair past shift: 0 Cardiac:  
Cardiac Monitoring: No     
  
 
Access:  
Current line(s): PIV Genitourinary:  
Urinary status: duran Respiratory:  
O2 Device: Room air Chronic home O2 use?: NO Incentive spirometer at bedside: NO 
  
GI: 
Last Bowel Movement Date: 03/25/21 Current diet:  DIET NPO Passing flatus: YES Tolerating current diet: YES Pain Management:  
Patient states pain is manageable on current regimen: YES Skin: 
Florentin Score: 18 Interventions: limit briefs, internal/external urinary devices and nutritional support Patient Safety: 
Fall Score: Total Score: 4 Interventions: bed/chair alarm High Fall Risk: Yes Length of Stay: 
Expected LOS: - - - Actual LOS: 1 Jo Oviedo, RN

## 2021-03-26 NOTE — OP NOTES
Date of Procedure: 3/26/2021 PREOPERATIVE DIAGNOSIS: Left intertrochanteric hip fracture. POSTOPERATIVE DIAGNOSIS: Left intertrochanteric hip fracture. OPERATION: Left short cephalomedullary nail. ASSISTANT SURGEON: none ANESTHESIA: general  
COMPLICATIONS: None. SPECIMENS: None. DRAINS: None. ESTIMATED BLOOD LOSS: 100mL. IMPLANTS: Synthes short TFN- A  170mm x 10mm with a 100mm lag screw and 40mm distal locking screw 125 degree neck angle OPERATIVE INDICATIONS: This is a 79 y.o. male who did sustain an intertrochanteric hip fracture on the left side. We did discuss nonoperative and operative management. For mobilization purposes, pain control, the ability to ambulate and prevent the sequelae of non-weightbearing restrictions for 6-12 weeks, the patient did request surgical intervention. We did discuss the risks of surgery which include but are not limited to infection, nerve or blood vessel damage, need for re-operation, death, disability, DVT, PE, postoperative pain, swelling and stiffness, hardware failure, hardware cut-out, need for subsequent procedures, organ dysfunction of all kinds and wound healing complications. The patient did freely consent for surgery. DESCRIPTION OF PROCEDURE IN DETAIL: After the correct site and side were identified by myself in the holding area, the patient was transported to the surgical suite where after successful induction of anesthesia, was placed on a fracture table where all bony prominences were padded. The contralateral leg was placed in a well leg alcazar. The left leg was then placed in a traction boot. After an appropriate timeout and confirmation that antibiotics had been infused prior to any incision, x-rays were taken and a manual reduction was performed with the use of internal rotation as well as adduction of the intertrochanteric hip fracture.  Once I was satisfied with the positioning as well as the reduction, the left leg was then prepped and draped in the usual sterile orthopedic fashion. After a repeat timeout, an incision was made just proximal to the greater trochanter in line with the femur. The deep fascia was then sharply incised. This did allow for palpation of the greater trochanteric tip. I then used a guide pin for the trochanteric set to gain entry into the femoral canal. I did use orthogonal views to confirm good positioning of the start point. Once I was satisfied with the positioning of the pin, I did over-ream with the reamer and a soft tissue protector. With that, a nail was chosen and this was impacted into place with manual impaction only. There was no use of a reamer or mallet to insert the nail. Once I was satisfied with the depth of the nail, I then used the jig to make an incision at the level of the lag screw. I did incise the skin and fascia sharply in a longitudinal incision. The jig was then taken down to bone. A guide pin was then inserted into the femoral head through the nail in excellent tip apex distance. This was satisfactory on orthogonal views. The tip apex distance was well below 25 mm. With that, we measured and then drilled and tapped the femoral neck and head. I then placed the lag screw under direct fluoroscopic visualization. This did again have excellent tip apex distance with no penetration of the joint on orthogonal views. The locking mechanism was engaged at the proximal aspect of the nail. The compression device was used to compress down the intertrochanteric fracture line. The distal locking screw was then placed off of the jig, using a nick and spread method to gain entry into the thigh, and care was taken not to plunge medially with the drill. The depth was then measured and screw placed. Once I was satisfied with the positioning of the component as well as the fixation, final x-rays were taken. I therefore copiously irrigated all wounds with normal saline.  The deep fascia was closed with #1 Vicryl sutures. The skin was closed with 2-0 Vicryl and 3-0 Monocryl. Dermabond and Steri-Strips were applied. The patient was then awoken and taken to PACU in stable condition with no obvious intraoperative complications. POSTOPERATIVE PLAN: The patient will be weightbearing as tolerated. Anticoagulation will be per the primary team. There will be no hip restrictions. Dressings are to be changed PRN only. He will be seen in 2 weeks with xrays of the hip, then 6 weeks for standard fracture follow up.

## 2021-03-26 NOTE — ANESTHESIA PREPROCEDURE EVALUATION
Anesthetic History No history of anesthetic complications Review of Systems / Medical History Patient summary reviewed, nursing notes reviewed and pertinent labs reviewed Pulmonary Within defined limits Neuro/Psych  
 
 
CVA (left side weakness) Neuromuscular disease (Parkinson's) and TIA Cardiovascular Hypertension: well controlled CAD and hyperlipidemia Exercise tolerance: <4 METS Comments: 12-30-17 EKG: ST, LAE, T wave abnormality Denies chest pain or hrt probs GI/Hepatic/Renal 
  
 
 
Renal disease: CRI Comments: Difficulty swallowing Endo/Other Within defined limits Other Findings Physical Exam 
 
Airway Mallampati: II 
TM Distance: 4 - 6 cm Neck ROM: normal range of motion Mouth opening: Normal 
 
 Cardiovascular Regular rate and rhythm,  S1 and S2 normal,  no murmur, click, rub, or gallop Rhythm: regular Rate: normal 
 
 
 
 Dental 
 
Dentition: Upper partial plate Comments: Lower missing teeth Pulmonary Breath sounds clear to auscultation Abdominal 
GI exam deferred Other Findings Anesthetic Plan ASA: 3 Anesthesia type: general 
 
 
 
 
Induction: Intravenous Anesthetic plan and risks discussed with: Patient

## 2021-03-26 NOTE — ANESTHESIA POSTPROCEDURE EVALUATION
Procedure(s): LEFT HIP PERCUTANEOUS CANNULATED SCREWS. general 
 
Anesthesia Post Evaluation Patient location during evaluation: PACU Note status: Adequate. Level of consciousness: responsive to verbal stimuli and sleepy but conscious Pain management: satisfactory to patient Airway patency: patent Anesthetic complications: no 
Cardiovascular status: acceptable Respiratory status: acceptable Hydration status: acceptable Comments: +Post-Anesthesia Evaluation and Assessment Patient: Pau Santiago MRN: [id number]  SSN: [id number] YOB: 1940  Age: 79 y.o. Sex: male Cardiovascular Function/Vital Signs BP (!) 153/96   Pulse 86   Temp 36.9 °C (98.4 °F)   Resp 14   SpO2 100% Patient is status post Procedure(s): LEFT HIP PERCUTANEOUS CANNULATED SCREWS. Nausea/Vomiting: Controlled. Postoperative hydration reviewed and adequate. Pain: 
Pain Scale 1: Numeric (0 - 10) (03/26/21 1521) Pain Intensity 1: 0 (03/26/21 1521) Managed. Neurological Status:  
Neuro (WDL): Exceptions to WDL (03/26/21 1328) At baseline. Mental Status and Level of Consciousness: Arousable. Pulmonary Status:  
O2 Device: Room air (03/26/21 1515) Adequate oxygenation and airway patent. Complications related to anesthesia: None Post-anesthesia assessment completed. No concerns. Signed By: Solomon Mejia MD  
 3/26/2021 Post anesthesia nausea and vomiting:  controlled INITIAL Post-op Vital signs:  
Vitals Value Taken Time /96 03/26/21 1530 Temp 36.9 °C (98.4 °F) 03/26/21 1534 Pulse 86 03/26/21 1535 Resp 15 03/26/21 1535 SpO2 97 % 03/26/21 1535 Vitals shown include unvalidated device data.

## 2021-03-26 NOTE — PROGRESS NOTES
Problem: Falls - Risk of 
Goal: *Absence of Falls Description: Document Yanet Lobe Fall Risk and appropriate interventions in the flowsheet. Outcome: Progressing Towards Goal 
Note: Fall Risk Interventions: 
Mobility Interventions: Assess mobility with egress test, Communicate number of staff needed for ambulation/transfer, OT consult for ADLs, Patient to call before getting OOB, PT Consult for mobility concerns, PT Consult for assist device competence, Strengthening exercises (ROM-active/passive), Utilize walker, cane, or other assistive device, Utilize gait belt for transfers/ambulation, Bed/chair exit alarm Medication Interventions: Assess postural VS orthostatic hypotension, Evaluate medications/consider consulting pharmacy, Patient to call before getting OOB, Teach patient to arise slowly, Utilize gait belt for transfers/ambulation, Bed/chair exit alarm Elimination Interventions: Call light in reach, Patient to call for help with toileting needs, Elevated toilet seat, Stay With Me (per policy), Toilet paper/wipes in reach, Toileting schedule/hourly rounds, Bed/chair exit alarm, Urinal in reach History of Falls Interventions: Bed/chair exit alarm, Consult care management for discharge planning, Door open when patient unattended, Evaluate medications/consider consulting pharmacy, Room close to nurse's station, Utilize gait belt for transfer/ambulation, Assess for delayed presentation/identification of injury for 48 hrs (comment for end date), Vital signs minimum Q4HRs X 24 hrs (comment for end date) Problem: Patient Education: Go to Patient Education Activity Goal: Patient/Family Education Outcome: Progressing Towards Goal 
  
Problem: Pressure Injury - Risk of 
Goal: *Prevention of pressure injury Description: Document Florentin Scale and appropriate interventions in the flowsheet. Outcome: Progressing Towards Goal 
Note: Pressure Injury Interventions:  
  
 
  
 
Activity Interventions: Assess need for specialty bed, Chair cushion, Increase time out of bed, PT/OT evaluation, Pressure redistribution bed/mattress(bed type) Mobility Interventions: Assess need for specialty bed, Chair cushion, HOB 30 degrees or less, Pressure redistribution bed/mattress (bed type), PT/OT evaluation, Float heels, Turn and reposition approx. every two hours(pillow and wedges) Nutrition Interventions: Document food/fluid/supplement intake, Discuss nutritional consult with provider, Offer support with meals,snacks and hydration Problem: Patient Education: Go to Patient Education Activity Goal: Patient/Family Education Outcome: Progressing Towards Goal

## 2021-03-26 NOTE — PROGRESS NOTES
End of Shift Note Bedside shift change report given to [facility] (oncoming nurse) by Judith Herron RN (offgoing nurse). Report included the following information SBAR, Kardex, Intake/Output, MAR and Recent Results Shift worked:  [id number] Shift summary and any significant changes:  
  surgery today Concerns for physician to address:   
  
Zone phone for oncoming shift:   [phone] Activity: 
Activity Level: Bed Rest 
Number times ambulated in hallways past shift: 0 Number of times OOB to chair past shift: 0 Cardiac:  
Cardiac Monitoring: No     
  
 
Access:  
Current line(s): PIV Genitourinary:  
Urinary status: duran Respiratory:  
O2 Device: Room air Chronic home O2 use?: NO Incentive spirometer at bedside: YES 
  
GI: 
Last Bowel Movement Date: 03/25/21 Current diet:  DIET NPO Passing flatus: YES Tolerating current diet: YES Pain Management:  
Patient states pain is manageable on current regimen: YES Skin: 
Florentin Score: 23 Interventions: float heels, increase time out of bed, PT/OT consult, limit briefs and internal/external urinary devices Patient Safety: 
Fall Score: Total Score: 4 Interventions: bed/chair alarm, assistive device (walker, cane, etc), gripper socks, pt to call before getting OOB and stay with me (per policy) High Fall Risk: Yes Length of Stay: 
Expected LOS: 2d 21h Actual LOS: 1 Judith Herron, RN

## 2021-03-26 NOTE — PROGRESS NOTES
OK to proceed with scheduled surgical procedure tomorrow, assuming no acute changes in clinical status or lab derangements overnight and patient remains NPO overnight. Hospitalist note, labs reviewed.

## 2021-03-26 NOTE — PERIOP NOTES
TRANSFER - IN REPORT: 
 
[facility]  being received from [phone] for ordered procedure Report consisted of patients Situation, Background, Assessment and  
Recommendations(SBAR). Information from the following report(s) SBAR, Kardex, Intake/Output, MAR and Recent Results was reviewed with the receiving nurse. Opportunity for questions and clarification was provided. Assessment completed upon patients arrival to unit and care assumed.

## 2021-03-27 NOTE — PROGRESS NOTES
Problem: Falls - Risk of 
Goal: *Absence of Falls Description: Document Earma Lluvia Fall Risk and appropriate interventions in the flowsheet. Outcome: Progressing Towards Goal 
Note: Fall Risk Interventions: 
Mobility Interventions: Assess mobility with egress test, Communicate number of staff needed for ambulation/transfer, OT consult for ADLs, Patient to call before getting OOB, PT Consult for mobility concerns, PT Consult for assist device competence, Strengthening exercises (ROM-active/passive), Utilize walker, cane, or other assistive device, Utilize gait belt for transfers/ambulation, Bed/chair exit alarm Medication Interventions: Assess postural VS orthostatic hypotension, Bed/chair exit alarm, Evaluate medications/consider consulting pharmacy, Patient to call before getting OOB, Teach patient to arise slowly, Utilize gait belt for transfers/ambulation Elimination Interventions: Bed/chair exit alarm, Call light in reach, Elevated toilet seat, Stay With Me (per policy), Toileting schedule/hourly rounds, Toilet paper/wipes in reach, Patient to call for help with toileting needs, Urinal in reach History of Falls Interventions: Bed/chair exit alarm, Consult care management for discharge planning, Door open when patient unattended, Evaluate medications/consider consulting pharmacy, Room close to nurse's station, Utilize gait belt for transfer/ambulation, Assess for delayed presentation/identification of injury for 48 hrs (comment for end date), Vital signs minimum Q4HRs X 24 hrs (comment for end date) Problem: Patient Education: Go to Patient Education Activity Goal: Patient/Family Education Outcome: Progressing Towards Goal 
  
Problem: Pressure Injury - Risk of 
Goal: *Prevention of pressure injury Description: Document Florentin Scale and appropriate interventions in the flowsheet.  
Outcome: Progressing Towards Goal 
Note: Pressure Injury Interventions: 
Sensory Interventions: Assess changes in LOC, Assess need for specialty bed, Chair cushion, Check visual cues for pain, Discuss PT/OT consult with provider, Avoid rigorous massage over bony prominences, Float heels, Keep linens dry and wrinkle-free, Maintain/enhance activity level, Minimize linen layers, Monitor skin under medical devices, Pad between skin to skin Moisture Interventions: Absorbent underpads, Apply protective barrier, creams and emollients, Check for incontinence Q2 hours and as needed, Contain wound drainage, Assess need for specialty bed, Limit adult briefs, Minimize layers, Moisture barrier, Offer toileting Q_hr, Maintain skin hydration (lotion/cream) Activity Interventions: Assess need for specialty bed, Chair cushion, Pressure redistribution bed/mattress(bed type), PT/OT evaluation, Increase time out of bed Mobility Interventions: Assess need for specialty bed, Chair cushion, Float heels, Pressure redistribution bed/mattress (bed type), PT/OT evaluation, HOB 30 degrees or less, Turn and reposition approx. every two hours(pillow and wedges) Nutrition Interventions: Document food/fluid/supplement intake, Discuss nutritional consult with provider, Offer support with meals,snacks and hydration Friction and Shear Interventions: Apply protective barrier, creams and emollients, Feet elevated on foot rest, Foam dressings/transparent film/skin sealants, HOB 30 degrees or less, Lift sheet, Lift team/patient mobility team, Minimize layers Problem: Patient Education: Go to Patient Education Activity Goal: Patient/Family Education Outcome: Progressing Towards Goal

## 2021-03-27 NOTE — PROGRESS NOTES
ORTHO - Progress Note Post Op day: 1 Day Post-Op Gary Coronel     [id number]  male    79 y.o.    1940 Admit date:3/25/2021 Date of Surgery:3/26/2021 Procedures:Procedure(s):LEFT HIP [facility] NAIL Surgeon:Surgeon(s) and Role:   * Jamie Loya, DO - Primary SUBJECTIVE: 
  
Gary Coronel is a 79 y.o. male resting in the chair. Patient has complaints of minimal to NO c/o post-op pain. Denies F/C, nausea, vomiting, dizziness, lightheadedness, chest pain, or shortness of breath. OBJECTIVE: 
 
  
Physical Exam: 
General: alert, cooperative, no distress. Gastrointestinal:  non-distended . Cardiovascular: equal pulses in the lower extremities,  Brisk cap refill in all distal extremities Genitourinary: Voiding independently Respiratory: No respiratory distress Neurological:Neurovascular exam within normal limits. Senstion intact: LE bilat. Motor: + DF/PF/EHL. Musculoskeletal: Tree's sign negative in bilateral lower extremities. Calves soft, supple, non-tender upon palpation or with passive stretch. Dressing/Wound:  Clean, dry and intact. No significant erythema or swelling. Vital Signs:  
  
  
Patient Vitals for the past 8 hrs: 
 BP Temp Pulse Resp SpO2  
21 1122 (!) 150/79 97.5 °F (36.4 °C) (!) 55 18 100 % 21 0816 (!) 159/76 97.6 °F (36.4 °C) 71 18 100 % Temp (24hrs), Av.8 °F (36.6 °C), Min:97.2 °F (36.2 °C), Max:98.6 °F (37 °C) Date 21 0700 - 21 0861 Shift 8804-3800 4938-5156 4808-1229 24 Hour Total  
INTAKE  
I.V.(mL/kg/hr) 3982. 1   3982.1 Shift Total(mL/kg) [id number]. 1(52.9)   [id number]. 1(52.9) OUTPUT Shift Total(mL/kg) Weight (kg) 75.3 75.3 75.3 75.3 Labs:  
  
 
Recent Labs  
  21 
0459 21 
1514 21 
1514 HCT 33.1*   < > 40.6 HGB 11.1*   < > 13.5 INR  --   --  1.0  
 < > = values in this interval not displayed.   
 
PT/OT:  
 
  
  
  
ASSESSMENT / PLAN:  
Principal Problem: 
  Closed left hip fracture ([facility] [address] (3/25/2021) Active Problems: 
  Hip fracture ([facility] [address] (3/25/2021) -  Continue PT/OT WBAT 
-  DVT prophylaxis- SCD w/ Lovenox 30BID per medical service -  DC planning - SNF v/s In-pt rehab/[facility] per PT recommendations Signed By: Brett Kang PA-C

## 2021-03-27 NOTE — PROGRESS NOTES
Received message from patient's nurse Zeinab Rene stating : 
 
Patient had a duran catheter removed prior to shift and has not voided. Last bladder scan was 187 Discussion / orders: 
 
I have requested for patient's nurse to straight cath patient and inform me of total amount obtained. She did so and reported 100 mL of urine. · Normal saline 500 mL bolus x1 · I noted two orders in place for maintenance IV fluid one is at 75 ml/hr and another at 125 ml/hr. Requested clarification and patient's nurse informs me that fluid has been runing at 75 ml/hr. · Increased maintenance IV fluid to 125 ml/hr Please note that this note was dictated using Dragon computer voice recognition software. Quite often unanticipated grammatical, syntax, homophones, and other interpretive errors are inadvertently transcribed by the computer software. Please disregard these errors. Please excuse any errors that have escaped final proofreading.

## 2021-03-27 NOTE — PROGRESS NOTES
Bedside, Verbal and Written shift change report given to Asha (oncoming nurse) by Andie (offgoing nurse). Report included the following information SBAR, Kardex, OR Summary, Procedure Summary, Intake/Output, MAR, Accordion and Recent Results.

## 2021-03-27 NOTE — PROGRESS NOTES
Dr. Anna Mcgee notified of urinary retention continuing and bladder scanner only showing 123 ml of retention. Order given to continue to bladder scan and to straight cath if bladder scanner shows 350 ml or greater of retention. Will continue to monitor.

## 2021-03-27 NOTE — PROGRESS NOTES
Reason for Admission:   Hip Fracture RUR Score:     16% PCP: First and Last name:   Kim Ferrera DO Name of Practice: [address] [place] [address] Are you a current patient: Yes/No: No 
 Approximate date of last visit: Two weeks ago Can you participate in a virtual visit if needed: Do you (patient/family) have any concerns for transition/discharge? None at this time Plan for utilizing home health:   Pt has had [facility] in the past provided by Gerardo Oliva Current Advanced Directive/Advance Care Plan:  Full Code Healthcare Decision Maker:  
Click here to complete [address] including selection of the Healthcare Decision Maker Relationship (ie \"Primary\") Care Management Interventions PCP Verified by CM: Yes(Two weeks ago) Transition of Care Consult (CM Consult): SNF Physical Therapy Consult: Yes Occupational Therapy Consult: Yes Current Support Network: (Lives with friend ) Freedom of Choice List was Provided with Basic Dialogue that Supports the Patient's Individualized Plan of Care/Goals, Treatment Preferences and Shares the Quality Data Associated with the Providers?: Yes  
 
CM met with Pt to review recommendations for D/C: SNF. Pt reported going [facility] in the past as well as [facility]. Pt prefers to D/C to [facility]. Referral pending via [employer]. Pt reporting living with his long term partner who was present at bedside, Tayler Moya (demogrpahic information verified to be correct in demographics). Pt reported living in a single story home with 3 steps to enter. Pt has a cane, Rolator and walker. Pt interested in [facility] [address] Transition of Care Plan:       
 
1) CM will remain available to assist with coordination of care and D/C planning. 2) CM F/U with [facility] who are interested in Pt. Ni Coto LCSW

## 2021-03-27 NOTE — PROGRESS NOTES
End of Shift Note Bedside shift change report given to [address] [place] [address] (oncoming nurse) by Eladia Fleming RN (offgoing nurse). Report included the following information SBAR, Kardex, Intake/Output, MAR and Recent Results Shift worked:  [id number] Shift summary and any significant changes:  
  pt unable to void. Pt bladder scanned and only showed 123 ml. Dr. Jean Claude Love notified of this and order given to straight cath when bladder scanner shows 350 or more ml of urinary retention. Pt bladder scanned again at 1800 and only 194 ml of retention visualized. Will continue to monitor pt. Concerns for physician to address:  urinary retention Zone phone for oncoming shift:   [phone] Activity: 
Activity Level: Up with Assistance Number times ambulated in hallways past shift: 0 Number of times OOB to chair past shift: 0 Cardiac:  
Cardiac Monitoring: No     
Cardiac Rhythm: Normal sinus rhythm, Premature ventricular contraction Access:  
Current line(s): PIV Genitourinary:  
Urinary status: due to void Respiratory:  
O2 Device: Room air Chronic home O2 use?: NO Incentive spirometer at bedside: YES 
  
GI: 
Last Bowel Movement Date: 03/25/21 Current diet:  DIET CARDIAC Regular Passing flatus: YES Tolerating current diet: YES Pain Management:  
Patient states pain is manageable on current regimen: YES Skin: 
Florentin Score: 22 Interventions: float heels, increase time out of bed, PT/OT consult and limit briefs Patient Safety: 
Fall Score: Total Score: 4 Interventions: bed/chair alarm, assistive device (walker, cane, etc), gripper socks, pt to call before getting OOB and stay with me (per policy) High Fall Risk: Yes Length of Stay: 
Expected LOS: 2d 21h Actual LOS: 2 Elaida Fleming RN

## 2021-03-27 NOTE — PROGRESS NOTES
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 Occupational Therapy Goals: 
Initiated 3/27/2021 1. Patient will perform grooming seated with good balance and supervision within 7 days. 2. Patient will perform lower body dressing with minimal assistance within 7 days. 3. Patient will perform toileting with minimal assistance within 7 days. 4. Patient will perform bathing with min assist within 7 days. 5. Patient will transfer from bedside commode or toilet with minimal assistance using the least restrictive device and appropriate durable medical equipment within 7 days. Outcome: Not Met OCCUPATIONAL THERAPY EVALUATION Patient: Benedicto Baig (86 y.o. male) Date: 3/27/2021 Primary Diagnosis: Hip fracture ([facility] [address] Bruno Chu Procedure(s) (LRB): LEFT HIP PERCUTANEOUS CANNULATED SCREWS (Left) 1 Day Post-Op Precautions:  Fall, WBAT 
 
ASSESSMENT Based on the objective data described below, the patient presents with inability to tolerate WB through LLE standing after max/total assist to EOB. Pt was rigid with all mobility attempts and tremulous at times. He was requesting to go the bathroom due to urge to have BM. Moderate assist x2 needed to achieve sit to stand and pt was leaning to the right with trunk rotated to the right. Pt was unable to shift weight onto operative LE to attempt taking a step and was pulling walker up off floor. He was very anxious as he felt like he was falling.   Unable to attempt to get to bedside commode today and needed to return to supine. Max assist to roll for therapist to place bedpan under pt. 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  Prior to fall and hip fracture pt was ambulatory with [facility] and performed ADLS on his own. He has the potential to be more independent with aggressive rehab. Current Level of Function Impacting Discharge (ADLs/self-care): Bed Mobility: 
Rolling: Total assistance;Maximum assistance;Assist x2; Additional time Supine to Sit: Maximum assistance; Total assistance;Assist x2; Additional time Sit to Supine: Maximum assistance; Total assistance;Assist x2; Additional time Scooting: Maximum assistance; Total assistance; Additional time;Assist x2 Transfers: 
Sit to Stand: Moderate assistance; Additional time;Assist x2(rotated to the right, not WBing on LLE) Stand to Sit: Maximum assistance Bed to Chair: (unable to WB through LLE and poor balance) Bathroom Mobility: (unable) Toilet Transfer : (unable) Feeding: Stand-by assistance Oral Facial Hygiene/Grooming: Contact guard assistance(seated EOB) Bathing: Maximum assistance Upper Body Dressing: Minimum assistance Lower Body Dressing: Total assistance Toileting: Total assistance Functional Outcome Measure: The patient scored 20/100 on the barthel outcome measure which is indicative of significant decline in mobility and ADLS. Other factors to consider for discharge: was ambulatory and functional prior to fall Patient will benefit from skilled therapy intervention to address the above noted impairments. PLAN : 
Recommendations and Planned Interventions: self care training, functional mobility training, therapeutic exercise, balance training, therapeutic activities, patient education, home safety training, and family training/education Frequency/Duration: Patient will be followed by occupational therapy 5 times a week to address goals.  
 
Recommendation for discharge: (in order for the patient to meet his/her long term goals) Therapy 3 hours per day 5-7 days per week This discharge recommendation: A follow-up discussion with the attending provider and/or case management is planned IF patient discharges home will need the following DME: bedside commode, wheelchair, and gait belt SUBJECTIVE:  
Patient stated It didn't hurt until I moved. It is 100/10.  OBJECTIVE DATA SUMMARY:  
HISTORY:  
Past Medical History:  
Diagnosis Date CAD (coronary artery disease) High cholesterol Hypertension Parkinson disease ([facility] [address] Stroke [facility]) 2006  
 left sided weakness Past Surgical History:  
Procedure Laterality Date HX CATARACT REMOVAL Bilateral   
 HX HEART CATHETERIZATION  2006 [place] COLONOSCOPY FLX DX W/COLLJ SPEC WHEN PFRMD  5/12/2014 Expanded or extensive additional review of patient history:  
 
Home Situation Home Environment: Private residence # Steps to Enter: 3 Rails to Enter: Yes Hand Rails : Bilateral 
One/Two Story Residence: One story Living Alone: No 
Support Systems: Spouse/Significant Other/Partner Current DME Used/Available at Home: Arvil McDonald, rollator, Walker, rolling, [place], straight, Grab bars, Safety frame [employer] Tub or Shower Type: Shower Hand dominance: Right EXAMINATION OF PERFORMANCE DEFICITS: 
Cognitive/Behavioral Status: 
Neurologic State: Alert Orientation Level: Oriented X4 Cognition: Decreased attention/concentration; Impulsive; Follows commands Perception: Appears intact Perseveration: (cues to terminate mobility, anxious with mob d/t pain) Safety/Judgement: Fall prevention Hearing: Auditory Auditory Impairment: None Hearing Aids/Status: At bedside Vision/Perceptual:   
Tracking: Able to track stimulus in all quadrants w/o difficulty Acuity: Impaired near vision Corrective Lenses: Reading glasses Range of Motion: 
 
AROM: Generally decreased, functional(rigid) PROM: Generally decreased, functional(rigid) Strength: 
 
Strength: Generally decreased, functional(rigid) Coordination: 
Coordination: Generally decreased, functional(left hand due to old CVA, unable to oppose fingers thumb) Fine Motor Skills-Upper: Left Impaired;Right Intact Gross Motor Skills-Upper: Left Impaired;Right Intact Tone & Sensation: 
 
Tone: Abnormal 
Sensation: Intact Balance: 
Sitting: Impaired Sitting - Static: Fair (occasional) Sitting - Dynamic: Fair (occasional) Standing: Impaired Standing - Static: Constant support;Poor Standing - Dynamic : Poor(right lateral and lean/rotated right RW for support) Functional Mobility and Transfers for ADLs: 
Bed Mobility: 
Rolling: Total assistance;Maximum assistance;Assist x2; Additional time Supine to Sit: Maximum assistance; Total assistance;Assist x2; Additional time Sit to Supine: Maximum assistance; Total assistance;Assist x2; Additional time Scooting: Maximum assistance; Total assistance; Additional time;Assist x2 Transfers: 
Sit to Stand: Moderate assistance; Additional time;Assist x2(rotated to the right, not WBing on LLE) Stand to Sit: Maximum assistance Bed to Chair: (unable to WB through LLE and poor balance) Bathroom Mobility: (unable) Toilet Transfer : (unable) ADL Assessment: 
Feeding: Stand-by assistance Oral Facial Hygiene/Grooming: Contact guard assistance(seated EOB) Bathing: Maximum assistance Upper Body Dressing: Minimum assistance Lower Body Dressing: Total assistance Toileting: Total assistance ADL Intervention and task modifications: Total assist BM clean up at bed level. Total assist donning socks Cognitive Retraining Safety/Judgement: Fall prevention Functional Measure: 
Barthel Index: 
 
Bathin Bladder: 10 Bowels: 5 Groomin Dressin Feedin Mobility: 0 Stairs: 0 Toilet Use: 0 Transfer (Bed to Chair and Back): 0 Total: 20/100 The Barthel ADL Index: Guidelines 1. The index should be used as a record of what a patient does, not as a record of what a patient could do. 2. The main aim is to establish degree of independence from any help, physical or verbal, however minor and for whatever reason. 3. The need for supervision renders the patient not independent. 4. A patient's performance should be established using the best available evidence. Asking the patient, friends/relatives and nurses are the usual sources, but direct observation and common sense are also important. However direct testing is not needed. 5. Usually the patient's performance over the preceding 24-48 hours is important, but occasionally longer periods will be relevant. 6. Middle categories imply that the patient supplies over 50 per cent of the effort. 7. Use of aids to be independent is allowed. Karen Ellis., Barthel, D.W. (6125). Functional evaluation: the Barthel Index. [facility] (14)2. KAMILA Conde, Floresita Patel, Chace Henao., [place], [address] [place] [address] Ave (1999). Measuring the change indisability after inpatient rehabilitation; comparison of the responsiveness of the Barthel Index and Functional [place] Measure. Journal of Neurology, Neurosurgery, and Psychiatry, 66(4), 126-937. MICHELE Cummins.HERMINIO, NATALIE White, & Rachel Lujan M.A. (2004.) Assessment of post-stroke quality of life in cost-effectiveness studies: The usefulness of the Barthel Index and the EuroQoL-5D. [facility], 13, [phone] Occupational Therapy Evaluation Charge Determination History Examination Decision-Making MEDIUM Complexity : Expanded review of history including physical, cognitive and psychosocial  history  MEDIUM Complexity : 3-5 performance deficits relating to physical, cognitive , or psychosocial skils that result in activity limitations and / or participation restrictions MEDIUM Complexity : Patient may present with comorbidities that affect occupational performnce. Miniml to moderate modification of tasks or assistance (eg, physical or verbal ) with assesment(s) is necessary to enable patient to complete evaluation Based on the above components, the patient evaluation is determined to be of the following complexity level: MEDIUM Pain Rating: 
Reports 100/10 pain in LLE with attempt at mobility, resolves with rest and was 0/10 prior to activity Activity Tolerance:  
Fair and requires rest breaks After treatment patient left in no apparent distress:   
Supine in bed, Call bell within reach, and Bed / chair alarm activated COMMUNICATION/EDUCATION:  
The patients plan of care was discussed with: Physical therapist, Registered nurse, and patient . Patient/family agree to work toward stated goals and plan of care. This patients plan of care is appropriate for delegation to [facility]. Thank you for this referral. 
Tha Villegas, OTR/L Time Calculation: 30 mins

## 2021-03-27 NOTE — PROGRESS NOTES
Patient had a duran catheter removed prior to shift and has not voided. Last bladder scan was 187. NP notified. Ordered received to straight cath patient 
 
1039 Order received for 500 ml bolus and rate change of continuous NS to 125 ml/hr

## 2021-03-27 NOTE — PROGRESS NOTES
Attempted to see pt for PT services. Pts nursing is in the process of administering pain medication and sinemet. Will defer but continue to follow.

## 2021-03-27 NOTE — PROGRESS NOTES
Attempted to see pt for OT services. Pts nursing is in the process of administering pain medication and sinemet. Will defer but continue to follow.

## 2021-03-27 NOTE — PROGRESS NOTES
Hospitalist Progress Note NAME: Gerber Welch :  1940 MRN:  [id number] Assessment / Plan: 
 
Acute left hip fracture, POA Mechanical fall, POA Vianey Britfiliberto revealed acute minimally displaced left hip fracture Status post Left short cephalomedullary nail  [place] and DVT prophylaxis as per Ortho recommendations PT OT evaluation Parkinson disease, POA Continue home Sinemet Coronary artery disease, POA History of CVA, POA Hypertension, POA Hyperlipidemia, POA Continue home aspirin, statin, amlodipine, metoprolol as tolerated CKD stage III, POA Creatinine baseline. Creatinine is 2.01 today from 1.69 yesterday which is continue to be baseline, he has 2.11 2019 IV fluids was ordered overnight, continue Code status: Full Prophylaxis: As per Ortho recommendations Recommended Disposition: TBD Subjective: Chief Complaint / Reason for Physician Visit Discussed with RN events overnight. Patient was seen and examined. No acute events overnight. \"Feeling fine\" Review of Systems: 
Symptom Y/N Comments  Symptom Y/N Comments Fever/Chills n   Chest Pain n   
Poor Appetite    Edema Cough n   Abdominal Pain n   
Sputum    Joint Pain SOB/MARTINEZ n   Pruritis/Rash Nausea/vomit n   Tolerating PT/OT Diarrhea    Tolerating Diet y Constipation    Other Could NOT obtain due to:   
 
 
 
Objective: VITALS:  
Last 24hrs VS reviewed since prior progress note. Most recent are: 
Patient Vitals for the past 24 hrs: 
 Temp Pulse Resp BP SpO2  
21 0816 97.6 °F (36.4 °C) 71 18 (!) 159/76 100 % 21 0500 97.9 °F (36.6 °C) 68 18 135/76 99 % 21 0015 97.2 °F (36.2 °C) 75 16 (!) 150/72 95 % 21 1942 97.8 °F (36.6 °C) 68 18 (!) 156/88 97 % 21 1902  60  (!) 155/92   
21 1823 97.5 °F (36.4 °C) 79 18 (!) 151/92 100 % 21 1723 97.5 °F (36.4 °C) 78 18 134/81 100 % 21 1623 97.6 °F (36.4 °C) 87 16 (!) 161/77 98 % 03/26/21 1614  87 17  97 % 03/26/21 1600  86 12 (!) 163/81 91 %  
03/26/21 1545  90 14 (!) 172/68 99 % 03/26/21 1534 98.4 °F (36.9 °C) 86 14  100 % 03/26/21 1530  85 16 (!) 153/96 99 % 03/26/21 1519  93 13 (!) 171/93 97 % 03/26/21 1515  94 19 (!) 161/101 100 % 03/26/21 1510  97 12 (!) 165/114 100 % 03/26/21 1509  98 12 (!) 147/98 100 % 03/26/21 1508 98.6 °F (37 °C) 98 12 (!) 147/98 100 % 03/26/21 1318 98.6 °F (37 °C) 90 17 (!) 161/87 100 % 03/26/21 1133 98.3 °F (36.8 °C) 81 17 (!) 163/94 98 % Intake/Output Summary (Last 24 hours) at 3/27/2021 1056 Last data filed at 3/27/2021 8586 Gross per 24 hour Intake 5382.08 ml Output 650 ml Net 4732.08 ml I had a face to face encounter and independently examined this patient on 3/27/2021, as outlined below: PHYSICAL EXAM: 
General: WD, WN. Alert, cooperative, no acute distress EENT:  EOMI. Anicteric sclerae. MMM Resp:  CTA bilaterally, no wheezing or rales. No accessory muscle use CV:  Regular  rhythm,  No edema GI:  Soft, Non distended, Non tender. +Bowel sounds Neurologic:  Alert and oriented X 3, normal speech, Psych:   Good insight. Not anxious nor agitated Skin:  No rashes. No jaundice Reviewed most current lab test results and cultures  YES Reviewed most current radiology test results   YES Review and summation of old records today    NO Reviewed patient's current orders and MAR    YES 
PMH/SH reviewed - no change compared to H&P 
________________________________________________________________________ Care Plan discussed with: 
  Comments Patient x Family RN x Care Manager Consultant Multidiciplinary team rounds were held today with , nursing, pharmacist and clinical coordinator. Patient's plan of care was discussed; medications were reviewed and discharge planning was addressed. ________________________________________________________________________ Total NON critical care TIME: 36   Minutes Total CRITICAL CARE TIME Spent:   Minutes non procedure based Comments >50% of visit spent in counseling and coordination of care    
________________________________________________________________________ Delmy Yu MD  
 
Procedures: see electronic medical records for all procedures/Xrays and details which were not copied into this note but were reviewed prior to creation of Plan. LABS: 
I reviewed today's most current labs and imaging studies. Pertinent labs include: 
Recent Labs  
  03/27/21 0459 03/26/21 0349 03/25/21 
1514 WBC 8.7 7.3 10.0 HGB 11.1* 12.1 13.5 HCT 33.1* 35.7* 40.6  176 190 Recent Labs  
  03/27/21 0459 03/26/21 0349 03/25/21 
1514  140 136  
K 4.9 4.1 4.2  110* 106 CO2 24 24 27 * 107* 115* BUN 22* 21* 21* CREA 2.01* 1.69* 1.89* CA 8.5 8.6 9.9 MG  --  2.5*  --   
ALB  --  3.3* 4.1 TBILI  --  0.8 0.9 ALT  --  8* 34 INR  --   --  1.0 Signed: Delmy Yu MD

## 2021-03-27 NOTE — PROGRESS NOTES
Problem: Mobility Impaired (Adult and Pediatric) Goal: *Acute Goals and Plan of Care (Insert Text) Description: FUNCTIONAL STATUS PRIOR TO ADMISSION: Patient was modified independent using a single point cane for functional mobility. HOME SUPPORT PRIOR TO ADMISSION: The patient lived with his wife that provides transportation and assists with Iadls. Physical Therapy Goals Initiated 3/27/2021 1. Patient will move from supine to sit and sit to supine , scoot up and down, and roll side to side in bed with moderate assistance  within 7 day(s). 2.  Patient will transfer from bed to chair and chair to bed with moderate assistance  using the least restrictive device within 7 day(s). 3.  Patient will perform sit to stand with minimal assistance/contact guard assist within 7 day(s). 4.  Patient will ambulate with minimal assistance/contact guard assist for 50 feet with the least restrictive device within 7 day(s). Outcome: Not Progressing Towards Goal 
 PHYSICAL THERAPY EVALUATION Patient: Gaudencio Diana (74 y.o. male) Date: 3/27/2021 Primary Diagnosis: Hip fracture ([facility] [address] Marbella Courser Procedure(s) (LRB): LEFT HIP PERCUTANEOUS CANNULATED SCREWS (Left) 1 Day Post-Op Precautions:   Fall, WBAT 
 
ASSESSMENT Based on the objective data described below, the patient presents with LLE pain, impaired mobility, poor standing balance, inability to tolerate LLE weight bearing in standing, and decreased activity tolerance. Pt supine in bed, on RA and agreeable to  PT evaluation. Pt required max/total assist x 2 to move from supine to sitting eob. Noted pt with very tight B hamstrings and very rigid with mobility. He required mod a x2 to stand at [facility] and was positioned with R trunk rotation and weight shifted to the right with poor weightbearing tolerance on LLE. Pt unable to successfully shift to the L to take a step and became very unsafe and anxious, reporting he felt like he was falling.   Pt returned to supine and a bed pan was placed under him. He required max a to roll to either side. He will need aggressive rehab in light of his Parkinson's disease and his mod I PLOF. Current Level of Function Impacting Discharge (mobility/balance): Bed Mobility: 
Rolling: Total assistance;Maximum assistance;Assist x2; Additional time Supine to Sit: Maximum assistance; Total assistance;Assist x2; Additional time Sit to Supine: Maximum assistance; Total assistance;Assist x2; Additional time Scooting: Maximum assistance; Total assistance; Additional time;Assist x2 Transfers: 
Sit to Stand: Moderate assistance; Additional time;Assist x2(rotated to the right, not WBing on LLE) Stand to Sit: Maximum assistance Bed to Chair: (unable to WB through LLE and poor balance) Functional Outcome Measure: The patient scored 20/100 on the Barthel Index outcome measure which is indicative of 80% impaired function/adls Other factors to consider for discharge: pain, fall risk, anxious, functioning below his baseline Patient will benefit from skilled therapy intervention to address the above noted impairments. PLAN : 
Recommendations and Planned Interventions: bed mobility training, transfer training, gait training, therapeutic exercises, patient and family training/education, and therapeutic activities Frequency/Duration: Patient will be followed by physical therapy:  daily to address goals. Recommendation for discharge: (in order for the patient to meet his/her long term goals) Therapy 3 hours per day 5-7 days per week This discharge recommendation: A follow-up discussion with the attending provider and/or case management is planned IF patient discharges home will need the following DME: patient owns DME required for discharge SUBJECTIVE:  
Patient stated I don't hurt while I'm here. It didn't hurt when I broke it OBJECTIVE DATA SUMMARY:  
HISTORY:   
Past Medical History: Diagnosis Date CAD (coronary artery disease) High cholesterol Hypertension Parkinson disease ([facility] [address] Stroke [facility]) 2006  
 left sided weakness Past Surgical History:  
Procedure Laterality Date HX CATARACT REMOVAL Bilateral   
 HX HEART CATHETERIZATION  2006 RI COLONOSCOPY FLX DX W/COLLJ SPEC WHEN PFRMD  5/12/2014 Personal factors and/or comorbidities impacting plan of care: baseline Parkinson's disease, pain control Home Situation Home Environment: Private residence # Steps to Enter: 3 Rails to Enter: Yes Hand Rails : Bilateral 
One/Two Story Residence: One story Living Alone: No 
Support Systems: Spouse/Significant Other/Partner Current DME Used/Available at Home: [place], rollator, Walker, rolling, [place], straight, Grab bars, Safety frame [employer] Tub or Shower Type: Shower EXAMINATION/PRESENTATION/DECISION MAKING:  
Critical Behavior: 
Neurologic State: Alert Orientation Level: Oriented X4 Cognition: Decreased attention/concentration, Impulsive, Follows commands Safety/Judgement: Fall prevention Hearing: Auditory Auditory Impairment: None Hearing Aids/Status: At bedside Range Of Motion: 
AROM: Generally decreased, functional(rigid) PROM: Generally decreased, functional(rigid) Strength:   
Strength: Generally decreased, functional(rigid) Tone & Sensation:  
Tone: Abnormal 
  
  
  
  
Sensation: Intact Coordination: 
Coordination: Generally decreased, functional(left hand due to old CVA, unable to oppose fingers thumb) Vision:  
Tracking: Able to track stimulus in all quadrants w/o difficulty Acuity: Impaired near vision Corrective Lenses: Reading glasses Functional Mobility: 
Bed Mobility: 
Rolling: Total assistance;Maximum assistance;Assist x2; Additional time Supine to Sit: Maximum assistance; Total assistance;Assist x2; Additional time Sit to Supine: Maximum assistance; Total assistance;Assist x2;Additional time Scooting: Maximum assistance; Total assistance; Additional time;Assist x2 Transfers: 
Sit to Stand: Moderate assistance; Additional time;Assist x2(rotated to the right, not WBing on LLE) Stand to Sit: Maximum assistance Bed to Chair: (unable to WB through LLE and poor balance) Balance:  
Sitting: Impaired Sitting - Static: Fair (occasional) Sitting - Dynamic: Fair (occasional) Standing: Impaired Standing - Static: Constant support;Poor Standing - Dynamic : Poor(right lateral and lean/rotated right RW for support) Functional Measure: 
Barthel Index: 
 
Bathin Bladder: 10 Bowels: 5 Groomin Dressin Feedin Mobility: 0 Stairs: 0 Toilet Use: 0 Transfer (Bed to Chair and Back): 0 Total: 20/100 The Barthel ADL Index: Guidelines 1. The index should be used as a record of what a patient does, not as a record of what a patient could do. 2. The main aim is to establish degree of independence from any help, physical or verbal, however minor and for whatever reason. 3. The need for supervision renders the patient not independent. 4. A patient's performance should be established using the best available evidence. Asking the patient, friends/relatives and nurses are the usual sources, but direct observation and common sense are also important. However direct testing is not needed. 5. Usually the patient's performance over the preceding 24-48 hours is important, but occasionally longer periods will be relevant. 6. Middle categories imply that the patient supplies over 50 per cent of the effort. 7. Use of aids to be independent is allowed. Jorden Carrillo., Barthel, D.W. ([id number]). Functional evaluation: the Barthel Index. [address] [facility] (14)2. KAMILA Mackay, Mona Amezcua., [place]., Leah Siddiqui, [address] Located within [facility] ().  Measuring the change indisability after inpatient rehabilitation; comparison of the responsiveness of the Barthel Index and Functional [place] Measure. Journal of Neurology, Neurosurgery, and Psychiatry, 66(4), 480-484. 
Van RYAN Castaneda.A, NATALIE Singh, & ENEDELIA Ham. (2004.) Assessment of post-stroke quality of life in cost-effectiveness studies: The usefulness of the Barthel Index and the EuroQoL-5D. Quality of Life Research, 13, 427-43  
  
 
  
Physical Therapy Evaluation Charge Determination  
History Examination Presentation Decision-Making  
MEDIUM  Complexity : 1-2 comorbidities / personal factors will impact the outcome/ POC  MEDIUM Complexity : 3 Standardized tests and measures addressing body structure, function, activity limitation and / or participation in recreation  MEDIUM Complexity : Evolving with changing characteristics  MEDIUM Complexity : FOTO score of 26-74  
  
Based on the above components, the patient evaluation is determined to be of the following complexity level: MEDIUM 
 
Pain Ratin/10 L hip.  0/10 at rest 
 
Activity Tolerance:  
Poor 
 
After treatment patient left in no apparent distress:  
Supine in bed, Call bell within reach, and Side rails x 3 
 
COMMUNICATION/EDUCATION:  
The patient’s plan of care was discussed with: Occupational therapist and Registered nurse.  
 
Fall prevention education was provided and the patient/caregiver indicated understanding., Patient/family have participated as able in goal setting and plan of care., and Patient/family agree to work toward stated goals and plan of care. 
 
Thank you for this referral. 
Arcelia Horta, PT 
 Time Calculation: 28 mins

## 2021-03-28 NOTE — PROGRESS NOTES
Bedside and Verbal shift change report given to Andie SANTIAGO (oncoming nurse) by [address] (offgoing nurse). Report included the following information SBAR, Kardex, Intake/Output, MAR and Recent Results.

## 2021-03-28 NOTE — PROGRESS NOTES
End of Shift Note Bedside shift change report given to [address] (oncoming nurse) by Leah Colón RN (offgoing nurse). Report included the following information SBAR, Kardex, Intake/Output, MAR and Recent Results Shift worked:  1812-4218 Shift summary and any significant changes:  
  pt up to chair today and sat in chair for lunch. Dr. Anthony Mueller notified of increased confusion starting at 0400 today as well as pt visual hallucinations. Bed alarm on. Concerns for physician to address:  new onset of confusion? Zone phone for oncoming shift:   1759 Activity: 
Activity Level: Up with Assistance Number times ambulated in hallways past shift: 0 Number of times OOB to chair past shift: 2 Cardiac:  
Cardiac Monitoring: No     
Cardiac Rhythm: Normal sinus rhythm Access:  
Current line(s): PIV Genitourinary:  
Urinary status: voiding Respiratory:  
O2 Device: Room air Chronic home O2 use?: NO Incentive spirometer at bedside: YES 
  
GI: 
Last Bowel Movement Date: 03/25/21 Current diet:  DIET CARDIAC Regular Passing flatus: YES Tolerating current diet: YES Pain Management:  
Patient states pain is manageable on current regimen: YES Skin: 
Florentin Score: 18 Interventions: float heels, increase time out of bed, foam dressing and PT/OT consult Patient Safety: 
Fall Score: Total Score: 5 Interventions: assistive device (walker, cane, etc), gripper socks, pt to call before getting OOB and stay with me (per policy) High Fall Risk: Yes Length of Stay: 
Expected LOS: 2d 21h Actual LOS: 3 Leah Colón, RN

## 2021-03-28 NOTE — PROGRESS NOTES
Hospitalist Progress Note NAME: Heidy Rodríguez :  1940 MRN:  [id number] Assessment / Plan: 
 
Acute left hip fracture, POA Mechanical fall, POA Dwight Butt revealed acute minimally displaced left hip fracture Status post Left short cephalomedullary nail  Erik Ao and DVT prophylaxis as per Ortho recommendations PT OT evaluation, likely will need placement Parkinson disease, POA Continue home Sinemet Coronary artery disease, POA History of CVA, POA Hypertension, POA Hyperlipidemia, POA Continue home aspirin, statin, amlodipine, metoprolol as tolerated CKD stage III, POA Creatinine baseline. Creatinine is mildly elevated today from  yesterday which is continue to be baseline, he has 2.11 in 2019 IV fluids was ordered overnight, continue Moralesjaren Melendez is able to urinate with no concerns of urinary retention at this point Code status: Full Prophylaxis: As per Ortho recommendations Recommended Disposition: TBD Subjective: Chief Complaint / Reason for Physician Visit Discussed with RN events overnight. Patient was seen and examined. No acute events overnight. \"Doing okay\" Review of Systems: 
Symptom Y/N Comments  Symptom Y/N Comments Fever/Chills n   Chest Pain n   
Poor Appetite    Edema Cough n   Abdominal Pain n   
Sputum    Joint Pain SOB/MARTINEZ n   Pruritis/Rash Nausea/vomit n   Tolerating PT/OT Diarrhea    Tolerating Diet y Constipation    Other Could NOT obtain due to:   
 
 
 
Objective: VITALS:  
Last 24hrs VS reviewed since prior progress note. Most recent are: 
Patient Vitals for the past 24 hrs: 
 Temp Pulse Resp BP SpO2  
21 0720 98 °F (36.7 °C) 72 16 (!) 152/67 94 % 21 0239 97.7 °F (36.5 °C) 77 16 131/69 100 % 21 2325 98.4 °F (36.9 °C) 88 16 (!) 140/73 97 % 21 1908 98 °F (36.7 °C) 78 18 (!) 159/86 97 % 21 1501 97.8 °F (36.6 °C) 65 18 (!) 146/78 100 % Intake/Output Summary (Last 24 hours) at 3/28/2021 1139 Last data filed at 3/28/2021 7262 Gross per 24 hour Intake 2010.42 ml Output 550 ml Net 1460.42 ml I had a face to face encounter and independently examined this patient on 3/28/2021, as outlined below: PHYSICAL EXAM: 
General: WD, WN. Alert, cooperative, no acute distress EENT:  EOMI. Anicteric sclerae. MMM Resp:  CTA bilaterally, no wheezing or rales. No accessory muscle use CV:  Regular  rhythm,  No edema GI:  Soft, Non distended, Non tender. +Bowel sounds Neurologic:  Alert and oriented X 3, normal speech, Psych:   Good insight. Not anxious nor agitated Skin:  No rashes. No jaundice Reviewed most current lab test results and cultures  YES Reviewed most current radiology test results   YES Review and summation of old records today    NO Reviewed patient's current orders and MAR    YES 
PMH/ reviewed - no change compared to H&P 
________________________________________________________________________ Care Plan discussed with: 
  Comments Patient x Family RN x Care Manager Consultant Multidiciplinary team rounds were held today with , nursing, pharmacist and clinical coordinator. Patient's plan of care was discussed; medications were reviewed and discharge planning was addressed. ________________________________________________________________________ Total NON critical care TIME: 36   Minutes Total CRITICAL CARE TIME Spent:   Minutes non procedure based Comments >50% of visit spent in counseling and coordination of care    
________________________________________________________________________ Armando Hobbs MD  
 
Procedures: see electronic medical records for all procedures/Xrays and details which were not copied into this note but were reviewed prior to creation of Plan. LABS: 
I reviewed today's most current labs and imaging studies.  
Pertinent labs include: 
Recent Labs  
  03/28/21 0439 03/27/21 0459 03/26/21 0349 WBC 8.1 8.7 7.3 HGB 9.8* 11.1* 12.1 HCT 29.5* 33.1* 35.7*  
* 166 176 Recent Labs  
  03/28/21 0439 03/27/21 0459 03/26/21 0349 03/25/21 
1514  137 140 136  
K 4.3 4.9 4.1 4.2 * 108 110* 106 CO2 24 24 24 27 GLU 97 128* 107* 115* BUN 28* 22* 21* 21* CREA 2.14* 2.01* 1.69* 1.89* CA 7.8* 8.5 8.6 9.9 MG  --   --  2.5*  --   
ALB  --   --  3.3* 4.1 TBILI  --   --  0.8 0.9 ALT  --   --  8* 34 INR  --   --   --  1.0 Signed: Della Rodrigues MD

## 2021-03-28 NOTE — DISCHARGE INSTRUCTIONS
Discharge Instructions: How to [facility]  Surgery: Hip Fracture Repair  Surgeon:   Jamie Loya DO  Surgery Date:  3/26/2021    [address] [facility] [address] To relieve pain:   Use ice/gel packs.  Put the ice pack directly over the wound, or anywhere you are hurting or swollen.  To control pain and swelling, keep ice on regularly, especially after physical activity.  The packs should stay cold for 3-4 hours. When it is not cold anymore, rotate with the packs in the freezer.  Elevate your leg. This will also keep swelling down.  Rest for at least 20 minutes between activity or exercises.  To keep track of your pain medications, write down what you take and when you take it.  The last dose of pain medication you got in the hospital was:     Medication    Dose    Date & Time           Choose your medications based on the pain scale below:     To keep your pain under control, take Tylenol every 6 hours for 14 days - even if you feel like you dont need it.  For mild to moderate pain (1-6 on pain scale), take one pain pill every 3-4 hours as needed.  For severe pain (7-10 on pain scale), take two pain pills every 3-4 hours as needed.  To prevent nausea, take your pain medications with food. Pain Scale                 As your pain lessens:     Slowly start taking less pain medication. You may do this by waiting longer between doses or by taking smaller doses.  Stop using the pain medications as soon as you no longer need it, usually in 2-3 weeks.  Lovenox   To prevent blood clots, you will will need to take    Lovenox 30 mg twice daily for 21 days.  It is an injection that you receive in the side of your  stomach.  Your nursing will teach you or a caregiver how to     do this before you go home.               To prevent stomach upset or bleeding:   Take Pepcid 20 mg twice a day, or a similar home medication, while you are taking a blood thinner.  Do not take non-steroidal anti-inflammatory (NSAID) medications (Ibuprofen, Advil, Motrin, Naproxen, etc.)                                                 OPSITE (Honeycomb dressing)     Keep your clear, waterproof dressing in place for 5-7 days after your leave the hospital.      If you are still having drainage, you will need to change your dressing in 5-7 days. You will be given one extra dressing to use at home.  If there is no more drainage from the wound, you may leave it open to the air. OPSITE DRESSING INSTRUCTIONS                 DO NOTs:   Do not rub your surgical wound   Do not put lotion or oils on your wound.  Do not take a tub bath or go swimming until your doctor says it is ok.  You may shower with this dressing over your wound.  After showering pat the dressing dry.  If you have staples a home health nurse will remove them in about 10 days.  To increase and promote healing:     Stop Smoking (or at least cut back on       Smoking).  Eat a well-balanced diet (high in protein       and vitamin C).  If you have a poor appetite, drink Ensure, Glucerna, or CarnationInstant Breakfast for the next 30 days.  If you are diabetic, you should control your blood         sugars to prevent infection and help your wound         to heal.       To prevent constipation, stay active & drink plenty of fluid.  While using pain medications, you should also take stool softeners and laxatives, such as Pericolace and Miralax.  If you are having too many bowel movements, then you may need  to stop taking the laxatives.  You should have a bowel movement 3-4 days after surgery and then at least every other day while on pain medication.  To improve your recovery, you must be active!      WEIGHT BEARING   As Tolerated = You can put as much weight on your leg as you can tolerate while walking.  THERAPY   If you go to a rehab facility, physical therapy (PT) will need to work with you daily, and sometimes twice a day to teach you how to:     Get in and out of the bed   Walk (gait training) and climb stairs   Do exercises and gain strength   Use a walker, crutches or cane     You may also need to have occupational therapy (OT) work with you to help you practice:     Getting on and off the toilet   Self-care (brushing teeth, eating, bathing, etc)     If you go directly home, home health physical therapy will come the day after you leave the hospital.  They will visit about 4 times over the next couple of weeks to teach you how to get out of bed, to safely walk in your home, and to do your exercises.  NO DRIVING until your surgeon tells you it is ok.  You can return to work when cleared by a physician.  Please call your physician immediately if you have:     Constant bleeding from your wound.  Increasing redness or swelling around your wound (Some warmth, bruising and swelling is normal).  Change in wound drainage (increase in amount, color, or bad smell).  Change in mental status (unusual behavior)     Temperature over 101.5 degrees Fahrenheit     Increased pain, swelling, or redness in the calf (back of your lower leg), thigh, ankle or foot.  Emergency: CALL 911 if you have:   Shortness of breath   Chest pain when you cough or taking a deep breath     Please call your surgeons office at Dr Tanisha Meneses  for a follow up appointment.  You should call as soon as you get home or the next day after discharge. Ask to make an appointment in 2 weeks.

## 2021-03-28 NOTE — PROGRESS NOTES
Bedside, Verbal and Written shift change report given to Adrienne (oncoming nurse) by Andie (offgoing nurse). Report included the following information SBAR, Kardex, ED Summary, Intake/Output, MAR, Accordion and Recent Results.

## 2021-03-28 NOTE — PROGRESS NOTES
ORTHO - Progress Note Post Op day: 2 Days Post-Op Heidy Rodríguez     [id number]  male    79 y.o.    1940 Admit date:3/25/2021 Date of Surgery:3/26/2021 Procedures:Procedure(s):LEFT HIP PERCUTANEOUS CANNULATED SCREWS Surgeon:Surgeon(s) and Role:* Unknown [place], DO - Primary SUBJECTIVE: 
  
Heidy Rodríguez is a 79 y.o. male resting in the chair. Patient has complaints of minimal post-op pain, tolerating PO pain medications(tramadol). Denies F/C, nausea, vomiting, dizziness, lightheadedness, chest pain, or shortness of breath. OBJECTIVE: 
 
  
Physical Exam: 
General: alert, cooperative, no distress. Seems baseline parkinson Gastrointestinal:  non-distended . Cardiovascular: equal pulses in the  lower extremities,  Brisk cap refill in all distal extremities Genitourinary: Voiding independently Respiratory: No respiratory distress Neurological:Neurovascular exam within normal limits. Senstion intact: LE bilat. Motor: + DF/PF/EHL. Musculoskeletal: Tree's sign negative in bilateral lower extremities. Calves soft, supple, non-tender upon palpation or with passive stretch. Dressing/Wound:  Clean, dry and intact. No significant erythema or swelling. Vital Signs:  
  
  
Patient Vitals for the past 8 hrs: 
 BP Temp Pulse Resp SpO2  
21 1152 (!) 149/69 97.6 °F (36.4 °C) (!) 56 16 93 % 21 0720 (!) 152/67 98 °F (36.7 °C) 72 16 94 % Temp (24hrs), Av.9 °F (36.6 °C), Min:97.6 °F (36.4 °C), Max:98.4 °F (36.9 °C) Labs:  
  
 
Recent Labs  
  21 
0439 21 
1514 21 
1514 HCT 29.5*   < > 40.6 HGB 9.8*   < > 13.5 INR  --   --  1.0  
 < > = values in this interval not displayed. PT/OT:  
 
  
  
  
ASSESSMENT / PLAN:  
Principal Problem: 
  Closed left hip fracture ([address] (3/25/2021) Active Problems: 
  Hip fracture ([address] (3/25/2021) -  Continue PT/OT WBAT 
-  DVT prophylaxis- SCD w/ Lovenox 30BID per medical service -  DC planning - SNF v/s In-pt rehab per PT recommendations Signed By: Nataliia Sanchez PA-C

## 2021-03-28 NOTE — PROGRESS NOTES
Problem: Falls - Risk of 
Goal: *Absence of Falls Description: Document Yanet Lobe Fall Risk and appropriate interventions in the flowsheet. Outcome: Progressing Towards Goal 
Note: Fall Risk Interventions: 
Mobility Interventions: Assess mobility with egress test, Communicate number of staff needed for ambulation/transfer, OT consult for ADLs, Patient to call before getting OOB, PT Consult for mobility concerns, PT Consult for assist device competence, Strengthening exercises (ROM-active/passive), Utilize walker, cane, or other assistive device, Utilize gait belt for transfers/ambulation, Bed/chair exit alarm Mentation Interventions: Adequate sleep, hydration, pain control, Bed/chair exit alarm, Door open when patient unattended, Evaluate medications/consider consulting pharmacy, Eyeglasses and hearing aids, Familiar objects from home, Family/sitter at bedside, Gait belt with transfers/ambulation, Increase mobility, More frequent rounding, Reorient patient, Room close to nurse's station, Toileting rounds, Update white board Medication Interventions: Assess postural VS orthostatic hypotension, Bed/chair exit alarm, Evaluate medications/consider consulting pharmacy, Patient to call before getting OOB, Teach patient to arise slowly, Utilize gait belt for transfers/ambulation Elimination Interventions: Bed/chair exit alarm, Call light in reach, Elevated toilet seat, Patient to call for help with toileting needs, Stay With Me (per policy), Toilet paper/wipes in reach, Toileting schedule/hourly rounds, Urinal in reach History of Falls Interventions: Bed/chair exit alarm, Consult care management for discharge planning, Door open when patient unattended, Evaluate medications/consider consulting pharmacy, Room close to nurse's station, Utilize gait belt for transfer/ambulation, Vital signs minimum Q4HRs X 24 hrs (comment for end date), Assess for delayed presentation/identification of injury for 48 hrs (comment for end date) Problem: Patient Education: Go to Patient Education Activity Goal: Patient/Family Education Outcome: Progressing Towards Goal 
  
Problem: Pressure Injury - Risk of 
Goal: *Prevention of pressure injury Description: Document Florentin Scale and appropriate interventions in the flowsheet. Outcome: Progressing Towards Goal 
Note: Pressure Injury Interventions: 
Sensory Interventions: Assess changes in LOC, Assess need for specialty bed, Check visual cues for pain, Avoid rigorous massage over bony prominences, Chair cushion, Discuss PT/OT consult with provider, Float heels, Keep linens dry and wrinkle-free, Maintain/enhance activity level, Minimize linen layers, Monitor skin under medical devices, Pad between skin to skin, Pressure redistribution bed/mattress (bed type), Use 30-degree side-lying position, Turn and reposition approx. every two hours (pillows and wedges if needed) Moisture Interventions: Absorbent underpads, Apply protective barrier, creams and emollients, Assess need for specialty bed, Check for incontinence Q2 hours and as needed, Contain wound drainage, Limit adult briefs, Maintain skin hydration (lotion/cream), Minimize layers, Moisture barrier, Offer toileting Q_hr Activity Interventions: Assess need for specialty bed, Chair cushion, PT/OT evaluation, Increase time out of bed, Pressure redistribution bed/mattress(bed type) Mobility Interventions: Assess need for specialty bed, PT/OT evaluation, Pressure redistribution bed/mattress (bed type), HOB 30 degrees or less, Chair cushion, Float heels, Turn and reposition approx. every two hours(pillow and wedges) Nutrition Interventions: Document food/fluid/supplement intake, Discuss nutritional consult with provider, Offer support with meals,snacks and hydration Friction and Shear Interventions: Apply protective barrier, creams and emollients, Feet elevated on foot rest, Foam dressings/transparent film/skin sealants, HOB 30 degrees or less, Lift sheet, Lift team/patient mobility team, Minimize layers Problem: Patient Education: Go to Patient Education Activity Goal: Patient/Family Education Outcome: Progressing Towards Goal 
  
Problem: Patient Education: Go to Patient Education Activity Goal: Patient/Family Education Outcome: Progressing Towards Goal 
  
Problem: Patient Education: Go to Patient Education Activity Goal: Patient/Family Education Outcome: Progressing Towards Goal

## 2021-03-28 NOTE — PROGRESS NOTES
Problem: Mobility Impaired (Adult and Pediatric) Goal: *Acute Goals and Plan of Care (Insert Text) Description: FUNCTIONAL STATUS PRIOR TO ADMISSION: Patient was modified independent using a single point cane for functional mobility. HOME SUPPORT PRIOR TO ADMISSION: The patient lived with his wife that provides transportation and assists with Iadls. Physical Therapy Goals Initiated 3/27/2021 1. Patient will move from supine to sit and sit to supine , scoot up and down, and roll side to side in bed with moderate assistance  within 7 day(s). 2.  Patient will transfer from bed to chair and chair to bed with moderate assistance  using the least restrictive device within 7 day(s). 3.  Patient will perform sit to stand with minimal assistance/contact guard assist within 7 day(s). 4.  Patient will ambulate with minimal assistance/contact guard assist for 50 feet with the least restrictive device within 7 day(s). Outcome: Progressing Towards Goal 
Note: PHYSICAL THERAPY TREATMENT Patient: Zully Cain (37 y.o. male) Date: 3/28/2021 Diagnosis: Hip fracture ([facility] [address] [S72.009A] Closed left hip fracture ([address] Procedure(s) (LRB): LEFT HIP PERCUTANEOUS CANNULATED SCREWS (Left) 2 Days Post-Op Precautions: Fall, WBAT Chart, physical therapy assessment, plan of care and goals were reviewed. ASSESSMENT Patient continues with skilled PT services and is slowly progressing towards goals. Patient supine upon arrival, agreeable to mobility. Assisted patient to sitting position with max assist x 1. Patient able to maintain balance EOB. Sit to stand x 2 with walker and mod assist x 2. Patient unable to bear weight left LE with lean to right and unable to turn to chair on left. Returned to EOB. Assisted patient with stand pivot transfer to right with max assist x 1, mod assist x 1. Chair alarm in place with all needs met at end of session. Recommend inpatient rehab vs. SNF at discharge.  
 
Current Level of Function Impacting Discharge (mobility/balance): max assist 
 
Other factors to consider for discharge: safety awareness; pain control PLAN : 
Patient continues to benefit from skilled intervention to address the above impairments. Continue treatment per established plan of care. to address goals. Recommendation for discharge: (in order for the patient to meet his/her long term goals) Therapy 3 hours per day 5-7 days per week vs. SNF This discharge recommendation: 
Has been made in collaboration with the attending provider and/or case management IF patient discharges home will need the following DME: to be determined (TBD) SUBJECTIVE:  
Patient stated I'll sit up.  OBJECTIVE DATA SUMMARY:  
Critical Behavior: 
Neurologic State: Alert, Eyes open spontaneously Orientation Level: Oriented to person, Oriented to place, Oriented to situation, Disoriented to time Cognition: Follows commands, Decreased attention/concentration, Memory loss, Impulsive, Impaired decision making, Poor safety awareness Safety/Judgement: Fall prevention Functional Mobility Training: 
Bed Mobility: 
Rolling: Moderate assistance;Assist x2 Supine to Sit: Maximum assistance;Assist x2 Scooting: Maximum assistance Transfers: 
Sit to Stand: Moderate assistance;Assist x2 Stand to Sit: Moderate assistance;Assist x2 Bed to Chair: Maximum assistance;Assist x2 Balance: 
Sitting: Impaired Sitting - Static: Fair (occasional) Sitting - Dynamic: Fair (occasional) Standing: Impaired Standing - Static: Constant support;Poor Standing - Dynamic : Constant support;Poor Ambulation/Gait Training: 
  
  
  
  
  
  
  
  
  
  
  
  
  
  
  
  
  
  
  
  
   
 
Therapeutic Exercises:  
Instructed in ankle pumps, LADANYELLE's 
Pain Rating: No number given Activity Tolerance:  
Fair After treatment patient left in no apparent distress:  
Sitting in chair, Call bell within reach, and Bed / chair alarm activated COMMUNICATION/COLLABORATION:  
The patients plan of care was discussed with: Registered nurse. Yosef Rodrigez, PT Time Calculation: 20 mins

## 2021-03-29 NOTE — PROGRESS NOTES
Transition of Care Plan: 
RUR: 16% Disposition: inpatient rehab [facility] Follow up appointments: ortho DME needed: n/a Transportation at Discharge: anticipate BLS [place] or means to access home: n/a IM Medicare letter: to be provided prior to d/c Caregiver Contact: Fab Lucia, [phone] Discharge Caregiver contacted prior to discharge? 2:45pm 
CM made room visit with patient and friend at bedside to provide update on [facility] and possibility of d/c tomorrow if COVID test results. CM answered all of wife's questions. 2:00pm 
CM received phone call from [facility] with [facility] who reported patient has been accepted to [facility]. Per [facility] they have a bed ready for patient once medically clear and COVID test results. CM acknowledged MD note that patient is medically clear for d/c, just waiting on COVID test at this point. Rissa Panda, [address]

## 2021-03-29 NOTE — PROGRESS NOTES
End of Shift Note Bedside shift change report given to [employer] (oncoming nurse) by Naren Mora (offgoing nurse). Report included the following information SBAR and Kardex Shift worked:  [phone] Shift summary and any significant changes:  
 none Concerns for physician to address: none Zone phone for oncoming shift:    
 
Activity: 
Activity Level: Up with Assistance Number times ambulated in hallways past shift: 0 Number of times OOB to chair past shift: 0 Cardiac:  
Cardiac Monitoring: No     
Cardiac Rhythm: Normal sinus rhythm Access:  
Current line(s): PIV Genitourinary:  
Urinary status: incontinent Respiratory:  
O2 Device: Room air Chronic home O2 use?: NO Incentive spirometer at bedside: YES 
  
GI: 
Last Bowel Movement Date: 03/25/21 Current diet:  DIET CARDIAC Regular Passing flatus: YES Tolerating current diet: YES 
% Diet Eaten: 50 % Pain Management:  
Patient states pain is manageable on current regimen: YES Skin: 
Florentin Score: 17 Interventions: float heels Patient Safety: 
Fall Score: Total Score: 5 Interventions: bed/chair alarm and gripper socks High Fall Risk: Yes Length of Stay: 
Expected LOS: 2d 21h Actual LOS: 4 Naren Mora

## 2021-03-29 NOTE — PROGRESS NOTES
Problem: Falls - Risk of 
Goal: *Absence of Falls Description: Document Veto Hull Fall Risk and appropriate interventions in the flowsheet. Outcome: Progressing Towards Goal 
Note: Fall Risk Interventions: 
Mobility Interventions: Bed/chair exit alarm Mentation Interventions: Bed/chair exit alarm Medication Interventions: Bed/chair exit alarm Elimination Interventions: Bed/chair exit alarm History of Falls Interventions: Bed/chair exit alarm Problem: Pressure Injury - Risk of 
Goal: *Prevention of pressure injury Description: Document Florentin Scale and appropriate interventions in the flowsheet. Outcome: Progressing Towards Goal 
Note: Pressure Injury Interventions: 
Sensory Interventions: Assess need for specialty bed Moisture Interventions: Absorbent underpads Activity Interventions: Assess need for specialty bed Mobility Interventions: HOB 30 degrees or less Nutrition Interventions: Document food/fluid/supplement intake Friction and Shear Interventions: Minimize layers

## 2021-03-29 NOTE — PROGRESS NOTES
Hospitalist Progress Note NAME: Zully Cain :  1940 MRN:  [id number] Assessment / Plan: 
 
Acute left hip fracture, POA Mechanical fall, POA Vesna Ambriz revealed acute minimally displaced left hip fracture Status post Left short cephalomedullary nail  Julian Travis and DVT prophylaxis as per Ortho recommendations PT OT evaluation, recommending SNF. COVID-19 test was ordered for placement.  to follow. Patient is medically stable to be discharged. Parkinson disease, POA Continue home Sinemet Coronary artery disease, POA History of CVA, POA Hypertension, POA Hyperlipidemia, POA Continue home aspirin, statin, amlodipine, metoprolol as tolerated CKD stage III, POA Creatinine baseline. Creatinine is back to baseline IV fluids was ordered overnight, continue An Galindo is able to urinate with no concerns of urinary retention at this point Code status: Full Prophylaxis: As per Ortho recommendations Recommended Disposition: SNF. Medically stable to be discharged once facility is available. COVID-19 test for placement was ordered. Subjective: Chief Complaint / Reason for Physician Visit Discussed with RN events overnight. Patient was seen and examined. No acute events overnight. \"Feeling all right\" Review of Systems: 
Symptom Y/N Comments  Symptom Y/N Comments Fever/Chills n   Chest Pain n   
Poor Appetite    Edema Cough n   Abdominal Pain n   
Sputum    Joint Pain SOB/MARTINEZ n   Pruritis/Rash Nausea/vomit n   Tolerating PT/OT Diarrhea    Tolerating Diet y Constipation    Other Could NOT obtain due to:   
 
 
 
Objective: VITALS:  
Last 24hrs VS reviewed since prior progress note. Most recent are: 
Patient Vitals for the past 24 hrs: 
 Temp Pulse Resp BP SpO2  
21 0745 97.8 °F (36.6 °C) 66 18 (!) 175/84 98 % 21 0315 98.1 °F (36.7 °C) (!) 52 16 (!) 142/83 100 % 21 2357 97.4 °F (36.3 °C) 81 16 (!) 144/74 98 % 03/28/21 1933 97.6 °F (36.4 °C) 78 16 131/70 97 % 03/28/21 1556 98 °F (36.7 °C) 67 16 (!) 148/73 97 % 03/28/21 1152 97.6 °F (36.4 °C) (!) 56 16 (!) 149/69 93 % Intake/Output Summary (Last 24 hours) at 3/29/2021 1114 Last data filed at 3/28/2021 2148 Gross per 24 hour Intake 480 ml Output  Net 480 ml I had a face to face encounter and independently examined this patient on 3/29/2021, as outlined below: PHYSICAL EXAM: 
General: WD, WN. Alert, cooperative, no acute distress EENT:  EOMI. Anicteric sclerae. MMM Resp:  CTA bilaterally, no wheezing or rales. No accessory muscle use CV:  Regular  rhythm,  No edema GI:  Soft, Non distended, Non tender. +Bowel sounds Neurologic:  Alert and oriented X 3, normal speech, Psych:   Good insight. Not anxious nor agitated Skin:  No rashes. No jaundice Reviewed most current lab test results and cultures  YES Reviewed most current radiology test results   YES Review and summation of old records today    NO Reviewed patient's current orders and MAR    YES 
PMH/SH reviewed - no change compared to H&P 
________________________________________________________________________ Care Plan discussed with: 
  Comments Patient x Family RN x Care Manager Consultant Multidiciplinary team rounds were held today with , nursing, pharmacist and clinical coordinator. Patient's plan of care was discussed; medications were reviewed and discharge planning was addressed. ________________________________________________________________________ Total NON critical care TIME: 36   Minutes Total CRITICAL CARE TIME Spent:   Minutes non procedure based Comments >50% of visit spent in counseling and coordination of care    
________________________________________________________________________ Dayanna Marion MD  
 
Procedures: see electronic medical records for all procedures/Xrays and details which were not copied into this note but were reviewed prior to creation of Plan. LABS: 
I reviewed today's most current labs and imaging studies. Pertinent labs include: 
Recent Labs  
  03/29/21 0317 03/28/21 0439 03/27/21 0459 WBC 6.2 8.1 8.7 HGB 10.4* 9.8* 11.1*  
HCT 31.1* 29.5* 33.1*  
 145* 166 Recent Labs  
  03/29/21 0317 03/28/21 0439 03/27/21 0459  139 137  
K 4.1 4.3 4.9  112* 108 CO2 26 24 24 GLU 90 97 128* BUN 24* 28* 22* CREA 1.67* 2.14* 2.01* CA 9.2 7.8* 8.5 Signed: Armando Hobbs MD

## 2021-03-29 NOTE — PROGRESS NOTES
Problem: Mobility Impaired (Adult and Pediatric) Goal: *Acute Goals and Plan of Care (Insert Text) Description: FUNCTIONAL STATUS PRIOR TO ADMISSION: Patient was modified independent using a single point cane for functional mobility. HOME SUPPORT PRIOR TO ADMISSION: The patient lived with his wife that provides transportation and assists with Iadls. Physical Therapy Goals Initiated 3/27/2021 1. Patient will move from supine to sit and sit to supine , scoot up and down, and roll side to side in bed with moderate assistance  within 7 day(s). 2.  Patient will transfer from bed to chair and chair to bed with moderate assistance  using the least restrictive device within 7 day(s). 3.  Patient will perform sit to stand with minimal assistance/contact guard assist within 7 day(s). 4.  Patient will ambulate with minimal assistance/contact guard assist for 50 feet with the least restrictive device within 7 day(s). Outcome: Progressing Towards Goal 
 
PHYSICAL THERAPY TREATMENT Patient: Bucky Francois (43 y.o. male) Date: 3/29/2021 Diagnosis: Hip fracture ([facility] [address] [S72.009A] Closed left hip fracture ([address] Procedure(s) (LRB): LEFT HIP PERCUTANEOUS CANNULATED SCREWS (Left) 3 Days Post-Op Precautions: Fall, WBAT Chart, physical therapy assessment, plan of care and goals were reviewed. ASSESSMENT Patient continues with skilled PT services and is progressing towards goals. Pt was received in supine and cleared by nursing to mobilize. He required 2 person A for bed mobility, LLE extremely stiff and resistant. Raised the height of the bed to stand with RW, he required assistance with hand placement and noted shift to the R and significant lean. Unable to progress LEs and required 2 person to pivot to the chair. Worked on trying to stand from chair, difficulty with forward lean despite verbal and tactile cues. Able to come to full stand with 2 person and RW.  Left sitting up, encouraged only 45 minutes to prevent fatigue and ice applied. Current Level of Function Impacting Discharge (mobility/balance): mod/max A x 2 Other factors to consider for discharge: stroke that affected the L 
    
 
PLAN : 
Patient continues to benefit from skilled intervention to address the above impairments. Continue treatment per established plan of care. to address goals. Recommendation for discharge: (in order for the patient to meet his/her long term goals) Therapy 3 hours per day 5-7 days per week This discharge recommendation: 
Has been made in collaboration with the attending provider and/or case management IF patient discharges home will need the following DME: to be determined (TBD) SUBJECTIVE:  
Patient stated i cant step with that today.  OBJECTIVE DATA SUMMARY:  
Critical Behavior: 
Neurologic State: Confused Orientation Level: Oriented to person, Oriented to place Cognition: Follows commands Safety/Judgement: Fall prevention Functional Mobility Training: 
Bed Mobility: 
Rolling: Moderate assistance;Assist x2 Supine to Sit: Maximum assistance;Assist x2 Scooting: Moderate assistance Transfers: 
Sit to Stand: Moderate assistance;Maximum assistance;Assist x2 Stand to Sit: Moderate assistance;Assist x2 Bed to Chair: Maximum assistance;Assist x2 Balance: 
Sitting: Impaired Sitting - Static: Good (unsupported) Sitting - Dynamic: Fair (occasional) Standing: Impaired Standing - Static: Poor;Constant support Standing - Dynamic : Poor;Constant support Pain Rating: 
Complaint when standing, but will say 0/10 at rest 
 
Activity Tolerance:  
Fair After treatment patient left in no apparent distress:  
Sitting in chair, Call bell within reach, and Bed / chair alarm activated COMMUNICATION/COLLABORATION:  
The patients plan of care was discussed with: Occupational therapist and Registered nurse. Jacque Traore, PT, DPT  Time Calculation: 23 mins

## 2021-03-29 NOTE — PROGRESS NOTES
Orthopedic NP Progress Note Post Op day: 3 Days Post-Op March 29, 2021 12:09 PM  
 
Xiang Lawrence Attending Physician: Treatment Team: Attending Provider: Su Barillas MD; Consulting Provider: Logan Her DO; Consulting Provider: Lizette Euceda; Utilization Review: Andre Treviño RN; Care Manager: True Fines; Utilization Review: Radha Grande RN; Primary Nurse: Yumiko Viramontes Vital Signs:   
Patient Vitals for the past 8 hrs: 
 BP Temp Pulse Resp SpO2 Weight  
03/29/21 1115 108/66 98 °F (36.7 °C) 68 18 97 %   
03/29/21 0745 (!) 175/84 97.8 °F (36.6 °C) 66 18 98 %   
03/29/21 0607      76 kg (167 lb 8 oz) BMI (calculated): 20.9 (03/29/21 1070) Intake/Output: 
No intake/output data recorded. 03/27 1901 - 03/29 0700 In: 480 [P.O.:480] Out: 550 [Urine:550] Pain Control:  
Pain Assessment Pain Scale 1: Visual 
Pain Intensity 1: 0 Pain Onset 1: postop Pain Location 1: Hip Pain Orientation 1: Left Pain Description 1: Aching Pain Intervention(s) 1: Declines LAB:   
Recent Labs  
  03/29/21 
0317 HCT 31.1* HGB 10.4* Lab Results Component Value Date/Time Sodium 138 03/29/2021 03:17 AM  
 Potassium 4.1 03/29/2021 03:17 AM  
 Chloride 107 03/29/2021 03:17 AM  
 CO2 26 03/29/2021 03:17 AM  
 Glucose 90 03/29/2021 03:17 AM  
 BUN 24 (H) 03/29/2021 03:17 AM  
 Creatinine 1.67 (H) 03/29/2021 03:17 AM  
 Calcium 9.2 03/29/2021 03:17 AM  
 
 
Subjective:  Dejuan Pierce is a 79 y.o. male s/p a  Procedure(s): LEFT HIP PERCUTANEOUS CANNULATED SCREWS Procedure(s): LEFT HIP PERCUTANEOUS CANNULATED SCREWS. Tolerating diet. Pain is well managed, pt working with PT to stand at this time Objective: General: alert, cooperative, no distress. Neuro/Vascular: CNS Intact. Sensation stable. Brisk cap refill, + pulses UE/LE Musculoskeletal:  +ROM UE/LE. Skin: Incision - clean, dry and intact. No significant erythema or swelling.    
Dressing: clean, dry, and intact Amezcua - n Drain - n 
  
 
PT/OT:  
Gait:    
            
 
 
Assessment:  
 s/p Procedure(s): LEFT HIP PERCUTANEOUS CANNULATED SCREWS Principal Problem: 
  Closed left hip fracture ([address] (3/25/2021) Active Problems: 
  Hip fracture ([address] (3/25/2021) Plan:  
-  Continue PT/OT - WBAT  
-  Continue established methods of pain control 
-  VTE Prophylaxes - TEDS &/or SCDs with lovneox for 21 days -  GI Prophylaxis - pepcid Discharge To:  SNF Signed By: Awais Walls NP Orthopedic Nurse Practitioner

## 2021-03-29 NOTE — PROGRESS NOTES
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 Occupational Therapy Goals: 
Initiated 3/27/2021 1. Patient will perform grooming seated with good balance and supervision within 7 days. 2. Patient will perform lower body dressing with minimal assistance within 7 days. 3. Patient will perform toileting with minimal assistance within 7 days. 4. Patient will perform bathing with min assist within 7 days. 5. Patient will transfer from bedside commode or toilet with minimal assistance using the least restrictive device and appropriate durable medical equipment within 7 days. Outcome: Progressing Towards Goal 
 OCCUPATIONAL THERAPY TREATMENT Patient: Gerber Welch (85 y.o. male) Date: 3/29/2021 Diagnosis: Hip fracture ([facility] [S72.009A] Closed left hip fracture ([facility] Procedure(s) (LRB): LEFT HIP PERCUTANEOUS CANNULATED SCREWS (Left) 3 Days Post-Op Precautions: Fall, WBAT Chart, occupational therapy assessment, plan of care, and goals were reviewed. ASSESSMENT Patient continues with skilled OT services and is slowly progressing towards goals. Pt is tolerating EOB ADL task performance with increasing lengths of time up to 15 minutes, yet continues to remain most limited d/t L hip p! with mobility, poor standing tolerance at RW and significantly decreased functional mobility.  Pt was unable to advance B LEs during attempt at chair transfer this date following EOB ADL task performance, and required Max Assist x2 to perform stand pivot to chair. Pt is in need of rehab once medically cleared in order to facilitate safe return to his Mod I PLOF using a SPC. Current Level of Function Impacting Discharge (ADLs): Up to Total A Other factors to consider for discharge: Parkinson's, L sided weakness from prior CVA. PLAN : 
Patient continues to benefit from skilled intervention to address the above impairments. Continue treatment per established plan of care to address goals. Recommend with staff: Continue bladder/bowel hygiene at bed level, pending further progress. x2 assist using gait belt for bed<>chair transfers. Encourage active participation in ADL tasks. Recommend next OT session: EOB ADL tasks, BSC transfer trial.  
 
Recommendation for discharge: (in order for the patient to meet his/her long term goals) Rehab This discharge recommendation: 
Has been made in collaboration with the attending provider and/or case management IF patient discharges home will need the following DME: Defer to rehab, yet if he D/C home, he would require a BSC, W/C and transfer chair, gait belt. SUBJECTIVE:  
Patient stated I don't trust this leg. \" (referring to L LE during chair transfer). OBJECTIVE DATA SUMMARY:  
Cognitive/Behavioral Status: 
Neurologic State: Alert Orientation Level: Oriented X4(periodic confusion ) Cognition: Follows commands Functional Mobility and Transfers for ADLs: 
Bed Mobility: 
Rolling: Moderate assistance;Assist x2 Supine to Sit: Maximum assistance;Assist x2 Scooting: Moderate assistance Transfers: 
Sit to Stand: Moderate assistance;Maximum assistance;Assist x2 Bed to Chair: Maximum assistance;Assist x2 Balance: 
Sitting: Impaired Sitting - Static: Good (unsupported) Sitting - Dynamic: Fair (occasional) Standing: Impaired Standing - Static: Poor;Constant support Standing - Dynamic : Poor;Constant support ADL Intervention: 
  
Guided Pt through UB ADL task at EOB with simple verbal cues for initiation and Min A for tie at neck/torso d/t decreased dexterity/grasp in L hand from prior CVA. Challenged Pts standing tolerance at RW during bladder hygiene and doffing/donning of protective undergarment 2/2 episode of urinary incontinence. Provided MAX multimodal cues to ensure safety with sit<>stand transfer and maintaining standing balance at RW t/o ADL task and chair transfer. Upper Body Dressing Assistance Hospital Gown: Minimum  assistance(w/ tie at [facility]) Cues: Physical assistance;Verbal cues provided Lower Body Dressing Assistance Protective Undergarmet: Total assistance (dependent)(standing EOB) Socks: Total assistance (dependent) Position Performed: Supine Pain: 
Pt verbalized he experienced increased pain with functional mobility, yet it did not interfere with session progress. Activity Tolerance:  
Fair and requires rest breaks After treatment patient left in no apparent distress:  
Sitting in chair, Call bell within reach and Bed / chair alarm activated COMMUNICATION/COLLABORATION:  
The patients plan of care was discussed with: Physical therapist, Registered nurse and Patient. [facility], OTR/L Time Calculation: 33 mins

## 2021-03-30 NOTE — PROGRESS NOTES
Sbar Reported called  [facility] to nurse Courtney Anaya RN at [phone] with the opportunity for questions and clarification. Pt going to room 3154.

## 2021-03-30 NOTE — PROGRESS NOTES
Bedside and Verbal shift change report given to Nahomi Franklin (oncoming nurse) by Sonny Lentz (offgoing nurse). Report included the following information SBAR, Kardex, Intake/Output, MAR and Recent Results.

## 2021-03-30 NOTE — DISCHARGE SUMMARY
Hospitalist Discharge Summary Patient ID: Gaudencio Diana [id number] 
83 y.o. 
1940 
3/25/2021 PCP on record: Estefany Jon DO Admit date: 3/25/2021 Discharge date and time: 3/30/2021 DISCHARGE DIAGNOSIS: 
 
Acute left hip fracture, POA Mechanical fall, POA 
 
CONSULTATIONS: 
IP CONSULT TO ORTHOPEDIC SURGERY Excerpted HPI from H&P of Vee Macias MD: Will Davidson is a 79 y.o.  male who presents with the above CC. Patient went to urgent care for constipation and while trying to get out of car he tripped and fell on his hip and started having severe hip pain for which he presented to the emergency room. X-ray showed left hip fracture. Patient reported history of CAD, hypertension, Parkinson's disease, hyperlipidemia, 4 strokes (S1 May 2019) and left lower extremity vascular stent. Patient denies any significant recent cardiac/respiratory problems. Denies having any recent surgeries. Patient denies loss of consciousness, headache, neck pain/back pain, shortness of breath, chest pain, lower extremity edema. Blood work with BUN 21, creatinine 1.89, alkaline phosphatase 124, glucose 115, INR within normal limits. 
  
EKG with sinus rhythm and PVCs with nonspecific ST/T wave changes. ______________________________________________________________________ DISCHARGE SUMMARY/HOSPITAL COURSE:  for full details see H&P, daily progress notes, labs, consult notes. Acute left hip fracture, POA Mechanical fall, POA Caralyn Jabier revealed acute minimally displaced left hip fracture Status post Left short cephalomedullary nail 03/26 Analgesia and DVT prophylaxis as per Ortho recommendations. Lovenox x21 days PT OT evaluation, recommending SNF.   
  
 
 
_______________________________________________________________________ Patient seen and examined by me on discharge day. Pertinent Findings: 
Gen:    Not in distress Chest: Clear lungs CVS:   Regular rhythm. No edema Abd:  Soft, not distended, not tender Neuro:  Alert,  
_______________________________________________________________________ DISCHARGE MEDICATIONS:  
Current Discharge Medication List  
  
START taking these medications Details  
enoxaparin (LOVENOX) 30 mg/0.3 mL injection 0.3 mL by SubCUTAneous route every twelve (12) hours every twelve (12) hours for 21 days. Qty: 12.6 mL, Refills: 0  
  
oxyCODONE IR (ROXICODONE) 5 mg immediate release tablet Take 0.5 Tabs by mouth every four (4) hours as needed (For pain 3-6) for up to 3 days. Max Daily Amount: 15 mg. 
Qty: 10 Tab, Refills: 0 Associated Diagnoses: Closed fracture of left hip, [facility] Closed fracture of right hip, initial encounter [facility] CONTINUE these medications which have CHANGED Details  
citalopram (CeleXA) 10 mg tablet Take 1 Tab by mouth every evening. Qty: 10 Tab, Refills: 0 CONTINUE these medications which have NOT CHANGED Details  
carbidopa-levodopa (SINEMET)  mg per tablet Take 1 Tab by mouth three (3) times daily. Qty: 90 Tab, Refills: 3  
  
metoprolol succinate (TOPROL-XL) 25 mg XL tablet Take 1 Tab by mouth daily. Qty: 30 Tab, Refills: 6  
  
pravastatin (PRAVACHOL) 80 mg tablet Take 1 Tab by mouth daily. Qty: 30 Tab, Refills: 6  
  
polyethylene glycol (MIRALAX) 17 gram/dose powder Take 17 g by mouth daily as needed (constipation). calcium-cholecalciferol, D3, (CALTRATE 600+D) tablet Take 1 Tab by mouth daily. raNITIdine (ZANTAC) 150 mg tablet Take 150 mg by mouth daily. amLODIPine (NORVASC) 10 mg tablet Take 10 mg by mouth daily. aspirin 81 mg chewable tablet Take 81 mg by mouth daily. Patient Follow Up Instructions: Activity: Activity as tolerated Diet: Resume previous diet Follow-up Information Follow up With Specialties Details Why Contact Info  Tip Showers, DO Orthopedic Surgery In 2 weeks For Follow up after surgery [address] 
[phone] William Sousa [address] [phone] Kristie Reyna DO Orthopedic Surgery In 2 weeks  [address] 
[phone] 
  
  
 
________________________________________________________________ Risk of deterioration: Low 
 
Condition at Discharge:  Stable 
__________________________________________________________________ Disposition SNF/LTC 
 
____________________________________________________________________ Code Status: Full Code 
___________________________________________________________________ Total time in minutes spent coordinating this discharge (includes going over instructions, follow-up, prescriptions, and preparing report for sign off to her PCP) :  >30 minutes Signed: 
Damion Abrams MD

## 2021-03-30 NOTE — PROGRESS NOTES
Transition of Care Plan: 
RUR: 17% Disposition: inpatient rehab @ [facility] Follow up appointments: ortho DME needed: n/a Transportation at Discharge: [facility] @ 3:45pm 
[place] or means to access home: n/a IM Medicare letter: provided Caregiver Contact: Cynthia Doran, [phone] Discharge Caregiver contacted prior to discharge? yes EMTALA- accepting physician Dr. Renny Bartholomew Call report number [phone] going to room [facility] [phone] [facility] @ 3:45pm 
 
2:25pm 
CM informed by unit secretary that [facility] called the unit phone and reported they are delayed and will be here ETA 3:45pm.   
 
2:00pm 
CM informed bedside RN of d/c at 3:15pm. CM made room visit with patient and- informed him of d/c today, patient agreed. CM contacted patient's life long friend/partner Ada Weaver [phone] and informed her of d/c today to [facility] around 3:15pm, Hesham Murray agreed with plan. CM verbally explained 2nd IM letter to Hesham Murray and left a copy of 2nd IM letter at patient's bedside. 1:50pm 
Anthony Gardner with [facility] contacted CM back and they can accept patient today. CM informed Anthony Gardner that transport is at 3:15pm. Anthony Gardner to call CM back with EMTALA information and call report number. CM sent MD message informing him that COVID test is back and [facility] has a bed. CM informed MD that transport is at 3:15pm and will be an EMTALA transport. 1:45pm 
JESSICA acknowledged COVID test resulted negative. JESSICA promptly contacted Anthony Gardner at [facility] who is asking the admissions team if they can still take patient today. Anthony Gardner to call CM back. 11:20am 
CM received a call from Anthony Gardner at [facility] inquiring about an update on COVID test. JESSICA informed Anthony Gardner that CM has been checking patient's chart multiple times throughout the morning waiting on results but it is still pending. Anthony Gardner reported that if we do not have results back soon she will have to give patient's bed up to someone else.   
 
JESSICA reached out to [address] [place] [address] and was informed that patient's test is being ran right now and will have an answer by 4:00pm.  
 
JESSICA contacted Anirudh Garcia at [facility] to inform her of the above information. Per Anirudh Garcia she will need an answer by 1:00pm. If not, she will have to give patient's bed up to someone else. Per Anirudh Garcia if patient's test comes back later in the day and another admission falls through, she could potentially accept patient. If not, then they will have a bed for patient tomorrow. 11:00pm 
JESSICA arranged [facility] transport for 3:15pm in hopes to have COVID test results before then. 9:24 AM 
JESSICA acknowledged d/c order. Patient's COVID test needs to be resulted before he can d/c to [facility]. 
 
9:00am 
JESSICA spoke with Anirudh Garcia at [facility] who reported that they are ready for patient once COVID test is resulted. Artemio Mascorro, [address], [address]

## 2021-03-30 NOTE — PROGRESS NOTES
End of Shift Note Bedside shift change report given to Gustavo (oncoming nurse) by Vikash Pop (offgoing nurse). Report included the following information SBAR, Kardex, Intake/Output, MAR and Accordion Shift worked:  [id number] Shift summary and any significant changes:  
  COVID test completed today Concerns for physician to address:   
  
Zone phone for oncoming shift:   [id number] Activity: 
Activity Level: Up with Assistance Number times ambulated in hallways past shift: 0 Number of times OOB to chair past shift: 1 Cardiac:  
Cardiac Monitoring: No     
Cardiac Rhythm: Normal sinus rhythm Access:  
Current line(s): PIV Genitourinary:  
Urinary status: voiding and incontinent Respiratory:  
O2 Device: Room air Chronic home O2 use?: NO Incentive spirometer at bedside: NO 
  
GI: 
Last Bowel Movement Date: 03/25/21 Current diet:  DIET CARDIAC Regular Passing flatus: YES Tolerating current diet: YES 
% Diet Eaten: 50 % Pain Management:  
Patient states pain is manageable on current regimen: YES Skin: 
Florentin Score: 17 Interventions: float heels and increase time out of bed Patient Safety: 
Fall Score: Total Score: 5 Interventions: bed/chair alarm, assistive device (walker, cane, etc), gripper socks and pt to call before getting OOB High Fall Risk: Yes Length of Stay: 
Expected LOS: 2d 21h Actual LOS: 4 Vikash Pop

## 2021-03-30 NOTE — PROGRESS NOTES
Orthopedic NP Progress Note Post Op day: 4 Days Post-Op March 30, 2021 9:23 AM  
 
Yeimi Silva Attending Physician: Treatment Team: Attending Provider: Magdiel Huang MD; Consulting Provider: Danii Merrill DO; Consulting Provider: Orville Anderson; Utilization Review: Vidal Benjamin RN; Care Manager: Derik Hernandez; Utilization Review: Maxi Ram RN; Care Manager: Jacqueline Matos Vital Signs:   
Patient Vitals for the past 8 hrs: 
 BP Temp Pulse Resp SpO2 Weight 03/30/21 0757 (!) 145/77 98.1 °F (36.7 °C) 63 18 97 %   
03/30/21 0600      76.7 kg (169 lb 1.6 oz) 03/30/21 0400 (!) 142/72 98.3 °F (36.8 °C) 60 18 96 %  BMI (calculated): 21.1 (03/30/21 0600) Intake/Output: 
No intake/output data recorded. 03/28 1901 - 03/30 0700 In: 480 [P.O.:480] Out: -  
 
Pain Control:  
Pain Assessment Pain Scale 1: Numeric (0 - 10) Pain Intensity 1: 0 Pain Onset 1: postop Pain Location 1: Hip Pain Orientation 1: Left Pain Description 1: Aching Pain Intervention(s) 1: Declines LAB:   
Recent Labs  
  03/29/21 
0317 HCT 31.1* HGB 10.4* Lab Results Component Value Date/Time Sodium 138 03/29/2021 03:17 AM  
 Potassium 4.1 03/29/2021 03:17 AM  
 Chloride 107 03/29/2021 03:17 AM  
 CO2 26 03/29/2021 03:17 AM  
 Glucose 90 03/29/2021 03:17 AM  
 BUN 24 (H) 03/29/2021 03:17 AM  
 Creatinine 1.67 (H) 03/29/2021 03:17 AM  
 Calcium 9.2 03/29/2021 03:17 AM  
 
 
Subjective:  Klever Tyler is a 79 y.o. male s/p a  Procedure(s): LEFT HIP PERCUTANEOUS CANNULATED SCREWS Procedure(s): LEFT HIP PERCUTANEOUS CANNULATED SCREWS. Tolerating diet. Pain well managed Objective: General: alert, cooperative, no distress. Neuro/Vascular: CNS Intact. Sensation stable. Brisk cap refill, + pulses UE/LE Musculoskeletal:  +ROM UE/LE. Skin: Incision - clean, dry and intact. No significant erythema or swelling. Dressing: clean, dry, and intact Amezcua - n Drain - n 
  
 
PT/OT:  
Gait:    
            
 
 
Assessment:  
 s/p Procedure(s): LEFT HIP PERCUTANEOUS CANNULATED SCREWS Principal Problem: 
  Closed left hip fracture ([address] (3/25/2021) Active Problems: 
  Hip fracture ([address] (3/25/2021) Plan:  
 
-  Continue PT/OT - WBAT  
-  Continue established methods of pain control 
-  VTE Prophylaxes - TEDS &/or SCDs with lovneox for 21 days -  GI Prophylaxis - pepcid  
  
  
Discharge To:   
  
 
Signed By: Raj Garcia NP Orthopedic Nurse Practitioner

## 2021-03-30 NOTE — PROGRESS NOTES
End of Shift Note Bedside shift change report given to [place], RN (oncoming nurse) by Severa Ship, RN (offgoing nurse). Report included the following information SBAR, Kardex, Intake/Output, MAR and Accordion Shift worked:  7p-7a Shift summary and any significant changes:  
  Pt rested well overnight. Incont Concerns for physician to address:   
  
Zone phone for oncoming shift:   [id number] Activity: 
Activity Level: Up with Assistance Number times ambulated in hallways past shift: 0 Number of times OOB to chair past shift: 1 Cardiac:  
Cardiac Monitoring: No     
Cardiac Rhythm: Normal sinus rhythm Access:  
Current line(s): PIV Genitourinary:  
Urinary status: voiding and incontinent Respiratory:  
O2 Device: Room air Chronic home O2 use?: NO Incentive spirometer at bedside: NO 
  
GI: 
Last Bowel Movement Date: 03/25/21 Current diet:  DIET CARDIAC Regular Passing flatus: YES Tolerating current diet: YES 
% Diet Eaten: 50 % Pain Management:  
Patient states pain is manageable on current regimen: YES Skin: 
Florentin Score: 17 Interventions: float heels and increase time out of bed Patient Safety: 
Fall Score: Total Score: 5 Interventions: bed/chair alarm, assistive device (walker, cane, etc), gripper socks and pt to call before getting OOB High Fall Risk: Yes Length of Stay: 
Expected LOS: 2d 21h Actual LOS: 5 Severa Ship, RN

## 2021-04-06 NOTE — PROGRESS NOTES
S/W Vamsi at [address] [place] [address]. Scheduled pt for post op appt. Faxed XR order to [phone]. They will obtain images from [employer] and send them on a CD with the patient.

## 2021-04-08 NOTE — PROGRESS NOTES
Bryanna with [facility] LVM to cancel pt's appointment. She stated no c/b was necessary, they would c/b to get him r/s at a later time. No number was left to c/b either.

## 2021-05-24 PROBLEM — R00.0 WIDE-COMPLEX TACHYCARDIA: Status: ACTIVE | Noted: 2021-01-01

## 2021-05-24 NOTE — ED PROVIDER NOTES
EMERGENCY DEPARTMENT HISTORY AND PHYSICAL EXAM 
 
 
Date: 5/24/2021 Patient Name: Bonnie Romero History of Presenting Illness Chief Complaint Patient presents with  Irregular Heart Beat  
  afib, no history of afib  Altered mental status  
  not acting normal self per home health, last known well is Wednesday History Provided By: EMS 
 
HPI: Bonnie Romero, 79 y.o. male  presents to the ED with cc of altered mental status and runs of ventricular tachycardia. Patient has a history of hip fracture in which she was in our hospital a few months ago. He went to rehab and then believe is at home now. He has been seen by home health and EMS was called due to altered mental status. Patient has had a decline over the past days to weeks. He has not been eating or drinking. He is confused and lethargic. EMS notes he has had multiple rounds of ventricular tachycardia with pulse. Patient is unable to provide a history. Past History Past Medical History: 
Past Medical History:  
Diagnosis Date  CAD (coronary artery disease)  High cholesterol  Hypertension  Parkinson disease ([facility] [address]  Stroke [facility]) 2006  
 left sided weakness Past Surgical History: 
Past Surgical History:  
Procedure Laterality Date  HX CATARACT REMOVAL Bilateral   
 HX HEART CATHETERIZATION  2006  CA COLONOSCOPY FLX DX W/COLLJ SPEC WHEN PFRMD  5/12/2014 Medications: No current facility-administered medications on file prior to encounter. Current Outpatient Medications on File Prior to Encounter Medication Sig Dispense Refill  citalopram (CeleXA) 10 mg tablet Take 1 Tab by mouth every evening. 10 Tab 0  carbidopa-levodopa (SINEMET)  mg per tablet Take 1 Tab by mouth three (3) times daily. 90 Tab 3  
 metoprolol succinate (TOPROL-XL) 25 mg XL tablet Take 1 Tab by mouth daily. 30 Tab 6  pravastatin (PRAVACHOL) 80 mg tablet Take 1 Tab by mouth daily. 30 Tab 6  polyethylene glycol (MIRALAX) 17 gram/dose powder Take 17 g by mouth daily as needed (constipation).  calcium-cholecalciferol, D3, (CALTRATE 600+D) tablet Take 1 Tab by mouth daily.  raNITIdine (ZANTAC) 150 mg tablet Take 150 mg by mouth daily.  amLODIPine (NORVASC) 10 mg tablet Take 10 mg by mouth daily.  aspirin 81 mg chewable tablet Take 81 mg by mouth daily. Family History: No family history on file. Social History: 
Social History Tobacco Use  Smoking status: Former Smoker  Smokeless tobacco: Never Used Substance Use Topics  Alcohol use: No  
 Drug use: Never Allergies: 
No Known Allergies All the above components of the past  history are auto-populated from the electronic record. They have been reviewed and the patient has been interviewed for any pertinent past history that pertains to the patient's chief complaint and reason for visit. Not all pre-populated components may be accurate at the time this note was generated. Review of Systems Review of Systems Unable to perform ROS: Mental status change Physical Exam  
Physical Exam 
Vitals and nursing note reviewed. Constitutional:   
   Appearance: He is well-developed. He is ill-appearing. HENT:  
   Head: Normocephalic. Eyes:  
   Conjunctiva/sclera: Conjunctivae normal.  
Cardiovascular:  
   Rate and Rhythm: Tachycardia present. Rhythm irregular. Pulmonary:  
   Effort: Pulmonary effort is normal. No accessory muscle usage or respiratory distress. Abdominal:  
   General: There is no distension. Musculoskeletal:  
   Cervical back: Normal range of motion. Skin: 
   General: Skin is warm and dry. Neurological:  
   Mental Status: He is lethargic, disoriented and confused.   
 
 
 
Due to the COVID-19 pandemic, in order to reduce the spread and transmission of the virus, some basic elements of the physical exam have been deferred to reduce direct or close contact with the patient unless they are deemed to be absolutely necessary, regardless of whether the virus is highly suspected or not. Diagnostic Study Results Labs - Recent Results (from the past 24 hour(s)) CBC WITH AUTOMATED DIFF Collection Time: 05/24/21  4:11 PM  
Result Value Ref Range WBC 8.0 4.1 - 11.1 K/uL  
 RBC 3.49 (L) 4.10 - 5.70 M/uL  
 HGB 10.6 (L) 12.1 - 17.0 g/dL HCT 33.0 (L) 36.6 - 50.3 % MCV 94.6 80.0 - 99.0 FL  
 MCH 30.4 26.0 - 34.0 PG  
 MCHC 32.1 30.0 - 36.5 g/dL  
 RDW 13.3 11.5 - 14.5 % PLATELET 827 878 - 990 K/uL MPV 10.3 8.9 - 12.9 FL  
 NRBC 0.0 0  WBC ABSOLUTE NRBC 0.00 0.00 - 0.01 K/uL NEUTROPHILS 77 (H) 32 - 75 % BAND NEUTROPHILS 9 % LYMPHOCYTES 6 (L) 12 - 49 % MONOCYTES 6 5 - 13 % EOSINOPHILS 0 0 - 7 % BASOPHILS 1 0 - 1 % METAMYELOCYTES 1 % IMMATURE GRANULOCYTES 0 0.0 - 0.5 % ABS. NEUTROPHILS 6.9 1.8 - 8.0 K/UL  
 ABS. LYMPHOCYTES 0.5 (L) 0.8 - 3.5 K/UL  
 ABS. MONOCYTES 0.5 0.0 - 1.0 K/UL  
 ABS. EOSINOPHILS 0.0 0.0 - 0.4 K/UL  
 ABS. BASOPHILS 0.1 0.0 - 0.1 K/UL  
 ABS. IMM. GRANS. 0.0 0.00 - 0.04 K/UL  
 DF MANUAL    
 RBC COMMENTS NORMOCYTIC, NORMOCHROMIC METABOLIC PANEL, COMPREHENSIVE Collection Time: 05/24/21  4:11 PM  
Result Value Ref Range Sodium 148 (H) 136 - 145 mmol/L Potassium 3.7 3.5 - 5.1 mmol/L Chloride 121 (H) 97 - 108 mmol/L  
 CO2 22 21 - 32 mmol/L Anion gap 5 5 - 15 mmol/L Glucose 83 65 - 100 mg/dL BUN 24 (H) 6 - 20 MG/DL Creatinine 1.48 (H) 0.70 - 1.30 MG/DL  
 BUN/Creatinine ratio 16 12 - 20 GFR est AA 56 (L) >60 ml/min/1.73m2 GFR est non-AA 46 (L) >60 ml/min/1.73m2 Calcium 6.7 (L) 8.5 - 10.1 MG/DL Bilirubin, total 0.8 0.2 - 1.0 MG/DL  
 ALT (SGPT) <6 (L) 12 - 78 U/L  
 AST (SGOT) 21 15 - 37 U/L Alk. phosphatase 70 45 - 117 U/L Protein, total 4.9 (L) 6.4 - 8.2 g/dL Albumin 1.8 (L) 3.5 - 5.0 g/dL Globulin 3.1 2.0 - 4.0 g/dL  A-G Ratio 0.6 (L) 1.1 - 2.2 MAGNESIUM Collection Time: 05/24/21  4:11 PM  
Result Value Ref Range Magnesium 1.7 1.6 - 2.4 mg/dL TROPONIN I Collection Time: 05/24/21  4:11 PM  
Result Value Ref Range Troponin-I, Qt. 0.05 (H) <0.05 ng/mL URINALYSIS W/ RFLX MICROSCOPIC Collection Time: 05/24/21  4:59 PM  
Result Value Ref Range Color DARK YELLOW Appearance CLOUDY (A) CLEAR Specific gravity 1.023 1.003 - 1.030    
 pH (UA) 5.5 5.0 - 8.0 Protein 100 (A) NEG mg/dL Glucose Negative NEG mg/dL Ketone TRACE (A) NEG mg/dL Blood SMALL (A) NEG Urobilinogen 2.0 (H) 0.2 - 1.0 EU/dL Nitrites Negative NEG Leukocyte Esterase Negative NEG    
 WBC 0-4 0 - 4 /hpf  
 RBC 0-5 0 - 5 /hpf Epithelial cells FEW FEW /lpf Bacteria Negative NEG /hpf Amorphous Crystals 1+ (A) NEG Hyaline cast 0-2 0 - 5 /lpf Granular cast 2-5 (A) NEG /lpf  
BILIRUBIN, CONFIRM Collection Time: 05/24/21  4:59 PM  
Result Value Ref Range Bilirubin UA, confirm Negative NEG Radiologic Studies -  
XR CHEST PORT Final Result 1. Right-sided calcified pleural plaques. Superimposed mild heterogeneous  
opacities in the right lower lung, as well as in the left mid and lower lungs,  
which may represent edema, atelectasis and/or infection. CT HEAD WO CONT Final Result 1. No evidence of acute intracranial abnormality. 2. Unchanged extensive encephalomalacia in the right posterior cerebral  
hemisphere and bilateral cerebellum. CT Results  (Last 48 hours) 05/24/21 1644  CT HEAD WO CONT Final result Impression:  1. No evidence of acute intracranial abnormality. 2. Unchanged extensive encephalomalacia in the right posterior cerebral  
hemisphere and bilateral cerebellum. Narrative:  EXAM:  CT HEAD WO CONT INDICATION:   altered mentation COMPARISON: CT head 5/9/2019. TECHNIQUE: Unenhanced CT of the head was performed using 5 mm images.  Brain and bone windows were generated. CT dose reduction was achieved through use of a  
standardized protocol tailored for this examination and automatic exposure  
control for dose modulation. FINDINGS:  
The ventricles are normal in size and position. Unchanged extensive  
encephalomalacia throughout the right parieto-occipital lobe, and bilateral  
cerebellum. Multiple other chronic infarct in the bilateral cerebral white  
matter are also unchanged. Basilar cisterns are patent. No midline shift. There  
is no evidence of acute infarct, hemorrhage, or extraaxial fluid collection. The paranasal sinuses, mastoid air cells, and middle ears are clear. The orbital  
contents are within normal limits with bilateral lens implants. There are no  
significant osseous or extracranial soft tissue lesions. CXR Results  (Last 48 hours) 05/24/21 1710  XR CHEST PORT Final result Impression:  1. Right-sided calcified pleural plaques. Superimposed mild heterogeneous  
opacities in the right lower lung, as well as in the left mid and lower lungs,  
which may represent edema, atelectasis and/or infection. Narrative:  EXAM:  XR CHEST PORT INDICATION:   AMS  
   
COMPARISON: Chest radiograph 3/25/2021. FINDINGS: AP radiograph of the chest was obtained. Right-sided calcified pleural plaques again noted. There are superimposed mild  
heterogeneous opacities noted in the right lower lung and in the left mid and  
lower lungs. There is no significant pleural effusion. No pneumothorax. Heart  
size is within normal limits. No acute osseous abnormality. Medical Decision Making I reviewed the vital signs, available nursing notes, past medical history, past surgical history, family history and social history. Vital Signs-I have reviewed the vital signs that have been made available during the patient's emergency department visit.   The vital signs auto-populated below are obtained mostly by electronic means through monitoring devices that have been downloaded into the patient's chart by the nursing staff. Some vital signs are not downloaded into the chart until after the patient has been discharged and this note has been completed, therefore some vital signs may not be available to the physician for review prior to patient's discharge or admission. The physician has reviewed the patient's triage vital signs, monitored the electronic monitoring devices remotely for any significant vital sign abnormalities, and have reviewed vital signs prior to discharge. Some vital signs reviewed at bedside or remotely utilizing electronic monitoring devices may be different than the vital signs downloaded into the electronic medical record. Some vital signs may be erroneous and inaccurate since they are obtained by electronic monitoring devices, and not all vital signs are verified for accuracy by nursing staff prior to downloading into the patient's chart. Patient Vitals for the past 24 hrs: 
 Temp Pulse Resp BP SpO2  
05/24/21 1730  83 11 134/61 98 % 05/24/21 1715  81 14 (!) 121/53 100 % 05/24/21 1700  85 13 (!) 126/52 99 % 05/24/21 1615  87 20 (!) 109/55 100 % 05/24/21 1605  92 18 119/67   
05/24/21 1555 98 °F (36.7 °C) 95 22 (!) 124/59 97 % 05/24/21 1546    (!) 123/59  Records Reviewed: Nursing notes for today's visit have been reviewed. I have also reviewed most recent medical records pertinent to today's complaints, if available in our medical record system. I have also reviewed all labs and imaging results from previous results in comparison to results obtained today. If an EKG was obtained today, it has been compared to previous EKGs, if available. If arriving via EMS, the EMS report has been reviewed if made available to us within the patient's time in the emergency department.  
 
Provider Notes (Medical Decision Making): Patient presents with an acute encephalopathy. Patient has not been eating or drinking well. I do suspect some degree of dehydration. Will check labs for metabolic abnormalities and electrolyte disturbances. He does have low normal magnesium and potassium levels. Will optimize these levels by providing IV potassium and magnesium replacement. He has been showing several runs of sustained ventricular tachycardia. He does maintain pulse. Amiodarone bolus and maintenance drip was started which appears to be helping control the V. tach. He has not been febrile. Normal white blood cell count. No signs of infection. No renal failure. Discussed case with cardiology and admitting to the hospital service. No pneumonia noted on chest x-ray. He does have pretty extensive encephalomalacia noticed on his CT head. ED Course:  
Initial assessment performed. The patients presenting problems have been discussed, and they are in agreement with the care plan formulated and outlined with them. I have encouraged them to ask questions as they arise throughout their visit. Orders Placed This Encounter  XR CHEST PORT  
 CT HEAD WO CONT  CBC WITH AUTOMATED DIFF  
 COMPREHENSIVE METABOLIC PANEL  
 MAGNESIUM  
 URINALYSIS W/ RFLX MICROSCOPIC  TROPONIN I  
 BILIRUBIN, CONFIRM  *UA&MICRO CHARGE BAT  DIET NPO  
 NOTIFY PROVIDER: SPECIFY Notify provider on pt's arrival to floor ONE TIME STAT  
 OUT OF BED WITH ASSISTANCE  
 VITAL SIGNS PER UNIT ROUTINE  
 EKG NOTEWRITER(ASAP ONLY)  EKG NOTEWRITER(ASAP ONLY)  FULL CODE  EKG, 12 LEAD, INITIAL  
 SALINE LOCK IV ONE TIME STAT  
 FOLLOWED BY Linked Order Group  amiodarone (CORDARONE) 375 mg in dextrose 5% 250 mL infusion  amiodarone (CORDARONE) 375 mg in dextrose 5% 250 mL infusion  DISCONTD: amiodarone (CORDARONE) 150 mg in dextrose 5% 100 mL bolus infusion  
 0.9% sodium chloride infusion  amiodarone (NEXTERONE) 150 mg in dextrose 5% 100 ml bolus premix 150 mg  
 0.9% sodium chloride infusion  metoclopramide HCl (REGLAN) injection 5 mg  magnesium sulfate 2 g/50 ml IVPB (premix or compounded)  potassium chloride 10 mEq in 100 ml IVPB  IP CONSULT TO HOSPITALIST  IP CONSULT TO CARDIOLOGY  
 
 
EKG Date/Time: 5/24/2021 4:00 PM 
Performed by: Taylor Conde MD 
Authorized by: Taylor Conde MD  
 
ECG reviewed by ED Physician in the absence of a cardiologist: yes Rate:  
  ECG rate:  168 ECG rate assessment: tachycardic Rhythm:  
  Rhythm: ventricular tachycardia QRS:  
  QRS intervals: Wide ST segments: ST segments:  Abnormal 
T waves:  
  T waves: non-specific EKG Date/Time: 5/24/2021 4:05 PM 
Performed by: Taylor Conde MD 
Authorized by: Taylor Conde MD  
 
ECG reviewed by ED Physician in the absence of a cardiologist: yes Rate:  
  ECG rate:  87 ECG rate assessment: normal   
Rhythm:  
  Rhythm: atrial fibrillation Ectopy:  
  Ectopy: none QRS:  
  QRS axis:  Normal 
Conduction:  
  Conduction: normal   
ST segments: ST segments:  Non-specific T waves:  
  T waves: non-specific Critical Care Time:  
I have spent 45 minutes of critical care time in evaluating and treating this patient. This includes time spent at bedside, time with family and decision makers, documentation, review of labs and imaging, and/or consultation with specialists. It does not include time spent on separately billed procedures. This patient presents with a critical illness or injury that acutely impairs one or more vital organ systems such that there is a high probability of imminent or life threatening deterioration in the patient's condition. This case involved decision making of high complexity to assess, manipulate, and support vital organ system failure and/or to prevent further life threatening deterioration of the patient's condition.  Failure to initiate these interventions on an urgent basis would likely result in sudden, clinically significant or life threatening deterioration in the patient's condition. Abnormal findings supporting critical care: Ventricular tachycardia with pulse Interventions to support critical care: Amiodarone bolus and infusion Failure to intervene may result in: Sustained ventricular tachycardia and loss of pulse resulting in cardiac arrest 
 
 
Disposition: 
Admit Diagnosis Clinical Impression: 1. Acute encephalopathy 2. Ventricular tachycardia ([address]

## 2021-05-24 NOTE — ED NOTES
Pt arrived via EMS from home. Pt lives at home with significant other, EMS crew states no other family involvement. [facility] called EMS during their visit when pt did not appear to be doing as well as he was during their last visit 5 days ago on Wednesday. Per EMS, pt in afib and having frequent runs of vtach. Upon presentation to ED, pt still with frequent runs of vtach. Dr. Romario Bedolla to bedside for orders. Pt placed on defib pads with bedside monitor.

## 2021-05-24 NOTE — H&P
. 
               
Hospitalist Admission Note NAME: Kvng Hill :  1940 MRN:  [id number] Date/Time:  2021 7:59 PM 
 
Patient PCP: Jennifer Cadet, DO 
______________________________________________________________________ Given the patient's current clinical presentation, I have a high level of concern for decompensation if discharged from the emergency department. Complex decision making was performed, which includes reviewing the patient's available past medical records, laboratory results, and x-ray films. My assessment of this patient's clinical condition and my plan of care is as follows. Assessment / Plan: 
Recurrent wide complex tachycardia episodes New atrial fibrillation Hypomagnesemia 1.7 We will admit to stepdown unit We will continue with amiodarone already had been loaded with 150 mg bolus we will continue with infusion as recommended by cardiology Echocardiogram 
Receiving already 2 g of magnesium in the ED, we will add 2 more grams and will recheck level in the morning Check TSH Concern for CAP vs aspiration pneumonitis Dehydration Concern for sepsis  (band forms 9%, tachycardia, tachypnea, worsening cognition, initially per nursing staff was responding to pain) CT brain Unchanged extensive encephalomalacia in the right posterior cerebral 
hemisphere and bilateral cerebellum. 
  
CXR 1. Right-sided calcified pleural plaques. Superimposed mild heterogeneous 
opacities in the right lower lung, as well as in the left mid and lower lungs, 
which may represent edema, atelectasis and/or infection. Urinalysis appears bland Collect blood culture, check lactate level We will start him on empiric therapy with ceftriaxone and doxycycline check procalcitonin and pro-BNP strict I/O charting monitor for urinary retention 
 
palliative care consultation Physical deconditioning Recent left hip fracture Parkinson's disease PT and OT evaluation Case management to assist with discharge Continue Sinemet 
 
  
History of multiple CVAs HLD Can not rule new insult (deteriorating functionality/swallowing and speech) Continue aspirin and statin We will consult neurology will preform brain MRI hold anti-hypertensive meds 
 
 
  
Hypernatremia Dehydration CKD stage III Appears stable Will provide 1 liter of 0.225 sodium chloride. Spoke with with Ms. Ivonne Bumpers his life partner and listed as emergency contact on the file. She expressed having a directive previously naming her as POA she is not able to locate. Addendum: Ms. Fernandez Camargo called back to express they share their estate but does not have a directive naming her as a surrogate decision maker Addendum 21:50 patient went into sustained Vtach, hemodynamics are stable, patient denied symptoms. Given 150 mg of amiodarone, went back into controlled rate atrial fibrillation and within a minute he went again into Vtach. Given 50 mg of lidocaine. Now back to a fib with controlled rate Will recheck electrolyte panel Addendum 22:44 Continues to have episodes of Vtach lasting around 20 seconds. Communicated with Dr. Maria Ines Tyson for further recommendations, advised to add metoprolol and d/c shock if needed. To consult palliative/hospice medicine. Code Status: full code Surrogate Decision Maker: to be decided DVT Prophylaxis: enoxaparin GI Prophylaxis: not indicated Baseline: bed bound for the past month Subjective: CHIEF COMPLAINT: deconditioning HISTORY OF PRESENT ILLNESS:    
Pablo Lauren is a 79 y.o.  male respiratory distress significant for multiple strokes, bilateral weakness, Parkinson's disease, dementia, hypertension, hyperlipidemia, recent left hip fracture. Was brought into the ED as advised by his home health staff as he has not been appearing well for the last few days.  
 
Spoke with his significant other over the phone who has expressed that he had been very hot to the touch the last 2 days, concerns that his medications might have been causing him to be drowsy. Otherwise she had a concerns about poor oral intake and loose bowel movements 2 or 3/day for the last 2 or 3 days. She also expressed that his speech has been mumbled for months. As well as having poor communication and reaction to her surrounding in the past few months and has been bedbound since his fall and hip fracture in March. On the road and in the ED was found to have go through multiple rounds as long as over 30 seconds of wide-complex tachycardia. Cardiology consulted. Was started on amiodarone and has been started on magnesium replacement as well. No further tachycardia episodes have been observed. Her medications: 
 
Amlodipine 10 mg daily Aspirin 81 mg daily Baclofen 5 mg twice a day Calcium supplement 600 mg Carbidopa/Levodopa 25 2 tab tid Citalopram 10 mg Famotidine daily Melatonin 3 mg qhs 
Metoprolol succinate 25 mg 0.5 tab daily Pravastatin 40 mg qhs We were asked to admit for work up and evaluation of the above problems. Past Medical History:  
Diagnosis Date  CAD (coronary artery disease)  High cholesterol  Hypertension  Parkinson disease ([facility] [address]  Stroke [facility]) 2006  
 left sided weakness Past Surgical History:  
Procedure Laterality Date  HX CATARACT REMOVAL Bilateral   
 HX HEART CATHETERIZATION  2006  [place] COLONOSCOPY FLX DX W/COLLJ SPEC WHEN PFRMD  5/12/2014 Social History Tobacco Use  Smoking status: Former Smoker  Smokeless tobacco: Never Used Substance Use Topics  Alcohol use: No  
  
 
No family history on file. Patient is unable to express meaningful history No Known Allergies Prior to Admission medications Medication Sig Start Date End Date Taking? Authorizing Provider  
citalopram (CeleXA) 10 mg tablet Take 1 Tab by mouth every evening.  3/30/21 Scarlet Clarke MD  
carbidopa-levodopa (SINEMET)  mg per tablet Take 1 Tab by mouth three (3) times daily. 5/10/19   Romelia Ibarra MD  
metoprolol succinate (TOPROL-XL) 25 mg XL tablet Take 1 Tab by mouth daily. 5/11/19   Romelia Ibarra MD  
pravastatin (PRAVACHOL) 80 mg tablet Take 1 Tab by mouth daily. 5/10/19   Romelia Ibarra MD  
polyethylene glycol (MIRALAX) 17 gram/dose powder Take 17 g by mouth daily as needed (constipation). Provider, Historical  
calcium-cholecalciferol, D3, (CALTRATE 600+D) tablet Take 1 Tab by mouth daily. Provider, Historical  
raNITIdine (ZANTAC) 150 mg tablet Take 150 mg by mouth daily. Other, MD Sima  
amLODIPine (NORVASC) 10 mg tablet Take 10 mg by mouth daily. Provider, Historical  
aspirin 81 mg chewable tablet Take 81 mg by mouth daily. Provider, Historical  
 
 
REVIEW OF SYSTEMS:    
I am not able to complete the review of systems because: The patient is intubated and sedated The patient has altered mental status due to his acute medical problems The patient has baseline aphasia from prior stroke(s) X The patient has baseline dementia and is not reliable historian The patient is in acute medical distress and unable to provide information Total of 12 systems reviewed as follows:   
   POSITIVE= underlined text  Negative = text not underlined General:  fever, chills, sweats, generalized weakness, weight loss/gain,  
   loss of appetite Eyes:    blurred vision, eye pain, loss of vision, double vision ENT:    rhinorrhea, pharyngitis Respiratory:   cough, sputum production, SOB, MARTINEZ, wheezing, pleuritic pain  
Cardiology:   chest pain, palpitations, orthopnea, PND, edema, syncope Gastrointestinal:  abdominal pain , N/V, diarrhea, dysphagia, constipation, bleeding Genitourinary:  frequency, urgency, dysuria, hematuria, incontinence Muskuloskeletal :  arthralgia, myalgia, back pain Hematology:  easy bruising, nose or gum bleeding, lymphadenopathy Dermatological: rash, ulceration, pruritis, color change / jaundice Endocrine:   hot flashes or polydipsia Neurological:  headache, dizziness, confusion, focal weakness, paresthesia, Speech difficulties, memory loss, gait difficulty Psychological: Feelings of anxiety, depression, agitation Objective: VITALS:   
Visit Vitals /75 Pulse 84 Temp 98 °F (36.7 °C) Resp 15 Ht 6' 3\" (1.905 m) Wt 77 kg (169 lb 12.1 oz) SpO2 98% BMI 21.22 kg/m² PHYSICAL EXAM: 
 
General:    Drowsy, poorly reactive, able to obeys simple commands, chronically ill-appearing, appears underweight, appears stated age. HEENT: Atraumatic, anicteric sclerae, pink conjunctivae No oral ulcers, dry mucous membranes, throat clear, poor dentition Neck:  Supple, symmetrical,  thyroid: non tender Lungs:   Clear to auscultation bilaterally. No Wheezing or Rhonchi. No rales. Chest wall:  No tenderness  No Accessory muscle use. Heart:   Irregularly irregular,  No  murmur   No edema Abdomen:   Soft, non-tender. Not distended. Bowel sounds normal 
Extremities: No cyanosis. No clubbing,   
  Skin turgor normal, Capillary refill normal, Radial dial pulse 2+ Skin:     Not pale. Not Jaundiced  No rashes Psych:  Unable to assess Neurologic: EOMs intact. Contractures in all 4 limbs more severe in the left side, increased tone and deep tendon reflexes in all 4 limbs, able to ambulate upper limbs against gravity. Oriented to place (hospital), year and month 
 
_______________________________________________________________________ Care Plan discussed with: 
  Comments Patient Family  x   
RN x Care Manager Consultant:  vivien CHAU attending  
_______________________________________________________________________ Expected  Disposition:  
Home with Family x HH/PT/OT/RN   
SNF/LTC   
[facility] ________________________________________________________________________ TOTAL TIME:  61 Minutes Critical Care Provided 32    Minutes non procedure based Comments  
 x Reviewed previous records  
>50% of visit spent in counseling and coordination of care x Discussion with patient and/or family and questions answered 
  
 
________________________________________________________________________ Signed: Unique Rodrigues MD 
 
Procedures: see electronic medical records for all procedures/Xrays and details which were not copied into this note but were reviewed prior to creation of Plan. LAB DATA REVIEWED:   
Recent Results (from the past 24 hour(s)) CBC WITH AUTOMATED DIFF Collection Time: 05/24/21  4:11 PM  
Result Value Ref Range WBC 8.0 4.1 - 11.1 K/uL  
 RBC 3.49 (L) 4.10 - 5.70 M/uL  
 HGB 10.6 (L) 12.1 - 17.0 g/dL HCT 33.0 (L) 36.6 - 50.3 % MCV 94.6 80.0 - 99.0 FL  
 MCH 30.4 26.0 - 34.0 PG  
 MCHC 32.1 30.0 - 36.5 g/dL  
 RDW 13.3 11.5 - 14.5 % PLATELET 856 596 - 678 K/uL MPV 10.3 8.9 - 12.9 FL  
 NRBC 0.0 0  WBC ABSOLUTE NRBC 0.00 0.00 - 0.01 K/uL NEUTROPHILS 77 (H) 32 - 75 % BAND NEUTROPHILS 9 % LYMPHOCYTES 6 (L) 12 - 49 % MONOCYTES 6 5 - 13 % EOSINOPHILS 0 0 - 7 % BASOPHILS 1 0 - 1 % METAMYELOCYTES 1 % IMMATURE GRANULOCYTES 0 0.0 - 0.5 % ABS. NEUTROPHILS 6.9 1.8 - 8.0 K/UL  
 ABS. LYMPHOCYTES 0.5 (L) 0.8 - 3.5 K/UL  
 ABS. MONOCYTES 0.5 0.0 - 1.0 K/UL  
 ABS. EOSINOPHILS 0.0 0.0 - 0.4 K/UL  
 ABS. BASOPHILS 0.1 0.0 - 0.1 K/UL  
 ABS. IMM. GRANS. 0.0 0.00 - 0.04 K/UL  
 DF MANUAL    
 RBC COMMENTS NORMOCYTIC, NORMOCHROMIC METABOLIC PANEL, COMPREHENSIVE Collection Time: 05/24/21  4:11 PM  
Result Value Ref Range Sodium 148 (H) 136 - 145 mmol/L Potassium 3.7 3.5 - 5.1 mmol/L Chloride 121 (H) 97 - 108 mmol/L  
 CO2 22 21 - 32 mmol/L Anion gap 5 5 - 15 mmol/L Glucose 83 65 - 100 mg/dL  BUN 24 (H) 6 - 20 MG/DL  
 Creatinine 1.48 (H) 0.70 - 1.30 MG/DL  
 BUN/Creatinine ratio 16 12 - 20 GFR est AA 56 (L) >60 ml/min/1.73m2 GFR est non-AA 46 (L) >60 ml/min/1.73m2 Calcium 6.7 (L) 8.5 - 10.1 MG/DL Bilirubin, total 0.8 0.2 - 1.0 MG/DL  
 ALT (SGPT) <6 (L) 12 - 78 U/L  
 AST (SGOT) 21 15 - 37 U/L Alk. phosphatase 70 45 - 117 U/L Protein, total 4.9 (L) 6.4 - 8.2 g/dL Albumin 1.8 (L) 3.5 - 5.0 g/dL Globulin 3.1 2.0 - 4.0 g/dL A-G Ratio 0.6 (L) 1.1 - 2.2 MAGNESIUM Collection Time: 05/24/21  4:11 PM  
Result Value Ref Range Magnesium 1.7 1.6 - 2.4 mg/dL TROPONIN I Collection Time: 05/24/21  4:11 PM  
Result Value Ref Range Troponin-I, Qt. 0.05 (H) <0.05 ng/mL URINALYSIS W/ RFLX MICROSCOPIC Collection Time: 05/24/21  4:59 PM  
Result Value Ref Range Color DARK YELLOW Appearance CLOUDY (A) CLEAR Specific gravity 1.023 1.003 - 1.030    
 pH (UA) 5.5 5.0 - 8.0 Protein 100 (A) NEG mg/dL Glucose Negative NEG mg/dL Ketone TRACE (A) NEG mg/dL Blood SMALL (A) NEG Urobilinogen 2.0 (H) 0.2 - 1.0 EU/dL Nitrites Negative NEG Leukocyte Esterase Negative NEG    
 WBC 0-4 0 - 4 /hpf  
 RBC 0-5 0 - 5 /hpf Epithelial cells FEW FEW /lpf Bacteria Negative NEG /hpf Amorphous Crystals 1+ (A) NEG Hyaline cast 0-2 0 - 5 /lpf Granular cast 2-5 (A) NEG /lpf  
BILIRUBIN, CONFIRM Collection Time: 05/24/21  4:59 PM  
Result Value Ref Range  Bilirubin UA, confirm Negative NEG

## 2021-05-24 NOTE — ED NOTES
Gave pt girlfriend Nickkamar Bhavesh an update; she advised they are not legally  but have been together for >40 years. She advised Mr Alessio Thompson has a son and a daughter but was not able to locate their phone numbers while I was on the phone with her.

## 2021-05-24 NOTE — ED NOTES
Received report from [address] [place] [address]. [phone]. Advised of concerns of pt if they were to be discharged back home after ED visit. Advised that pt has only had 1/2 cup fluids in the past few days. Hx of parkinsons, vascular dementia, CVA, CAD. Max assist. L femur fx and L hip fx. Advised only contact they have on file is Tyrel Hackett. ED RN Tadeo Meléndez updated demographics section with Ms Duncan's preferred phone number that Mar SANTIAGO stated was the preferred number. Ms Erik Amin has not been contacted by Tadeo Meléndez RN at this time.

## 2021-05-25 NOTE — CONSULTS
[id number] Formerly [facility], [facility] Medicine Consult Phiilp: [phone] ([phone]) Patient Name: Kory Galindo YOB: 1940 Date of Initial Consult: 5/25/21 Reason for Consult: care decisions Requesting Provider: Jasmeet Rodriguez MD 
Primary Care Physician: Viviana Bustos DO 
 
 SUMMARY:  
Kory Galindo is a 79 y.o. male with a past history of multiple strokes, Parkinson disease, dementia, hypertension recent left hip fracture stay at [facility] [address] from 3/25/2021 to 3/30/2021 s/p IM nailing. He presented to the ER for deconditioning, poor oral intake and loose bowel movement for 2 to 3 days, slurred speech and dysphagia . He was admitted on 5/24/2021 from from home with recurrent episode of wide-complex  tachycardia new atrial fibrillation hypomagnesemia dehydration and concern for sepsis community-acquired pneumonia versus aspiration and worsening cognitionencephalopathy. Work-up  CT scan of head does not show any acute intracranial abnormality, unchanged extensive Eencephalomalacia in the right posterior cerebral hemisphere and bilateral cerebellum. Per neuro dysphagia and slowing of the movement is more likely due to progression of his Parkinson disease however MRI without contrast is suggested to rule out stroke, based on family goals . Currently he is n.p.o. per speech. Cardiology following, currently he is on amiodarone drip, they recommend hospice, and suggested suggested palliative care involvement to discuss goals of care. Social history: At baseline he is debilitated, now bedbound since his recent left hip fracture in March of this year, recently discharged from rehab was on home health. He is single has two children son and a daughter daughter is in touch with him, son is estranged. His life partner for 40 years Ms. Elsi Isbell is his main support and caregiver. PALLIATIVE DIAGNOSES:  
1. Debility due to multiple stroke 2. Cognitive impairment/dementia due to multiple stroke 3. Feeding difficulty 4. Aspiration pneumonia 5. Goals of care 6. DNR discussion. PLAN:  
1. Chart reviewed, case discussed with Dr. Khadijah Payan prior to my visit. 2. Met with patient and his partner for over 36 years Ms. Daniela Nguyen along with palliative care  Harshad Lei, patient is currently alert, oriented x2 able to answer appropriately, mentally processing slow though however can make simple decisions,  he is not able to comprehend the complexity of his medical issues not bale to tell us medical issues leading to hospitalization. 3. Patient wants her significant other/patner  and Ms Katelyn Cavazos to be his voice/medical POA, he completed the medical POA document by  putting a wanda unable to sign because of weakness due to stroke(refer to ACP note from palliative care team  Harshad Lei ). 4. We talked about overall complexity of his medical issues with increasing debility, we discuss focus on comfot, recommended to protect him from the from CPR, they have a lot to process, we decided to follow-up tomorrow to continue with goals of care discussion and support patient and family as they go through difficult course of his illness. Patient did tell us he would like to stay home. Briefly educated on hospice philosophy. 5. Ms. Mackenzie Koehler will call us tomorrow when she is at bedside. Morena Werner 6. Initial consult note routed to primary continuity provider and/or primary health care team members 7. Communicated plan of care with: Palliative Mary BARAKAT 192 Team 
 
 GOALS OF CARE / TREATMENT PREFERENCES:  
 
GOALS OF CARE: 
Patient/Health Care Proxy Stated Goals:  (on going discussion) TREATMENT PREFERENCES:  
Code Status: Full Code Advance Care Planning: 
[x] The [facility] [place], [place] Interdisciplinary Team has updated the ACP Navigator with [address] and Patient Capacity Primary Decision Maker (Active): Graeme Partner - [phone] Advance Care Planning 5/5/2019 Confirm Advance Directive None Medical Interventions:  (on going discussion) Other Instructions: Other: As far as possible, the palliative care team has discussed with patient / health care proxy about goals of care / treatment preferences for patient. HISTORY:  
 
History obtained from: Chart, partner, bed and bedside nurse CHIEF COMPLAINT: Pain in left hip HPI/SUBJECTIVE: The patient is:  
[] Verbal and participatory Patient is alert, answering appropriately though processing slowly. He is complaining of constant dull ache in his left hip since his hip fracture recently in March. His his partner notes patient had been bedbound after coming from rehab since his right hip fracture in March, previously he also had difficulty walking he was \"stumbling\" Patient denies any nausea, vomiting wife notes that he had pain eating poorly of note he eats a soft diet at home, lately lately he was weak getting weak and was found to be confused that prompted visit to the emergency room. His partner notes that today he is looking better almost back to his baseline mental and physical function. Clinical Pain Assessment (nonverbal scale for severity on nonverbal patients):  
Clinical Pain Assessment Severity: 2 Location: left hip Character: dull Lela Hope Duration: unable to tell me Effect: unable to tell me Factors: unable to tell me Frequency: uanble to tell me Activity (Movement): Lying quietly, normal position Duration: for how long has pt been experiencing pain (e.g., 2 days, 1 month, years) Frequency: how often pain is an issue (e.g., several times per day, once every few days, constant) FUNCTIONAL ASSESSMENT:  
 
Palliative Performance Scale (PPS): PPS: 40  PSYCHOSOCIAL/SPIRITUAL SCREENING:  
 
Palliative IDT has assessed this patient for cultural preferences / practices and a referral made as appropriate to needs (Cultural Services, Patient Advocacy, Ethics, etc.) Any spiritual / Druze concerns: 
[] Yes /  [x] No 
 
Caregiver Burnout: 
[] Yes /  [x] No /  [] No Caregiver Present Anticipatory grief assessment:  
[x] Normal  / [] Maladaptive ESAS Anxiety: Anxiety: 0 
 
ESAS Depression:    
 
 
 REVIEW OF SYSTEMS:  
 
Positive and pertinent negative findings in ROS are noted above in HPI. The following systems were [x] reviewed / [] unable to be reviewed as noted in HPI Other findings are noted below. Systems: constitutional, ears/nose/mouth/throat, respiratory, gastrointestinal, genitourinary, musculoskeletal, integumentary, neurologic, psychiatric, endocrine. Positive findings noted below. Modified ESAS Completed by: provider Fatigue: 8 Drowsiness: 0 Pain: 2 Anxiety: 0 Nausea: 0 Anorexia: 6 Dyspnea: 0 Constipation: No  
     
 
 
 PHYSICAL EXAM:  
 
From RN flowsheet: 
Wt Readings from Last 3 Encounters:  
05/24/21 169 lb 12.1 oz (77 kg) 03/30/21 169 lb 1.6 oz (76.7 kg) 06/18/19 168 lb (76.2 kg) Blood pressure 112/70, pulse 71, temperature 98.1 °F (36.7 °C), resp. rate 14, height 6' 3\" (1.905 m), weight 169 lb 12.1 oz (77 kg), SpO2 95 %. Pain Scale 1: Visual 
Pain Intensity 1: 0 Last bowel movement, if known:  
 
Constitutional: Debilitated, alert oriented x2, processing slow, feeble voice  
eyes: pupils equal, anicteric ENMT: no nasal discharge, moist mucous membranes Cardiovascular: regular rhythm, no edema Respiratory: breathing not labored, symmetric Gastrointestinal: soft non-tender, +bowel sounds Musculoskeletal: Some muscular wasting , no deformity, no tenderness to palpation Skin: warm, dry Neurologic: following commands, moving all extremities Psychiatric: Flat effect Other: 
 
 
 HISTORY:  
 
Active Problems: 
  Wide-complex tachycardia ([address] (5/24/2021) Past Medical History:  
Diagnosis Date  CAD (coronary artery disease)  High cholesterol  Hypertension  [facility] [address]  Stroke [facility]) 2006  
 left sided weakness Past Surgical History:  
Procedure Laterality Date  HX CATARACT REMOVAL Bilateral   
 HX HEART CATHETERIZATION  2006  AL COLONOSCOPY FLX DX W/COLLJ SPEC WHEN PFRMD  5/12/2014 No family history on file. History reviewed, no pertinent family history. Social History Tobacco Use  Smoking status: Former Smoker  Smokeless tobacco: Never Used Substance Use Topics  Alcohol use: No  
 
No Known Allergies Current Facility-Administered Medications Medication Dose Route Frequency  0.9% sodium chloride infusion  75 mL/hr IntraVENous CONTINUOUS  
 amiodarone (CORDARONE) 375 mg in dextrose 5% 250 mL infusion  0.5 mg/min IntraVENous CONTINUOUS  
 sodium chloride (NS) flush 5-40 mL  5-40 mL IntraVENous Q8H  
 sodium chloride (NS) flush 5-40 mL  5-40 mL IntraVENous PRN  
 acetaminophen (TYLENOL) tablet 650 mg  650 mg Oral Q6H PRN Or  
 acetaminophen (TYLENOL) suppository 650 mg  650 mg Rectal Q6H PRN  polyethylene glycol (MIRALAX) packet 17 g  17 g Oral DAILY PRN  
 carbidopa-levodopa (SINEMET)  mg per tablet 1 Tablet  1 Tablet Oral TID  pravastatin (PRAVACHOL) tablet 80 mg  80 mg Oral DAILY  cefTRIAXone (ROCEPHIN) 2 g in 0.9% sodium chloride (MBP/ADV) 50 mL MBP  2 g IntraVENous Q24H  
 sodium chloride (NS) flush 5-10 mL  5-10 mL IntraVENous PRN  
 aspirin chewable tablet 81 mg  81 mg Oral DAILY  enoxaparin (LOVENOX) injection 40 mg  40 mg SubCUTAneous Q24H  
 doxycycline (VIBRAMYCIN) 100 mg in 0.9% sodium chloride (MBP/ADV) 100 mL MBP  100 mg IntraVENous Q12H  
 metoprolol (LOPRESSOR) injection 2.5 mg  2.5 mg IntraVENous Q4H  
 
 
 
 LAB AND IMAGING FINDINGS:  
 
Lab Results Component Value Date/Time WBC 8.0 05/24/2021 04:11 PM  
 HGB 10.6 (L) 05/24/2021 04:11 PM  
 PLATELET 997 20/87/4362 04:11 PM  
 
Lab Results Component Value Date/Time  Sodium 139 05/25/2021 12:23 AM  
 Potassium 4.2 05/25/2021 12:23 AM  
 Chloride 106 05/25/2021 12:23 AM  
 CO2 26 05/25/2021 12:23 AM  
 BUN 30 (H) 05/25/2021 12:23 AM  
 Creatinine 1.81 (H) 05/25/2021 12:23 AM  
 Calcium 9.0 05/25/2021 12:23 AM  
 Magnesium 3.4 (H) 05/25/2021 12:23 AM  
  
Lab Results Component Value Date/Time Alk. phosphatase 70 05/24/2021 04:11 PM  
 Protein, total 4.9 (L) 05/24/2021 04:11 PM  
 Albumin 1.8 (L) 05/24/2021 04:11 PM  
 Globulin 3.1 05/24/2021 04:11 PM  
 
Lab Results Component Value Date/Time INR 1.0 03/25/2021 03:14 PM  
 Prothrombin time 10.9 03/25/2021 03:14 PM  
  
No results found for: IRON, FE, TIBC, IBCT, PSAT, FERR No results found for: PH, PCO2, PO2 No components found for: [place] Lab Results Component Value Date/Time CK 99 05/07/2019 03:40 AM  
 CK - MB 1.8 12/30/2017 04:38 PM  
  
 
 
   
 
Total time: 70 mins Counseling / coordination time, spent as noted above: 45 mins 
> 50% counseling / coordination?: yes Prolonged service was provided for  []30 min   []75 min in face to face time in the presence of the patient, spent as noted above. Time Start:  
Time End:  
Note: this can only be billed with 96664 (initial) or 74210 (follow up). If multiple start / stop times, list each separately.

## 2021-05-25 NOTE — PROGRESS NOTES
Problem: Dysphagia (Adult) Goal: *Acute Goals and Plan of Care (Insert Text) Description: Speech Therapy Goals Initiated 5/25/2021 1. Patient will tolerate medication crushed in puree without overt s/s aspiration within 7 days. 2.  Patient will participate in re-evaluation of swallow function with liquids and solids within 7 days. Outcome: Progressing Towards Goal 
  
SPEECH LANGUAGE PATHOLOGY BEDSIDE SWALLOW EVALUATION Patient: Misty Vaughn (18 y.o. male) Date: 5/25/2021 Primary Diagnosis: Wide-complex tachycardia ([address] [I47.2] Precautions: Aspiration ASSESSMENT : 
Based on the objective data described below, the patient presents with mild-moderate oral and moderate pharyngea dysphagia characterized by delayed bolus formation and transit, suspected pharyngeal swallow delay and reduced laryngeal elevation via palpation. Patient eating in semi-upright position as he refused fully upright position due to pain in hip. Patient demonstrated multiple swallows, wet vocal quality and delayed throat clear after thin liquids and wet vocal quality after nectar thickened liquids. Patient tolerated purees without overt s/s aspiration. Risk of aspiration is elevated given dysphagia, generalized weakness and Parkinson's disease. Given bedside presentation this date feel safest course is medication crushed in puree. Patient will benefit from skilled intervention to address the above impairments. Patients rehabilitation potential is considered to be Fair PLAN : 
Recommendations and Planned Interventions: NPO except medication crushed in puree Oral care Frequency/Duration: Patient will be followed by speech-language pathology 3 times a week to address goals. Discharge Recommendations: To Be Determined SUBJECTIVE:  
Patient stated I feed myself. RN approved visit. OBJECTIVE:  
 
Past Medical History:  
Diagnosis Date CAD (coronary artery disease) High cholesterol Hypertension Parkinson disease ([address] Stroke [facility]) 2006  
 left sided weakness Past Surgical History:  
Procedure Laterality Date HX CATARACT REMOVAL Bilateral   
 HX HEART CATHETERIZATION  2006 HI COLONOSCOPY FLX DX W/COLLJ SPEC WHEN PFRMD  5/12/2014 Prior Level of Function/Home Situation:  
  
Diet prior to admission: Regular per patient report Current Diet:  NPO Cognitive and Communication Status: 
Neurologic State: Alert, Confused Orientation Level: Disoriented to place, Disoriented to situation, Oriented to person Cognition: Decreased attention/concentration, Decreased command following Oral Assessment: 
Oral Assessment Labial:  (Unable to assess given poor command following) Dentition: Extractions;Limited;Natural;Poor Oral Hygiene: Dried secretions in oral cavity. Oral care completed. P.O. Trials: 
Patient Position:  (Semi reclined in bed, unable to sit upright due to pain) Vocal quality prior to P.O.: Low volume Consistency Presented: Nectar thick liquid;Puree; Thin liquid How Presented: SLP-fed/presented;Cup/sip;Spoon;Straw Bolus Acceptance: No impairment Bolus Formation/Control: Impaired Type of Impairment: Delayed Propulsion: Delayed (# of seconds) Oral Residue: None Initiation of Swallow: Delayed (# of seconds) Laryngeal Elevation: Decreased Aspiration Signs/Symptoms: Change vocal quality;Clear throat;Delayed cough/throat clear Pharyngeal Phase Characteristics: Altered vocal quality; Double swallow Oral Phase Severity: Mild-moderate Pharyngeal Phase Severity : Moderate NOMS:  
The NOMS functional outcome measure was used to quantify this patient's level of swallowing impairment. Based on the NOMS, the patient was determined to be at level 2 for swallow function NOMS Swallowing Levels: 
Level 1 (CN): NPO Level 2 (CM): NPO but takes consistency in therapy Level 3 (CL): Takes less than 50% of nutrition p.o. and continues with nonoral feedings; and/or safe with mod cues; and/or max diet restriction Level 4 (CK): Safe swallow but needs mod cues; and/or mod diet restriction; and/or still requires some nonoral feeding/supplements Level 5 (CJ): Safe swallow with min diet restriction; and/or needs min cues Level 6 (CI): Independent with p.o.; rare cues; usually self cues; may need to avoid some foods or needs extra time Level 7 (CH): Independent for all p.o. MANNIE. (2003). National Outcomes Measurement System (NOMS): Adult Speech-Language Pathology User's Guide. After treatment:  
Patient left in no apparent distress in bed, Call bell within reach, and Nursing notified COMMUNICATION/EDUCATION:  
Patient was educated regarding role of SLP, medication in puree, swallow precautions. Patient stated \"okay. \" The patient's plan of care including recommendations, planned interventions, and recommended diet changes were discussed with: Registered nurse. Patient/family have participated as able in goal setting and plan of care. Patient/family agree to work toward stated goals and plan of care. Thank you for this referral. 
Colton Felder, SLP Time Calculation: 15 mins

## 2021-05-25 NOTE — ED NOTES
Pt with continuous runs of vtaquincy, Dr. Lillian Tellez to bedside. 150 mg amniodarone bolues IVP given per verbal order.

## 2021-05-25 NOTE — ED NOTES
Dr Tonny Linares stated that he would like to be notified of the following in regard to pts frequent long runs of Vtach:  
 
1) if it lasts greater than 30 seconds 2) if the pt becomes symptomatic 3) if the pt's pressure drops

## 2021-05-25 NOTE — CONSULTS
Consult NAME: Bonnie Romero :  1940 MRN:  [id number] Date/Time:  2021 8:15 AM 
 
Patient PCP: Nga Avilez, DO 
________________________________________________________________________ Assessment: 1. Paroxysmal atrial fibrillation 2. Wide complex tachycardia; likely NSVT 3. Hypomagnesemia; repleted 4. Hypokalemia; repleted 5. Chronic kidney disease; Stg 4 
6. Sepsis 7. Recent hip fx 8. Parkinsons 9. Remote CVAs 10. Hyperlipidemia Plan:  
CT: 1. No evidence of acute intracranial abnormality. 2. Unchanged extensive encephalomalacia in the right posterior cerebral 
hemisphere and bilateral cerebellum. CXR w/ ASDz Tele w/ PAF, SR, PVCs, NSVT 1. Echo is pending 2. Continue amiodarone gtt for now 3. Would not add lidocaine at this time 4. Continue IV metoprolol as needed 5. Keep K > 4 and Mg > 2 
6. Remainder of care per primary 7. Strongly suggest palliative care/hospice consideration Thank you for this consult and allowing me to take part in this patients care. Please call with questions. [x]        High complexity decision making was performed Subjective: CHIEF COMPLAINT: AMS HISTORY OF PRESENT ILLNESS:    
Ally Guerin is a 79 y.o.  male who \"Was brought into the ED as advised by his home health staff as he has not been appearing well for the last few days. 
  
Spoke with his significant other over the phone who has expressed that he had been very hot to the touch the last 2 days, concerns that his medications might have been causing him to be drowsy. Otherwise she had a concerns about poor oral intake and loose bowel movements 2 or 3/day for the last 2 or 3 days. She also expressed that his speech has been mumbled for months.   As well as having poor communication and reaction to her surrounding in the past few months and has been bedbound since his fall and hip fracture in March.   
  
On the road and in the ED was found to have go through multiple rounds as long as over 30 seconds of wide-complex tachycardia. Cardiology consulted. Was started on amiodarone and has been started on magnesium replacement as well. No further tachycardia episodes have been observed. \" We were asked to consult for work up and evaluation of the above problems. Past Medical History:  
Diagnosis Date  CAD (coronary artery disease)  High cholesterol  Hypertension  Parkinson disease ([address]  Stroke [facility]) 2006  
 left sided weakness Past Surgical History:  
Procedure Laterality Date  HX CATARACT REMOVAL Bilateral   
 HX HEART CATHETERIZATION  2006  AR COLONOSCOPY FLX DX W/COLLJ SPEC WHEN PFRMD  5/12/2014 No Known Allergies Meds:  See below Social History Tobacco Use  Smoking status: Former Smoker  Smokeless tobacco: Never Used Substance Use Topics  Alcohol use: No  
  
No family history on file. REVIEW OF SYSTEMS:   
 [x]         Unable to obtain  ROS due to AMS []         Total of 12 systems reviewed as follows: 
 
Constitutional: negative fever, negative chills, negative weight loss Eyes:   negative visual changes ENT:   negative sore throat, tongue or lip swelling Respiratory:  negative cough, negative dyspnea Cards:  negative for chest pain, palpitations, lower extremity edema GI:   negative for nausea, vomiting, diarrhea, and abdominal pain Genitourinary: negative for frequency, dysuria Integument:  negative for rash Hematologic:  negative for easy bruising and gum/nose bleeding Musculoskel: negative for myalgias,  back pain Neurological:  negative for headaches, dizziness, vertigo, weakness Behavl/Psych: negative for feelings of anxiety, depression Pertinent Positives include : 
 
Objective:  
  
Physical Exam: 
 
Last 24hrs VS reviewed since prior progress note. Most recent are: 
 
Visit Vitals BP (!) 148/77 (BP 1 Location: Left upper arm, BP Patient Position: At rest) Pulse 75 Temp 97.5 °F (36.4 °C) Resp 19 Ht 6' 3\" (1.905 m) Wt 77 kg (169 lb 12.1 oz) SpO2 99% BMI 21.22 kg/m² Intake/Output Summary (Last 24 hours) at 5/25/2021 0815 Last data filed at 5/25/2021 3087 Gross per 24 hour Intake 1432.33 ml Output  Net 1432.33 ml General Appearance: Well developed, well nourished, alert & oriented x 1,  
 no acute distress. Ears/Nose/Mouth/Throat: Pupils equal and round, Hearing grossly normal. 
Neck: Supple. JVP within normal limits. Carotids good upstrokes, with no bruit. Chest: Lungs clear to auscultation bilaterally. Cardiovascular: Irregular rate and rhythm, S1S2 normal, no murmur, rubs, gallops. Abdomen: Soft, non-tender, bowel sounds are active. No organomegaly. Extremities: No edema bilaterally. Femoral pulses +2, Distal Pulses +1. Skin: Warm and dry. Neuro: CN II-XII grossly intact, Strength and sensation grossly intact. []         Post-cath site without hematoma, bruit, tenderness, or thrill. Distal pulses intact. Data:  
  
Telemetry:  SR w/ PACs, occas PAF, NSVT 
 
EKG: None available 
[x]  No new EKG for review. Prior to Admission medications Medication Sig Start Date End Date Taking? Authorizing Provider  
carbidopa-levodopa (SINEMET)  mg per tablet Take 1 Tab by mouth three (3) times daily. 5/10/19   Gregg Giles MD  
metoprolol succinate (TOPROL-XL) 25 mg XL tablet Take 1 Tab by mouth daily. 5/11/19   Gregg Gilse MD  
pravastatin (PRAVACHOL) 80 mg tablet Take 1 Tab by mouth daily. 5/10/19   Gregg Giles MD  
polyethylene glycol (MIRALAX) 17 gram/dose powder Take 17 g by mouth daily as needed (constipation). Provider, Historical  
calcium-cholecalciferol, D3, (CALTRATE 600+D) tablet Take 1 Tab by mouth daily. Provider, Historical  
raNITIdine (ZANTAC) 150 mg tablet Take 150 mg by mouth daily.     Other, MD Sima  
amLODIPine (NORVASC) 10 mg tablet Take 10 mg by mouth daily. Provider, Historical  
aspirin 81 mg chewable tablet Take 81 mg by mouth daily. Provider, Historical  
 
 
Recent Results (from the past 24 hour(s)) EKG, 12 LEAD, INITIAL Collection Time: 05/24/21  4:05 PM  
Result Value Ref Range Ventricular Rate 87 BPM  
 Atrial Rate 77 BPM  
 QRS Duration 84 ms Q-T Interval 386 ms QTC Calculation (Bezet) 464 ms Calculated R Axis 21 degrees Calculated T Axis 146 degrees Diagnosis Atrial fibrillation Nonspecific ST and T wave abnormality When compared with ECG of 24-MAY-2021 16:00, 
Atrial fibrillation has replaced Sinus rhythm Confirmed by Beth Eye, P.V. ([id number]) on 5/24/2021 11:15:48 PM 
  
CBC WITH AUTOMATED DIFF Collection Time: 05/24/21  4:11 PM  
Result Value Ref Range WBC 8.0 4.1 - 11.1 K/uL  
 RBC 3.49 (L) 4.10 - 5.70 M/uL  
 HGB 10.6 (L) 12.1 - 17.0 g/dL HCT 33.0 (L) 36.6 - 50.3 % MCV 94.6 80.0 - 99.0 FL  
 MCH 30.4 26.0 - 34.0 PG  
 MCHC 32.1 30.0 - 36.5 g/dL  
 RDW 13.3 11.5 - 14.5 % PLATELET 780 649 - 879 K/uL MPV 10.3 8.9 - 12.9 FL  
 NRBC 0.0 0  WBC ABSOLUTE NRBC 0.00 0.00 - 0.01 K/uL NEUTROPHILS 77 (H) 32 - 75 % BAND NEUTROPHILS 9 % LYMPHOCYTES 6 (L) 12 - 49 % MONOCYTES 6 5 - 13 % EOSINOPHILS 0 0 - 7 % BASOPHILS 1 0 - 1 % METAMYELOCYTES 1 % IMMATURE GRANULOCYTES 0 0.0 - 0.5 % ABS. NEUTROPHILS 6.9 1.8 - 8.0 K/UL  
 ABS. LYMPHOCYTES 0.5 (L) 0.8 - 3.5 K/UL  
 ABS. MONOCYTES 0.5 0.0 - 1.0 K/UL  
 ABS. EOSINOPHILS 0.0 0.0 - 0.4 K/UL  
 ABS. BASOPHILS 0.1 0.0 - 0.1 K/UL  
 ABS. IMM. GRANS. 0.0 0.00 - 0.04 K/UL  
 DF MANUAL    
 RBC COMMENTS NORMOCYTIC, NORMOCHROMIC METABOLIC PANEL, COMPREHENSIVE Collection Time: 05/24/21  4:11 PM  
Result Value Ref Range Sodium 148 (H) 136 - 145 mmol/L Potassium 3.7 3.5 - 5.1 mmol/L Chloride 121 (H) 97 - 108 mmol/L  
 CO2 22 21 - 32 mmol/L Anion gap 5 5 - 15 mmol/L Glucose 83 65 - 100 mg/dL  BUN 24 (H) 6 - 20 MG/DL Creatinine 1.48 (H) 0.70 - 1.30 MG/DL  
 BUN/Creatinine ratio 16 12 - 20 GFR est AA 56 (L) >60 ml/min/1.73m2 GFR est non-AA 46 (L) >60 ml/min/1.73m2 Calcium 6.7 (L) 8.5 - 10.1 MG/DL Bilirubin, total 0.8 0.2 - 1.0 MG/DL  
 ALT (SGPT) <6 (L) 12 - 78 U/L  
 AST (SGOT) 21 15 - 37 U/L Alk. phosphatase 70 45 - 117 U/L Protein, total 4.9 (L) 6.4 - 8.2 g/dL Albumin 1.8 (L) 3.5 - 5.0 g/dL Globulin 3.1 2.0 - 4.0 g/dL A-G Ratio 0.6 (L) 1.1 - 2.2 MAGNESIUM Collection Time: 05/24/21  4:11 PM  
Result Value Ref Range Magnesium 1.7 1.6 - 2.4 mg/dL TROPONIN I Collection Time: 05/24/21  4:11 PM  
Result Value Ref Range Troponin-I, Qt. 0.05 (H) <0.05 ng/mL URINALYSIS W/ RFLX MICROSCOPIC Collection Time: 05/24/21  4:59 PM  
Result Value Ref Range Color DARK YELLOW Appearance CLOUDY (A) CLEAR Specific gravity 1.023 1.003 - 1.030    
 pH (UA) 5.5 5.0 - 8.0 Protein 100 (A) NEG mg/dL Glucose Negative NEG mg/dL Ketone TRACE (A) NEG mg/dL Blood SMALL (A) NEG Urobilinogen 2.0 (H) 0.2 - 1.0 EU/dL Nitrites Negative NEG Leukocyte Esterase Negative NEG    
 WBC 0-4 0 - 4 /hpf  
 RBC 0-5 0 - 5 /hpf Epithelial cells FEW FEW /lpf Bacteria Negative NEG /hpf Amorphous Crystals 1+ (A) NEG Hyaline cast 0-2 0 - 5 /lpf Granular cast 2-5 (A) NEG /lpf  
BILIRUBIN, CONFIRM Collection Time: 05/24/21  4:59 PM  
Result Value Ref Range Bilirubin UA, confirm Negative NEG PROCALCITONIN Collection Time: 05/24/21  9:20 PM  
Result Value Ref Range Procalcitonin 17.37 ng/mL NT-PRO BNP Collection Time: 05/24/21  9:20 PM  
Result Value Ref Range NT pro-BNP 1,209 (H) <450 PG/ML  
LACTIC ACID Collection Time: 05/24/21  9:20 PM  
Result Value Ref Range Lactic acid 1.8 0.4 - 2.0 MMOL/L  
CULTURE, BLOOD, PAIRED Collection Time: 05/24/21  9:20 PM  
 Specimen: Blood Result Value Ref Range  Special Requests: NO SPECIAL REQUESTS Culture result: NO GROWTH AFTER 9 HOURS    
COVID-19 RAPID TEST Collection Time: 05/24/21 10:40 PM  
Result Value Ref Range Specimen source Please find results under separate order COVID-19 rapid test Not detected NOTD METABOLIC PANEL, BASIC Collection Time: 05/25/21 12:23 AM  
Result Value Ref Range Sodium 139 136 - 145 mmol/L Potassium 4.2 3.5 - 5.1 mmol/L Chloride 106 97 - 108 mmol/L  
 CO2 26 21 - 32 mmol/L Anion gap 7 5 - 15 mmol/L Glucose 126 (H) 65 - 100 mg/dL BUN 30 (H) 6 - 20 MG/DL Creatinine 1.81 (H) 0.70 - 1.30 MG/DL  
 BUN/Creatinine ratio 17 12 - 20 GFR est AA 44 (L) >60 ml/min/1.73m2 GFR est non-AA 36 (L) >60 ml/min/1.73m2 Calcium 9.0 8.5 - 10.1 MG/DL MAGNESIUM Collection Time: 05/25/21 12:23 AM  
Result Value Ref Range Magnesium 3.4 (H) 1.6 - 2.4 mg/dL COVID-19 WITH INFLUENZA A/B Collection Time: 05/25/21 12:52 AM  
Result Value Ref Range SARS-CoV-2 Not detected NOTD Influenza A by PCR Not detected NOTD Influenza B by PCR Not detected NOTKATERINA Ji III, DO

## 2021-05-25 NOTE — PALLIATIVE CARE
Brief summary of visit, full note will follow. Met with patient and his partner for over 36 years Ms. Shania Velasco , patient is currently alert, oriented x2 able to answer appropriately, mentally processing slow though however can make simple decisions he is not able to comprehend the complexity of his medical issues. Patient wants her significant other and Ms Lola Lozano to be his voice/medical POA, he completed the medical POA document by putting putting a wanda unable to sign because of weakness due to stroke(refer to ACP note from palliative care team  Ladonna Tran ). We talked about overall complexity of his medical issues with increasing debility, with discuss focus on comfort, recommended to protect him from the from CPR, they have a lot to process, we decided to follow-up tomorrow to continue with goals of care discussion and support patient and family as they go through difficult course of his illness. Patient did tell us he would like to stay home. Briefly educated on hospice philosophy. MsZainab and will call us tomorrow when she is at bedside. Domenica Mcgregor

## 2021-05-25 NOTE — PROGRESS NOTES
End of Shift Note Bedside shift change report given to Dhruv Davis  (oncoming nurse) by Cayetano Marcus RN (offgoing nurse). Report included the following information SBAR, Kardex, ED Summary, Recent Results, Med Rec Status and Cardiac Rhythm AFIB Shift worked:  7a-7p Shift summary and any significant changes:  
  failed speech eval-NPO Concerns for physician to address:   
  
Zone phone for oncoming shift:    
  
 
Activity: 
Activity Level: Bed Rest 
Number times ambulated in hallways past shift: 0 Number of times OOB to chair past shift: 0 Cardiac:  
Cardiac Monitoring: Yes     
Cardiac Rhythm: Atrial Fib Access:  
Current line(s): PIV Genitourinary:  
Urinary status: voiding Respiratory:  
O2 Device: None (Room air) Chronic home O2 use?: NO Incentive spirometer at bedside: YES 
  
GI: 
  
Current diet:  DIET NPO Passing flatus: YES Tolerating current diet: YES Pain Management:  
Patient states pain is manageable on current regimen: NO 
 
Skin: 
Florentin Score: 13 Interventions: foam dressing Patient Safety: 
Fall Score: Total Score: 3 Interventions: bed/chair alarm High Fall Risk: Yes Length of Stay: 
Expected LOS: - - - Actual LOS: 1 Cayetano Marcus, JACK

## 2021-05-25 NOTE — PROGRESS NOTES
Pharmacy Medication Reconciliation The patient was interviewed regarding current PTA medication list, use and drug allergies; Significant other present in room and obtained permission from patient to discuss drug regimen with visitor(s) present. The patient was questioned regarding use of any other inhalers, topical products, over the counter medications, herbal medications, vitamin products or ophthalmic/nasal/otic medication use. Significant other had updated medication lsit. Allergy Update: Patient has no known allergies. Recommendations/Findings: The following amendments were made to the patient's active medication [place] at [id number]:  
 
1) Additions:  
baclofen 2) Deletions:  
miralax 3) Changes:  
Sinemet, pravastatin 
 
 
-Clarified PTA med list with patient's significant other. PTA medication list was corrected to the following:  
 
Prior to Admission Medications Prescriptions Last Dose Informant Taking? amLODIPine (NORVASC) 10 mg tablet  Significant Other Yes Sig: Take 10 mg by mouth daily. aspirin 81 mg chewable tablet  Significant Other Yes Sig: Take 81 mg by mouth daily. baclofen 5 mg tab   Yes Sig: Take 5 mg by mouth two (2) times a day. calcium carbonate (CALTREX) 600 mg calcium (1,500 mg) tablet   Yes Sig: Take 600 mg by mouth three (3) times daily. carbidopa-levodopa (SINEMET)  mg per tablet   No  
Sig: Take 1 Tab by mouth three (3) times daily. Patient taking differently: Take 2 Tablets by mouth three (3) times daily. cholecalciferol (VITAMIN D3) (1000 Units /25 mcg) tablet   Yes Sig: Take 1,000 Units by mouth daily. citalopram (CELEXA) 10 mg tablet   Yes Sig: Take 10 mg by mouth two (2) times a day. famotidine (PEPCID) 20 mg tablet   Yes Sig: Take 20 mg by mouth daily as needed for Heartburn. melatonin 3 mg tablet   Yes Sig: Take 3 mg by mouth nightly. metoprolol succinate (TOPROL-XL) 25 mg XL tablet   Yes Sig: Take 12.5 mg by mouth daily. pravastatin (PRAVACHOL) 40 mg tablet   Yes Sig: Take 40 mg by mouth nightly. Facility-Administered Medications: None Thank you, [facility]

## 2021-05-25 NOTE — PROGRESS NOTES
Hospitalist Progress Note NAME: Sarah Zabala :  1940 MRN:  [id number] Assessment / Plan: 
Recurrent wide complex tachycardia episodes New atrial fibrillation  ,PAF Hypomagnesemia 1.7, resolved 
 
-Continue stepdown monitoring 
-Continue IV amiodarone,  
-Echocardiogram pending 
-Keep potassium greater than 4 and magnesium greater than 2 
-Metoprolol 2.5 mg every 4 hour 
 
  
  
  
Concern for CAP vs aspiration pneumonitis Dehydration Concern for sepsis  (band forms 9%, tachycardia, tachypnea, worsening cognition, initially per nursing staff was responding to pain) 
  
CT brain Unchanged extensive encephalomalacia in the right posterior cerebral 
hemisphere and bilateral cerebellum. 
  
CXR 1. Right-sided calcified pleural plaques. Superimposed mild heterogeneous 
opacities in the right lower lung, as well as in the left mid and lower lungs, 
which may represent edema, atelectasis and/or infection. Urinalysis appears bland 
  
Procalcitonin high 
-Continue IV antibiotics check procalcitonin and pro-BNP strict I/O charting 
 
 
  
Physical deconditioning Recent left hip fracture Parkinson's disease PT and OT evaluation Case management to assist with discharge Continue Sinemet 
 
-left Hip xray, due to complain of left hip pain 
  
  
History of multiple CVAs HLD Can not rule new insult (deteriorating functionality/swallowing and speech) Continue aspirin and statin  consult neurology  brain MRI 
 
  
  
  
Hypernatremia, improved Dehydration CKD stage III Appears stable Continue IVF Code Status: full code Surrogate Decision Maker: to be decided 
  
DVT Prophylaxis: enoxaparin GI Prophylaxis: not indicated 
  
Baseline: bed bound for the past month Consulted palliative care, patient hospice appropriate Subjective: Chief Complaint / Reason for Physician Visit \" Currently on amiodarone drip, episode of A. fib,\".   Discussed with RN events overnight. Review of Systems: 
Symptom Y/N Comments  Symptom Y/N Comments Fever/Chills n   Chest Pain n   
Poor Appetite n   Edema Cough    Abdominal Pain Sputum    Joint Pain SOB/MARTINEZ    Pruritis/Rash Nausea/vomit    Tolerating PT/OT Diarrhea    Tolerating Diet Constipation    Other Could NOT obtain due to:   
 
Objective: VITALS:  
Last 24hrs VS reviewed since prior progress note. Most recent are: 
Patient Vitals for the past 24 hrs: 
 Temp Pulse Resp BP SpO2  
05/25/21 1035 98.1 °F (36.7 °C) 71 14 112/70 95 % 05/25/21 0758 97.5 °F (36.4 °C) 75 19 (!) 148/77 96 % 05/25/21 0600  75 13 124/76   
05/25/21 0545  72 18 130/71   
05/25/21 0530  74 19 (!) 140/69   
05/25/21 0515  69 14 133/64   
05/25/21 0500  63 14 126/67   
05/25/21 0445  67 13 129/68   
05/25/21 0430  79 15 139/71   
05/25/21 0427  80 14 136/74   
05/25/21 0424 98 °F (36.7 °C) 81 14 (!) 153/84   
05/25/21 0330 98.5 °F (36.9 °C) 72 18 134/74 99 % 05/25/21 0300  61 14 124/68 99 % 05/25/21 0200  66 13 (!) 137/58 100 % 05/25/21 0100  62 19 111/60 98 % 05/24/21 2300  81 24 127/83 96 % 05/24/21 2252  76 21  97 % 05/24/21 2215  81 20 129/83   
05/24/21 2200  (!) 142 19 (!) 142/79   
05/24/21 2145  91 22 127/78   
05/24/21 2130  87 20 137/87   
05/24/21 2115  89 19 (!) 138/99 90 % 05/24/21 2100  93 18 (!) 148/78 97 % 05/24/21 2045  92 17 (!) 157/95 97 % 05/24/21 2030  87 23 (!) 152/69 96 % 05/24/21 2015  81 23 132/70 99 % 05/24/21 2000  76 13 130/69 100 % 05/24/21 1945  78 23 128/62 98 % 05/24/21 1930  74 13 118/64 100 % 05/24/21 1915  78 15 126/85 99 % 05/24/21 1900  78 15 117/67 100 % 05/24/21 1845  84 15 137/75 98 % 05/24/21 1844  88  (!) 122/46   
05/24/21 1830  82 20 (!) 122/46 98 % 05/24/21 1815  85 12 (!) 132/54 98 % 05/24/21 1800  84 22 (!) 146/81 99 % 05/24/21 1745  83 24 (!) 140/62 99 % 05/24/21 1730  83 11 134/61 98 %  
05/24/21 1715  81 14 (!) 121/53 100 % 05/24/21 1700  85 13 (!) 126/52 99 % 05/24/21 1615  87 20 (!) 109/55 100 % 05/24/21 1605  92 18 119/67   
05/24/21 1555 98 °F (36.7 °C) 95 22 (!) 124/59 97 % 05/24/21 1546    (!) 123/59  Intake/Output Summary (Last 24 hours) at 5/25/2021 1130 Last data filed at 5/25/2021 3447 Gross per 24 hour Intake 1432.33 ml Output  Net 1432.33 ml I had a face to face encounter and independently examined this patient on 5/25/2021, as outlined below: PHYSICAL EXAM: 
General: WD, WN. Alert, cooperative, no acute distress EENT:  EOMI. Anicteric sclerae. MMM Resp:  CTA bilaterally, no wheezing or rales. No accessory muscle use CV:  IRRegular  rhythm,  No edema GI:  Soft, Non distended, Non tender. +Bowel sounds Neurologic:  Alert and oriented X 2 Psych:   poor insight. Not anxious nor agitated Skin:  No rashes. No jaundice Reviewed most current lab test results and cultures  YES Reviewed most current radiology test results   YES Review and summation of old records today    NO Reviewed patient's current orders and MAR    YES 
PMH/SH reviewed - no change compared to H&P 
________________________________________________________________________ Care Plan discussed with: 
  Comments Patient x Family RN Care Manager Consultant Multidiciplinary team rounds were held today with , nursing, pharmacist and clinical coordinator. Patient's plan of care was discussed; medications were reviewed and discharge planning was addressed. ________________________________________________________________________ Total NON critical care TIME:  35  Minutes Total CRITICAL CARE TIME Spent:   Minutes non procedure based Comments >50% of visit spent in counseling and coordination of care    
________________________________________________________________________ Kerwin Garcia MD  
 Procedures: see electronic medical records for all procedures/Xrays and details which were not copied into this note but were reviewed prior to creation of Plan. LABS: 
I reviewed today's most current labs and imaging studies. Pertinent labs include: 
Recent Labs  
  05/24/21 
1611 WBC 8.0 HGB 10.6* HCT 33.0*  
 Recent Labs  
  05/25/21 
0023 05/24/21 
1611  148* K 4.2 3.7  121* CO2 26 22 * 83 BUN 30* 24* CREA 1.81* 1.48* CA 9.0 6.7* MG 3.4* 1.7 ALB  --  1.8* TBILI  --  0.8 ALT  --  <6* Signed: Basil Lawton MD

## 2021-05-25 NOTE — INTERDISCIPLINARY ROUNDS
Interdisciplinary team rounds were held 5/25/2021 with the following team members:Care Management, Nursing, Nutrition, Pharmacy, Physician and Charge RN. Plan of care discussed. See clinical pathway and/or care plan for interventions and desired outcomes. NPO failed speech, 12 beats vtach cards following. MRI and Echotoday. Continue amio.  Patient is not eating and has palliative consult if want to discuss nutrition support,

## 2021-05-25 NOTE — PROGRESS NOTES
Physical Therapy Order received and acknowledged. Chart reviewed and noted pt is having episodes of v-tach. MD present in room and requests PT evaluation be held. Also noted in chart that there is a palliative consult in place. Will hold PT evaluation until further decisions are made regarding pt's care.

## 2021-05-25 NOTE — PROGRESS NOTES
Transition of Care Plan: 
 
RUR:  16% Disposition: Home with friend and caregivers vs home health vs snf Follow up appointments: To be done prior to discharge. DME needed: To be determined. Transportation at Discharge: Friend vs BLS [place] or means to access home:   His friend has the key to the home. IM Medicare letter: To be given prior to discharge. Caregiver Contact: Juanita Horton (friend--life time [phone]--[phone] Discharge Caregiver contacted prior to discharge? Caregiver to be contacted prior to discharge. Reason for Admission: Patient came to ed for altered mental status and irregular heart beat. He was here few months ago for hip fracture and went to rehab and was then sent home with home health services. PMHX significant for stroke with left sided weakness, htn, hyperlipidemia and parkinson's. RUR Score:   16% Plan for utilizing home health: He has home health services however his life time partner does not know the name of the agency. She will check once she goes back home and let me know. He has been to [address] [place] [address] in the past and has been to [facility] in the past.       
 
PCP: First and Last name:  Manish Santos DO Name of Practice: [address] [place] [address] Are you a current patient: Yes/No: Yes Approximate date of last visit: Couple months ago. Can you participate in a virtual visit with your PCP:  No 
                 
Current Advanced Directive/Advance Care Plan: Full Code Advance Care Planning General Advance Care Planning (ACP) Conversation Date of Conversation: 5/25/21 Conducted with: patient and will discuss with his friend. Healthcare Decision Maker:  
 
Click here to complete [address] including selection of the Healthcare Decision Maker Relationship (ie \"Primary\") Content/Action Overview:  
 
Reviewed DNR/DNI and patient elects Full Code (Attempt Resuscitation) Length of Voluntary ACP Conversation in minutes:  <16 minutes (Non-Billable) Alivia Salguero RN Healthcare Decision Maker:  
Click here to complete [place] including selection of the Healthcare Decision Maker Relationship (ie \"Primary\") Patient lives with his long term partner in a single story home. Spoke with his long time friend and she states he does not use home oxygen or cpap. She states he has a cane, walker and rollator however he has not been doing a lot of walking since March when he had the hip fracture. He has had difficulty with ambulation since the hip fracture. There is a home health agency that comes to the home however the friend could not remember the name of the agency. She will look for it when she gets home and call with the name. She states she assists with feeding and bathing. He does have caregivers who come in from 10am-430pm and do the bathing and dressing. She states they come three days a week. A transport [place] or ambulance takes him to follow up appointments. Patient is presently on Amino drip and to have cardiology consult along with neuro consult. Palliative will also be following. Marion Sánchez RN BSN CRM  [phone]

## 2021-05-25 NOTE — PROGRESS NOTES
Occupational Therapy note: 
 
Order acknowledged, chart reviewed, and spoke with MD present in room. Per MD, patient has been experiencing episodes of v-tach and is waiting on a palliative consult. MD requesting therapy hold this date and continue to follow depending on next steps of care decisions.  
 
Cyrus De La Paz, OTR/L

## 2021-05-25 NOTE — ED NOTES
Completed MRI screening sheet by utilizing hx on file to the best of my ability due to patient not being alert and oriented to complete

## 2021-05-25 NOTE — PROGRESS NOTES
Palliative Medicine [place]: [phone] ([phone]) [facility]: [phone] ([phone]) Rush Linares is a 79 y.o. male with a history of prior stroke with residual left-sided weakness, Parkinson's disease, hypertension and hyperlipidemia who is currently admitted to the hospital after he had not been feeling well for the last few days by his home health staff. His wife also reports that patient has been warm to touch and he has been more lethargic in the past few days. Additionally she also reports that his speech has been more slurred for the last few months and he has been having increased difficulty with movements after his recent hip fracture in March. In the ED he was found to have a wide-complex tachycardia and has been followed by cardiology (from chart). Patient was seen with his life long partner of 40 plus years, Florentin Vazquez. Patient is able to understand simple concepts and did engage in brief conversations about his feeling weak, his hip pain and simple mPOA designation (Florentin Vazquez to be his mPOA, AMD portion of this completed). Patient presents with a flat affect and is is not able to make complex decisions. Palliative team of Dr Norma Lora and this writer spoke to patient and Nona Hayse together. Nona Hayes noted that patient had \"help at home\", upon further investigation, it seems that they had [facility] services. Patient, in his current state and hip fracture may benefit from more support in the home. Gabrielejai Abigail appears capable of assisting to some degree but she too appears elderly and somewhat fragile. She uses a cane and appeared a bit unsteady on her feet. She appears very gracious,  seems devoted to patient and wants to help as much as she can. Melody shared that patient has two children, son and daughter, neither are too involved with him. However, she noted that daughter may come to the hospital \"tomorrow\". Palliative team offered to meet with both Nona Reeseaydee and daughter along with patient to discuss [address].  Nona Hayes has our card and will let us know time to meet when she is at the hospital tomorrow. Patient does not spontaneously share information, secondary to his history of CVAs and current deficits, but given time, he will answer questions. Palliative team will follow up with family / patient tomorrow. Patient may benefit from a Medicaid screening, to see if he is eligible for services. He and Nona Abigail will need additional help at home.

## 2021-05-25 NOTE — PROGRESS NOTES
Pt educated about CVA book for education to be given with discharge instructions and placed in pt's chart for dischrage,

## 2021-05-25 NOTE — PROGRESS NOTES
Spiritual Care Assessment/Progress Note [address] 
 
 
NAME: Jami Figueredo      MRN: [id number] AGE: 79 y.o. SEX: male Jain Affiliation: [facility]  
Language: [place] 5/25/2021     Total Time (in minutes): 5 Spiritual Assessment begun in [facility] 2 PROGRESSIVE CARE through conversation with: 
  
    [x]Patient        [] Family    [] Friend(s) Reason for Consult: Palliative Care, Initial/Spiritual Assessment Spiritual beliefs: (Please include comment if needed) 
   [] Identifies with a karuna tradition:     
   [] Supported by a karuna community:        
   [] Claims no spiritual orientation:       
   [] Seeking spiritual identity:            
   [] Adheres to an individual form of spirituality:       
   [x] Not able to assess:                   
 
    
Identified resources for coping:  
   [] Prayer                           
   [] Music                  [] Guided Imagery 
   [] Family/friends                 [] Pet visits [] Devotional reading                         [x] Unknown 
   [] Other:                                          
 
 
Interventions offered during this visit: (See comments for more details) Patient Interventions: Initial visit, Affirmation of emotions/emotional suffering Plan of Care: 
 
 [] Support spiritual and/or cultural needs  
 [] Support AMD and/or advance care planning process    
 [] Support grieving process 
 [] Coordinate Rites and/or Rituals  
 [] Coordination with community clergy [] No spiritual needs identified at this time 
 [] Detailed Plan of Care below (See Comments)  [] Make referral to Music Therapy 
[] Make referral to Pet Therapy    
[] Make referral to Addiction services 
[] Make referral to [facility] 
[] Make referral to Spiritual Care Partner 
[] No future visits requested       
[] Follow up upon further referrals Comments:  
 
Initial palliative Care Assessment visit for Mr. Benjie Blancas in 2242. Patient was alert but looked a bit confused and lethargic.  explored his concern and coping resources, pt answered his leg. In question about family, he answered he does not know.  advised of further  availability. [id number] [facility] Joe, M.S., Th.M. 
Spiritual Care Provider  Paging Service [phone])

## 2021-05-25 NOTE — ED NOTES
Spoke with Malia Rich in lab, patient's results on BMP are skewed and far off from previous values, concern for validity of results. Will reorder and redraw.

## 2021-05-25 NOTE — ACP (ADVANCE CARE PLANNING)
Primary Decision Maker: Darryl Cade Life Partner - [phone] Advance Care Planning 5/25/2021 Patient's Healthcare Decision Maker is: Named in scanned ACP document Confirm Advance Directive Yes, on file Palliative team of Dr Steven Cheatham and this writer met with patient and his lifetime partner, Shania Velasco together. Jericho Ricci was at patient's bedside. Patient is alert, awake, is able to understand simple concepts and was able to let us know, when asked, that he wanted Shania Velasco to be his medical decision maker, if he is unable to speak to doctors. Melody shared with us that patient does have two children, a son and a daughter (who lives in [place]). Per Jericho Ricci, his children have not been very involved with him. Daughter does know that patient is in the hospital and \"may come tomorrow\" (per Jericho Ricci). Patient did not seem to have the ability to make complex medical decisions, hence the Living Will portion of AMD was not addressed. Copies made of document, placed a copy in hard chart for scanning and original given to Shania Velasco. Palliative team did begin Code Status discussion with Jericho Ricci and patient. Apparently patient and she have not discussed any of patient's wishes in the past. They needed some more time to think about this and Palliative team will return the following day to revisit this conversation. Palliative team also began discussing [address], whether patient would want a focus on comfort vs continued hospital visits and aggressive care. Melody and patient encouraged to continue to talk about this.

## 2021-05-25 NOTE — PROGRESS NOTES
Problem: TIA/CVA Stroke: 0-24 hours Goal: Activity/Safety Outcome: Progressing Towards Goal 
Goal: Consults, if ordered Outcome: Progressing Towards Goal 
Goal: Treatments/Interventions/Procedures Outcome: Progressing Towards Goal 
Goal: Minimize risk of bleeding post-thrombolytic infusion Outcome: Progressing Towards Goal 
  
Problem: Ischemic Stroke: Discharge Outcomes Goal: *Smoking cessation discussed,if applicable(Stroke Metric) Outcome: Progressing Towards Goal

## 2021-05-25 NOTE — ED NOTES
TRANSFER - OUT REPORT: 
 
Verbal report given to Denise (name) on Arpita French  being transferred to PCU (unit) for routine progression of care Report consisted of patients Situation, Background, Assessment and  
Recommendations(SBAR). Information from the following report(s) SBAR, ED Summary, [facility] and Recent Results was reviewed with the receiving nurse. Lines:  
Peripheral IV 05/24/21 Right Antecubital (Active) Peripheral IV 05/24/21 Anterior;Right Forearm (Active) Peripheral IV 05/24/21 Left Forearm (Active) Peripheral IV 05/24/21 Right Forearm (Active) Opportunity for questions and clarification was provided.

## 2021-05-25 NOTE — PROGRESS NOTES
Problem: Falls - Risk of 
Goal: *Absence of Falls Description: Document Edwina Ocasio Fall Risk and appropriate interventions in the flowsheet. Outcome: Progressing Towards Goal 
Note: Fall Risk Interventions: 
  
 
Mentation Interventions: Adequate sleep, hydration, pain control, Bed/chair exit alarm, Evaluate medications/consider consulting pharmacy, Increase mobility, More frequent rounding, Room close to nurse's station, Reorient patient, Toileting rounds, Update white board Medication Interventions: Assess postural VS orthostatic hypotension, Bed/chair exit alarm, Evaluate medications/consider consulting pharmacy Elimination Interventions: Bed/chair exit alarm, Stay With Me (per policy), Toileting schedule/hourly rounds Problem: Patient Education: Go to Patient Education Activity Goal: Patient/Family Education Outcome: Progressing Towards Goal 
  
Problem: Pressure Injury - Risk of 
Goal: *Prevention of pressure injury Description: Document Florentin Scale and appropriate interventions in the flowsheet. Outcome: Progressing Towards Goal 
Note: Pressure Injury Interventions: 
Sensory Interventions: Assess changes in LOC, Assess need for specialty bed, Check visual cues for pain, Float heels, Keep linens dry and wrinkle-free, Minimize linen layers, Monitor skin under medical devices, Pressure redistribution bed/mattress (bed type), Turn and reposition approx. every two hours (pillows and wedges if needed) Moisture Interventions: Absorbent underpads, Apply protective barrier, creams and emollients, Internal/External urinary devices, Maintain skin hydration (lotion/cream), Minimize layers, Moisture barrier, Offer toileting Q_hr Activity Interventions: Assess need for specialty bed, Increase time out of bed, Pressure redistribution bed/mattress(bed type) Mobility Interventions: Assess need for specialty bed, HOB 30 degrees or less, Pressure redistribution bed/mattress (bed type), Turn and reposition approx. every two hours(pillow and wedges) Nutrition Interventions: Document food/fluid/supplement intake, Offer support with meals,snacks and hydration Friction and Shear Interventions: Apply protective barrier, creams and emollients, Foam dressings/transparent film/skin sealants, HOB 30 degrees or less, Lift sheet, Lift team/patient mobility team 
 
  
 
 
 
  
Problem: Patient Education: Go to Patient Education Activity Goal: Patient/Family Education Outcome: Progressing Towards Goal 
  
Problem: Patient Education: Go to Patient Education Activity Goal: Patient/Family Education Outcome: Progressing Towards Goal 
  
Problem: Afib Pathway: Day 1 Goal: Off Pathway (Use only if patient is Off Pathway) Outcome: Progressing Towards Goal 
Goal: Activity/Safety Outcome: Progressing Towards Goal 
Goal: Consults, if ordered Outcome: Progressing Towards Goal 
Goal: Diagnostic Test/Procedures Outcome: Progressing Towards Goal 
Goal: Nutrition/Diet Outcome: Progressing Towards Goal 
Goal: Discharge Planning Outcome: Progressing Towards Goal 
Goal: Medications Outcome: Progressing Towards Goal 
Goal: Respiratory Outcome: Progressing Towards Goal 
Goal: Treatments/Interventions/Procedures Outcome: Progressing Towards Goal 
Goal: Psychosocial 
Outcome: Progressing Towards Goal 
Goal: *Optimal pain control at patient's stated goal 
Outcome: Progressing Towards Goal 
Goal: *Hemodynamically stable Outcome: Progressing Towards Goal 
Goal: *Stable cardiac rhythm Outcome: Progressing Towards Goal 
Goal: *Lungs clear or at baseline Outcome: Progressing Towards Goal 
Goal: *Labs within defined limits Outcome: Progressing Towards Goal 
Goal: *Describes available resources and support systems Outcome: Progressing Towards Goal

## 2021-05-25 NOTE — ED NOTES
Bedside and Verbal shift change report given to [address] (oncoming nurse) by Nickolas Ward RN (offgoing nurse). Report included the following information SBAR, Kardex, ED Summary, Intake/Output, MAR and Recent Results.

## 2021-05-25 NOTE — PROGRESS NOTES
0304: TRANSFER - IN REPORT: 
 
Verbal report received from Erica. RN (name) on Minerva Reyes  being received from ED (unit) for routine progression of care Report consisted of patients Situation, Background, Assessment and  
Recommendations(SBAR). Information from the following report(s) SBAR, Kardex, ED Summary, Intake/Output, MAR, Recent Results, Med Rec Status and Cardiac Rhythm Afib RBR *Afib RVR was reviewed with the receiving nurse. Opportunity for questions and clarification was provided. Assessment completed upon patients arrival to unit and care assumed. 0400: Patient arrived on floor. 0430: Notified Barbra Lundy RRT of patient having 6 beats of VT as this RN attempted to perform incontinence care. Rayna Lane. JACK and Félix French RN at bedside. 0730: End of Shift Note Bedside shift change report given to Fitz Hood RN (oncoming nurse) by Samra Salgado (offgoing nurse). Report included the following information SBAR, Kardex, Intake/Output, MAR, Accordion, Recent Results, Med Rec Status and Cardiac Rhythm Afib/Aflutter/VT Shift worked:  [id number] Shift summary and any significant changes: Please call cards consult. Patient is to have ECHO and MRI this morning. Concerns for physician to address:  Patient had 6 beats of VT upon arrival to floor. Zone phone for oncoming shift:   [id number] Activity: 
  
Number times ambulated in hallways past shift: 0 Number of times OOB to chair past shift: 0 Cardiac:  
Cardiac Monitoring: Yes Access:  
Current line(s): PIV Genitourinary:  
Urinary status: due to void Respiratory:  
O2 Device: Nasal cannula Chronic home O2 use?: N/A Incentive spirometer at bedside: NO 
  
GI: 
  
Current diet:  DIET NPO Passing flatus: NO Tolerating current diet: NO 
  
 
Pain Management:  
Patient states pain is manageable on current regimen: N/A Skin: 
Florentin Score: 11 Interventions: speciality bed, internal/external urinary devices and nutritional support Patient Safety: 
Fall Score: Total Score: 3 Interventions: gripper socks and stay with me (per policy) High Fall Risk: Yes Length of Stay: 
Expected LOS: - - - Actual LOS: [address] [employer]

## 2021-05-26 NOTE — PROGRESS NOTES
Problem: Dysphagia (Adult) Goal: *Acute Goals and Plan of Care (Insert Text) Description: Speech Therapy Goals Initiated 5/25/2021 1. Patient will tolerate medication crushed in puree without overt s/s aspiration within 7 days. MET 2. Patient will participate in re-evaluation of swallow function with liquids and solids within 7 days. MET 3. New goal 5/26/21  Patient will tolerate pureed diet, thin liquids without overt s/s aspiration within 7 days. Outcome: Progressing Towards Goal 
  
SPEECH LANGUAGE PATHOLOGY DYSPHAGIA TREATMENT Patient: Kathi Valdez (1 y.o. male) Date: 5/26/2021 Diagnosis: Wide-complex tachycardia ([address] [I47.2] Wide-complex tachycardia ([address] Precautions: Aspiration ASSESSMENT: 
This date patient tolerated all PO without overt s/s aspiration. Patient again unable to sit fully upright due to hip pain. With solid trials patient demonstrated slow and incomplete mastication with oral residue. Residue cleared with multiple liquid washes. Patient demonstrated belching after all thin liquid trials and after all PO trials which could indicate esophageal dysphagia and increased possibility for post prandial aspiration. Risk of aspiration remains high given dysphagia, poor positioning for feeding, dependent for feeding, h/o CVA and work-up for new CVA, impaired mentation and Parkinson's disease. Given bedside presentation feel safest diet is pureed diet, thin liquids. PLAN: 
Recommendations and Planned Interventions: 
Pureed diet Thin liquids Sit as upright as possible for all PO Single sips Medication in puree Patient continues to benefit from skilled intervention to address the above impairments. Continue treatment per established plan of care. Discharge Recommendations: To Be Determined SUBJECTIVE:  
Patient stated That's right. Patient sat upright in small increments due to crying out with left hip pain.   RN reports tolerating medication in puree. 
 
OBJECTIVE:  
Cognitive and Communication Status: 
Neurologic State: Alert, Confused Orientation Level: Disoriented to place, Disoriented to situation, Oriented to person Cognition: Appropriate for age attention/concentration, Decreased command following Dysphagia Treatment: 
Oral Assessment: P.O. Trials: 
Patient Position:  (Semi upright in bed) Vocal quality prior to P.O.: No impairment Consistency Presented: Ice chips;Puree; Solid; Thin liquid How Presented: Self-fed/presented;SLP-fed/presented;Spoon;Straw;Successive swallows Bolus Acceptance: No impairment Bolus Formation/Control: Impaired Type of Impairment: Delayed; Incomplete;Mastication Propulsion: Delayed (# of seconds) Oral Residue: 10-50% of bolus Initiation of Swallow: Delayed (# of seconds) Laryngeal Elevation: Functional 
Aspiration Signs/Symptoms: None After treatment:  
Patient left in no apparent distress in bed, Call bell within reach, and Nursing notified COMMUNICATION/EDUCATION:  
Patient was educated regarding role of SLP, diet, swallow precautions and POC. Patient did not respond. The patient's plan of care including recommendations, planned interventions, and recommended diet changes were discussed with: Registered nurse. WILMER Lorenzo Time Calculation: 20 mins

## 2021-05-26 NOTE — PROGRESS NOTES
Palliative Medicine [place] [phone] (CLAUDIA) [facility] [phone] (CLAUDIA) Diagnoses: Juwan Jones is a 79 y.o. male with a past history of multiple strokes, Parkinson disease, dementia, hypertension recent left hip fracture stay at [facility] [address] from 3/25/2021 to 3/30/2021 s/p IM nailing. He presented to the ER for deconditioning, poor oral intake and loose bowel movement for 2 to 3 days, slurred speech and dysphagia . (From Dr Brandon Kelley notes). GOALS OF CARE: 
Patient/Health Care Proxy Stated Goals: Comfort TREATMENT PREFERENCES:  
Code Status: DNR Advance Care Planning: 
[] The [employer] Interdisciplinary Team has updated the ACP Navigator with [address] and Patient Capacity Primary Decision Maker (Health Care Agent):   Primary Decision Maker (Active): [employer] - Life Partner - [phone] Relationship to patient: 
[x] Named in a scanned document  
[] Legal Next of Kin 
[] Guardian Medical Interventions:  (DNAR) Family Meeting Documentation Participants: Patient (much more alert today, verbal, able to participate in decision making and verbalized simple wishes), Lori Guillory (mpoa and lifelong partner), daughter Yudelka Barajas who came to the hospital from [place], [place]. Palliative team of Dr Mónica Lopez and this Balwinder Bradley. Psychosocial: Supportive family but few resources. Helga Ren is elderly, walks with a cane, she may not be able to care for patient totally on her own as he is total care at this time. Daughter Torrie Preciado lives in [place], does not drive due to Brayan's" that impedes her from driving, relies on friends to drive her long distances. Patient may be Medicaid eligible and needs to complete an application, may qualify for LT Personal care after a spend down as he may have some savings, per Helga Ren. There are some nieces in the area where they live who may also be of some support. Helga Ren is caring and supportive of patient, but will need support herself.   
 
Patient's appetite is very poor, he has had some recent episodes of dysphagia and was just cleared by speech today to have purees and liquids. Discussion: 1. Palliative team discussed Code status with family. Patient was able to participate and he actually made the decision on his own, that when he dies, he would not want CPR, would want to be allowed to die peacefully. It should be noted that [facility] Staff was not comfortable making this decision for him and it helped her that patient said , \"Nahh\"/ No to chest compressions, intubation etc. DDNR signed by patient. Patient is more alert and interactive today, able to participate more than yesterday. 2. We discussed that hospice would be appropriate as a service and support with patient's recent declines in functioning. [facility], he appears to be more hospice appropriate as he is unable to work with therapy due to the pain in his hip from his fracture and episodes of V-Tach. Family and patient seem interested in this service and are open to meeting with the hospice team. Consult placed for hospice/ they chose [facility]. Outcome / Plan: Left VM for Myah LOPEZ regarding hospice consult and need for Medicaid screening. Patient would benefit from this. LCSW spoke to Dr Ariel Patterson regarding meeting today. Follow Up: Family to meet with hospice and decide on plan, given patient's current presentation.

## 2021-05-26 NOTE — PROGRESS NOTES
Problem: Falls - Risk of 
Goal: *Absence of Falls Description: Document Az Patricio Fall Risk and appropriate interventions in the flowsheet. Outcome: Progressing Towards Goal 
Note: Fall Risk Interventions: 
  
 
Mentation Interventions: Adequate sleep, hydration, pain control, Bed/chair exit alarm, Door open when patient unattended, Evaluate medications/consider consulting pharmacy, More frequent rounding, Reorient patient, Room close to nurse's station, Toileting rounds, Update white board Medication Interventions: Bed/chair exit alarm, Evaluate medications/consider consulting pharmacy, Assess postural VS orthostatic hypotension Elimination Interventions: Bed/chair exit alarm, Call light in reach, Stay With Me (per policy), Toileting schedule/hourly rounds Problem: Patient Education: Go to Patient Education Activity Goal: Patient/Family Education Outcome: Progressing Towards Goal 
  
Problem: Pressure Injury - Risk of 
Goal: *Prevention of pressure injury Description: Document Florentin Scale and appropriate interventions in the flowsheet. Outcome: Progressing Towards Goal 
Note: Pressure Injury Interventions: 
Sensory Interventions: Assess changes in LOC, Check visual cues for pain, Float heels, Maintain/enhance activity level, Minimize linen layers, Turn and reposition approx. every two hours (pillows and wedges if needed) Moisture Interventions: Absorbent underpads, Assess need for specialty bed, Maintain skin hydration (lotion/cream), Minimize layers Activity Interventions: Assess need for specialty bed Mobility Interventions: Assess need for specialty bed, Float heels, HOB 30 degrees or less, Turn and reposition approx. every two hours(pillow and wedges) Nutrition Interventions: Document food/fluid/supplement intake, Offer support with meals,snacks and hydration Friction and Shear Interventions: Apply protective barrier, creams and emollients, HOB 30 degrees or less, Lift sheet Problem: Patient Education: Go to Patient Education Activity Goal: Patient/Family Education Outcome: Progressing Towards Goal 
  
Problem: Afib Pathway: Day 1 Goal: Off Pathway (Use only if patient is Off Pathway) Outcome: Progressing Towards Goal 
Goal: Activity/Safety Outcome: Progressing Towards Goal 
Goal: Consults, if ordered Outcome: Progressing Towards Goal 
Goal: Diagnostic Test/Procedures Outcome: Progressing Towards Goal 
Goal: Nutrition/Diet Outcome: Progressing Towards Goal 
Goal: Discharge Planning Outcome: Progressing Towards Goal 
Goal: Medications Outcome: Progressing Towards Goal 
Goal: Respiratory Outcome: Progressing Towards Goal 
Goal: Treatments/Interventions/Procedures Outcome: Progressing Towards Goal 
Goal: Psychosocial 
Outcome: Progressing Towards Goal 
Goal: *Optimal pain control at patient's stated goal 
Outcome: Progressing Towards Goal 
Goal: *Hemodynamically stable Outcome: Progressing Towards Goal 
Goal: *Stable cardiac rhythm Outcome: Progressing Towards Goal 
Goal: *Lungs clear or at baseline Outcome: Progressing Towards Goal 
Goal: *Labs within defined limits Outcome: Progressing Towards Goal 
Goal: *Describes available resources and support systems Outcome: Progressing Towards Goal 
  
Problem: Delirium Treatment Goal: *Level of consciousness restored to baseline Outcome: Progressing Towards Goal 
Goal: *Level of environmental perceptions restored to baseline Outcome: Progressing Towards Goal 
Goal: *Sensory perception restored to baseline Outcome: Progressing Towards Goal 
Goal: *Emotional stability restored to baseline Outcome: Progressing Towards Goal 
Goal: *Functional assessment restored to baseline Outcome: Progressing Towards Goal 
Goal: *Absence of falls Outcome: Progressing Towards Goal 
Goal: *Will remain free of delirium, CAM Score negative Outcome: Progressing Towards Goal 
Goal: *Cognitive status will be restored to baseline Outcome: Progressing Towards Goal 
Goal: Interventions Outcome: Progressing Towards Goal

## 2021-05-26 NOTE — PROGRESS NOTES
Occupational Therapy note: 
 
Chart reviewed and noted palliative care is still consulting with the pt and his SO. Pt is currently bedbound and has been since his Hip fx in March. He did go to rehab after his fx, but remains nonambulatory, bedbound, and is dependent for all ADLs. Will hold OT evaluation until further decisions are made regarding pt's care.    
 
Lizet Malik, OTR/L

## 2021-05-26 NOTE — PROGRESS NOTES
1900: Bedside and Verbal shift change report given to Jace Can RN (oncoming nurse) by Joshua Allred RN (offgoing nurse). Report included the following information SBAR, Kardex, Intake/Output, MAR, Recent Results, Med Rec Status and Cardiac Rhythm Afib. 
 
2125: Patient's significant other, King Gutierrez, called to see if other family members could attend palliative meeting am. Informed her that would need to call in the morning to find out. Barber Pruitt, patient's daughter, would also be in attendance am. Her number is [phone].   
 
0730 05/26/2021: End of Shift Note Bedside shift change report given to Latisha Foreman RN (oncoming nurse) by Jace Can (offgoing nurse). Report included the following information SBAR, Kardex, Intake/Output, MAR, Recent Results, Med Rec Status and Cardiac Rhythm Aflutter, Afib w/ PVCs Shift worked:  [id number] Shift summary and any significant changes:  
  Patient did not have episodes of VT, threw occassional PVCs. Patient did have XRAY of L leg this shift Concerns for physician to address: Titrate off amio? Zone phone for oncoming shift:   [phone] Activity: 
Activity Level: Bed Rest 
Number times ambulated in hallways past shift: 0 Number of times OOB to chair past shift: 0 Cardiac:  
Cardiac Monitoring: Yes     
Cardiac Rhythm: A flutter Access:  
Current line(s): PIV Genitourinary:  
Urinary status: external catheter Respiratory:  
O2 Device: None (Room air) Chronic home O2 use?: NO Incentive spirometer at bedside: NO 
  
GI: 
  
Current diet:  DIET NPO Passing flatus: NO Tolerating current diet: NO 
  
 
Pain Management:  
Patient states pain is manageable on current regimen: YES Skin: 
Florentin Score: 12 Interventions: speciality bed, internal/external urinary devices and nutritional support Patient Safety: 
Fall Score: Total Score: 3 Interventions: bed alarm, bed rails up x3, frequent turning and repositioning, stay with me High Fall Risk: Yes Length of Stay: 
Expected LOS: - - - Actual LOS: 2 [employer]

## 2021-05-26 NOTE — PROGRESS NOTES
Transition of Care Plan: 
  
RUR:  16% 
  
Disposition: Home with friend and caregivers vs home health vs snf Follow up appointments: To be done prior to discharge.  
  
DME needed: To be determined.  
  
Transportation at Discharge: Friend vs [facility] or means to access home:   His friend has the key to the home. IM Medicare letter: To be given prior to discharge.  
  
Caregiver Contact: Collin Montejo (friend--life time partner--[phone] 
  
Discharge Caregiver contacted prior to discharge? Caregiver to be contacted prior to discharge.  
  
 
 
Spoke with patient's life partner and discussed dcp. She states she and the daughter met today and the patient wants to come home. Per life partner the patient has a son also however he is not involved. Life partner is open to have Fatuma Miles speak with her. Referral sent to Fatuma Miles and they will follow up with family. Per life partner she does not want the medicaid screening done and that patient was over income. Marion Sánchez RN BSN CRM  [phone]

## 2021-05-26 NOTE — PALLIATIVE CARE
Brief summary of visit full note will be placed. Met with the patient, his partner MsZainab and/medical POA and patient daughter Binh Gilman along with palliative care team  Juan Murrieta. Today patient is alert oriented more interactive, processing slow but able to make medical decisions with support of the family. He decided for DO NOT RESUSCITATE and DO NOT INTUBATE he signed the portable DNR, he wants to stay home as long as he lives, agrees for hospice level of care. Hospice consult placed, DNR order is placed in the chart,  contacted for Medicaid screening, for now per family patient can afford to have private duty caregivers. Currently goals are clear, I will sign off, please do not hesitate to consult us for any further needs or questions. Plan coordinated with bedside RN and Dr. Marcellus Valle. Migdalia Salas

## 2021-05-26 NOTE — ACP (ADVANCE CARE PLANNING)
Primary Decision Maker (Active): Graeme Replaced by [facility] - [phone] Advance Care Planning 5/26/2021 Patient's Healthcare Decision Maker is: Named in scanned ACP document Confirm Advance Directive Yes, on file Does the patient have other document types Do Not Resuscitate Patient is alert and more communicative today. Team of Dr Smith Anders and this writer met with patient, his daughter Brooke Renee and his mPOA Juanita Horton. Educated patient and family about his current medical condition and goals of care. We discussed hospice as a possible option, and family in agreement to meet with the hospice team.  
 
Palliative team discussed Code status with family and patient. Patient was able to participate and he actually made the decision on his own, that when he dies, he would not want CPR, would want to be allowed to die peacefully. It should be noted that Sonal Fernández was not comfortable making this decision for him and it helped her that patient said , \"Nahh\"/ No to chest compressions, intubation etc. DDNR signed by patient. Patient is more alert and interactive today, able to participate more than yesterday. Patient signed DDNR, copies made and placed in chart for scanning. A copy was given to both Sonal Fernández and Remedios Dumont.

## 2021-05-26 NOTE — PROGRESS NOTES
Physical Therapy Chart reviewed and noted palliative care is still consulting with the pt and his SO. Pt is currently bedbound and has been since his Hip fx in March. He did go to rehab after his fx, but remains nonambulatory and bedbound. Will hold PT evaluation until further decisions are made regarding pt's care. Not sure if pt is even appropriate for PT intervention.

## 2021-05-26 NOTE — PROGRESS NOTES
0700- report received from [address]- left message for significant other to call back in order to complete MRI screening 
 
1900- End of Shift Note Bedside shift change report given to Ashley Richard RN (oncoming nurse) by Narciso Zayas RN (offgoing nurse). Report included the following information SBAR, Kardex, Med Rec Status and Cardiac Rhythm AFIB/SR Shift worked:  7a-7p Shift summary and any significant changes:  
  amio gtt stopped, PO started Concerns for physician to address:   
  
Zone phone for oncoming shift:    
  
 
Activity: 
Activity Level: Bed Rest 
Number times ambulated in hallways past shift: 0 Number of times OOB to chair past shift: 0 Cardiac:  
Cardiac Monitoring: Yes     
Cardiac Rhythm: Sinus Rhythm Access:  
Current line(s): PIV Genitourinary:  
Urinary status: voiding and external catheter Respiratory:  
O2 Device: None (Room air) Chronic home O2 use?: NO Incentive spirometer at bedside: NO 
  
GI: 
  
Current diet:  DIET DYSPHAGIA PUREED (NDD1) Passing flatus: YES Tolerating current diet: NO 
  
 
Pain Management:  
Patient states pain is manageable on current regimen: YES Skin: 
Florentin Score: 12 Interventions: float heels, foam dressing and PT/OT consult Patient Safety: 
Fall Score: Total Score: 3 Interventions: bed/chair alarm and pt to call before getting OOB High Fall Risk: Yes Length of Stay: 
Expected LOS: - - - Actual LOS: 2 Narciso Zayas RN

## 2021-05-26 NOTE — CONSULTS
[id number] [facility], [facility] Medicine Consult [place]: [phone] ([phone]) Patient Name: Jimy Matt YOB: 1940 Date of Initial Consult: 5/25/21 Reason for Consult: care decisions Requesting Provider: Tarik Patel MD 
Primary Care Physician: Ignacio Giraldo DO 
 
 SUMMARY:  
Jimy Matt is a 79 y.o. male with a past history of multiple strokes, Parkinson disease, dementia, hypertension recent left hip fracture stay at [facility] [address] from 3/25/2021 to 3/30/2021 s/p IM nailing. He presented to the ER for deconditioning, poor oral intake and loose bowel movement for 2 to 3 days, slurred speech and dysphagia . He was admitted on 5/24/2021 from from home with recurrent episode of wide-complex  tachycardia new atrial fibrillation hypomagnesemia dehydration and concern for sepsis community-acquired pneumonia versus aspiration and worsening cognitionencephalopathy. Work-up  CT scan of head does not show any acute intracranial abnormality, unchanged extensive Eencephalomalacia in the right posterior cerebral hemisphere and bilateral cerebellum. Per neuro dysphagia and slowing of the movement is more likely due to progression of his Parkinson disease however MRI without contrast is suggested to rule out stroke, based on family goals . Currently he is n.p.o. per speech. Cardiology following, currently he is on amiodarone drip, they recommend hospice, and suggested suggested palliative care involvement to discuss goals of care. Social history: At baseline he is debilitated, now bedbound since his recent left hip fracture in March of this year, recently discharged from rehab was on home health. He is single has two children son and a daughter daughter is in touch with him, son is estranged. His life partner for 40 years Ms. Sheryl Ospina is his main support and caregiver. PALLIATIVE DIAGNOSES:  
1. Debility due to multiple stroke 2. Cognitive impairment/dementia due to multiple stroke 3. Feeding difficulty 4. Pain in left hip 5. Aspiration pneumonia 6. Goals of care 7. DNR discussion. PLAN:  
1. Follow-up visit today . 2. Dysphagia :chart reviewed, therapy evaluation noted patient is cleared for puréed diet. 3. Pain in the left hip: Continue with Tylenol as needed. 4. Goals of care: Follow-up on conversation from yesterday: Met with the patient, his partner Ms. Oliver and/medical POA and patient daughter Christian Cohn along with palliative care team  Karla Hood. Today patient is alert oriented more interactive, processing slow but able to make medical decisions with support of the family. 
  
He decided for DO NOT RESUSCITATE and DO NOT INTUBATE he signed the portable DNR, he wants to stay home as long as he lives, agrees for hospice level of care. 
  
Hospice consult placed, DNR order is placed in the chart,  contacted for Medicaid screening, for now per family patient can afford to have private duty caregivers. 
  
Currently goals are clear, I will sign off, please do not hesitate to consult us for any further needs or questions. Plan coordinated with bedside RN and Dr. Cesario Gone. Lysbeth Carrel Previous conversation on 5/25/21: 
5. Met with patient and his partner for over 36 years Ms. Lux Jay along with palliative care  Karla Hood, patient is currently alert, oriented x 2 able to answer appropriately, mentally processing slow though however can make simple decisions,  he is not able to comprehend the complexity of his medical issues not bale to tell us medical issues leading to hospitalization. 6. Patient wants her significant other/patner  and Ms Fuchs Smiling to be his voice/medical POA, he completed the medical POA document by  putting a wanda unable to sign because of weakness due to stroke(refer to ACP note from palliative care team  Karla Hood ).  
7. We talked about overall complexity of his medical issues with increasing debility, we discuss focus on comfot, recommended to protect him from the from CPR, they have a lot to process, we decided to follow-up tomorrow to continue with goals of care discussion and support patient and family as they go through difficult course of his illness. Patient did tell us he would like to stay home. Briefly educated on hospice philosophy. 8. Ms. Nisreen Mccracken will call us tomorrow when she is at bedside. Sammy Taylor 9. Initial consult note routed to primary continuity provider and/or primary health care team members 10. Communicated plan of care with: Palliative IDT, Mary [address] Team 
 
 GOALS OF CARE / TREATMENT PREFERENCES:  
 
GOALS OF CARE: 
Patient/Health Care Proxy Stated Goals: Comfort TREATMENT PREFERENCES:  
Code Status: Full Code Advance Care Planning: 
[x] The [facility] Interdisciplinary Team has updated the ACP Navigator with [address] and Patient Capacity Primary Decision Maker (Active): Graeme Partner - [phone] Advance Care Planning 5/25/2021 Patient's Healthcare Decision Maker is: Named in scanned ACP document Confirm Advance Directive Yes, on file Medical Interventions:  (DNAR) Other Instructions: Other: As far as possible, the palliative care team has discussed with patient / health care proxy about goals of care / treatment preferences for patient. HISTORY:  
 
History obtained from: Chart, partner, bed and bedside nurse CHIEF COMPLAINT: Pain in left hip HPI/SUBJECTIVE: The patient is:  
[] Verbal and participatory Patient is alert, answering appropriately though processing slowly. He is complaining of constant dull ache in his left hip since his hip fracture recently in March. His his partner notes patient had been bedbound after coming from rehab since his right hip fracture in March, previously he also had difficulty walking he was \"stumbling\" Patient denies any nausea, vomiting wife notes that he had pain eating poorly of note he eats a soft diet at home, lately lately he was weak getting weak and was found to be confused that prompted visit to the emergency room. His partner notes that today he is looking better almost back to his baseline mental and physical function. 5/26/21: Patient is more interactive and alert today, he notes he has pain in his left hip 5/10, his appetite is poor, he slept well last night. Clinical Pain Assessment (nonverbal scale for severity on nonverbal patients):  
Clinical Pain Assessment Severity: 5 Location: left hip Character: dull achy Duration: few weeks Effect: bed bound Factors: unable to tell me Frequency: constant Activity (Movement): Lying quietly, normal position Duration: for how long has pt been experiencing pain (e.g., 2 days, 1 month, years) Frequency: how often pain is an issue (e.g., several times per day, once every few days, constant) FUNCTIONAL ASSESSMENT:  
 
Palliative Performance Scale (PPS): PPS: 40 PSYCHOSOCIAL/SPIRITUAL SCREENING:  
 
Palliative IDT has assessed this patient for cultural preferences / practices and a referral made as appropriate to needs (Cultural Services, Patient Advocacy, Ethics, etc.) Any spiritual / Latter day concerns: 
[] Yes /  [x] No 
 
Caregiver Burnout: 
[] Yes /  [x] No /  [] No Caregiver Present Anticipatory grief assessment:  
[x] Normal  / [] Maladaptive ESAS Anxiety: Anxiety: 0 
 
ESAS Depression: Depression: 0 REVIEW OF SYSTEMS:  
 
Positive and pertinent negative findings in ROS are noted above in HPI. The following systems were [x] reviewed / [] unable to be reviewed as noted in HPI Other findings are noted below. Systems: constitutional, ears/nose/mouth/throat, respiratory, gastrointestinal, genitourinary, musculoskeletal, integumentary, neurologic, psychiatric, endocrine. Positive findings noted below. Modified ESAS Completed by: provider Fatigue: 8 Drowsiness: 0 Depression: 0 Pain: 5 Anxiety: 0 Nausea: 0 Anorexia: 7 Dyspnea: 0 Constipation: No  
     
 
 
 PHYSICAL EXAM:  
 
From RN flowsheet: 
Wt Readings from Last 3 Encounters:  
05/25/21 169 lb 12.1 oz (77 kg) 05/26/21 169 lb 12.1 oz (77 kg) 03/30/21 169 lb 1.6 oz (76.7 kg) Blood pressure 136/61, pulse 82, temperature 99.2 °F (37.3 °C), resp. rate 10, height 6' 3\" (1.905 m), weight 169 lb 12.1 oz (77 kg), SpO2 98 %. Pain Scale 1: Numeric (0 - 10) Pain Intensity 1: 0 Pain Onset 1: chronic Pain Location 1: Hip Pain Orientation 1: Left Pain Description 1: Aching Pain Intervention(s) 1: Medication (see MAR), Position Last bowel movement, if known:  
 
Constitutional: Debilitated, alert oriented x2, processing slow, more interactive today 
eyes: pupils equal, anicteric ENMT: no nasal discharge, moist mucous membranes Cardiovascular: regular rhythm, no edema Respiratory: breathing not labored, symmetric Gastrointestinal: soft non-tender, +bowel sounds Musculoskeletal: Some muscular wasting , flexion contracture of the left knee,  no tenderness to palpation Skin: warm, dry Neurologic: following commands, moving all extremities Psychiatric: Flat effect Other: 
 
 
 HISTORY:  
 
Principal Problem: 
  Wide-complex tachycardia ([facility] [address] (5/24/2021) Past Medical History:  
Diagnosis Date  CAD (coronary artery disease)  High cholesterol  Hypertension  Parkinson disease ([facility] [address]  Stroke [facility]) 2006  
 left sided weakness Past Surgical History:  
Procedure Laterality Date  HX CATARACT REMOVAL Bilateral   
 HX HEART CATHETERIZATION  2006  MD COLONOSCOPY FLX DX W/COLLJ SPEC WHEN PFRMD  5/12/2014 No family history on file. History reviewed, no pertinent family history. Social History Tobacco Use  Smoking status: Former Smoker  Smokeless tobacco: Never Used Substance Use Topics  Alcohol use: No  
 
No Known Allergies Current Facility-Administered Medications Medication Dose Route Frequency  0.9% sodium chloride infusion  75 mL/hr IntraVENous CONTINUOUS  
 amiodarone (CORDARONE) 375 mg in dextrose 5% 250 mL infusion  0.5 mg/min IntraVENous CONTINUOUS  
 sodium chloride (NS) flush 5-40 mL  5-40 mL IntraVENous Q8H  
 sodium chloride (NS) flush 5-40 mL  5-40 mL IntraVENous PRN  
 acetaminophen (TYLENOL) tablet 650 mg  650 mg Oral Q6H PRN Or  
 acetaminophen (TYLENOL) suppository 650 mg  650 mg Rectal Q6H PRN  polyethylene glycol (MIRALAX) packet 17 g  17 g Oral DAILY PRN  
 carbidopa-levodopa (SINEMET)  mg per tablet 1 Tablet  1 Tablet Oral TID  pravastatin (PRAVACHOL) tablet 80 mg  80 mg Oral DAILY  cefTRIAXone (ROCEPHIN) 2 g in 0.9% sodium chloride (MBP/ADV) 50 mL MBP  2 g IntraVENous Q24H  
 sodium chloride (NS) flush 5-10 mL  5-10 mL IntraVENous PRN  
 aspirin chewable tablet 81 mg  81 mg Oral DAILY  enoxaparin (LOVENOX) injection 40 mg  40 mg SubCUTAneous Q24H  
 doxycycline (VIBRAMYCIN) 100 mg in 0.9% sodium chloride (MBP/ADV) 100 mL MBP  100 mg IntraVENous Q12H  
 metoprolol (LOPRESSOR) injection 2.5 mg  2.5 mg IntraVENous Q4H  
 
 
 
 LAB AND IMAGING FINDINGS:  
 
Lab Results Component Value Date/Time WBC 8.0 05/24/2021 04:11 PM  
 HGB 10.6 (L) 05/24/2021 04:11 PM  
 PLATELET 409 89/69/8429 04:11 PM  
 
Lab Results Component Value Date/Time Sodium 139 05/25/2021 12:23 AM  
 Potassium 4.2 05/25/2021 12:23 AM  
 Chloride 106 05/25/2021 12:23 AM  
 CO2 26 05/25/2021 12:23 AM  
 BUN 30 (H) 05/25/2021 12:23 AM  
 Creatinine 1.81 (H) 05/25/2021 12:23 AM  
 Calcium 9.0 05/25/2021 12:23 AM  
 Magnesium 3.4 (H) 05/25/2021 12:23 AM  
  
Lab Results Component Value Date/Time Alk. phosphatase 70 05/24/2021 04:11 PM  
 Protein, total 4.9 (L) 05/24/2021 04:11 PM  
 Albumin 1.8 (L) 05/24/2021 04:11 PM  
 Globulin 3.1 05/24/2021 04:11 PM  
 
Lab Results Component Value Date/Time  INR 1.0 03/25/2021 03:14 PM  
 Prothrombin time 10.9 03/25/2021 03:14 PM  
  
No results found for: IRON, FE, TIBC, IBCT, PSAT, FERR No results found for: PH, PCO2, PO2 No components found for: [place] Lab Results Component Value Date/Time CK 99 05/07/2019 03:40 AM  
 CK - MB 1.8 12/30/2017 04:38 PM  
  
 
 
   
 
Total time: 35 mins Counseling / coordination time, spent as noted above: 25  mins 
> 50% counseling / coordination?: yes Prolonged service was provided for  []30 min   []75 min in face to face time in the presence of the patient, spent as noted above. Time Start:  
Time End:  
Note: this can only be billed with 52984 (initial) or 08412 (follow up). If multiple start / stop times, list each separately.

## 2021-05-26 NOTE — PROGRESS NOTES
Physician Progress Note PATIENTWasnow Husbands 
CSN #:                  [id number] :                       1940 ADMIT DATE:       2021 3:35 PM 
[address] [place] DATE: 
RESPONDING 
PROVIDER #:        Becki Becerra MD 
 
 
 
 
QUERY TEXT: 
 
Dr Harjinder Middleton: 
 
Pt admitted with Tachycardia episodes & new AFib  and has encephalopathy documented in ED for AMS, confusion. If possible, please document in progress notes and discharge summary further specificity regarding the type of encephalopathy: 
 
The medical record reflects the following: 
Risk Factors: 80yoM w/ hx CVA, bedbound since hip fx in March, Parkinson's, hypernatremia, tachycardia, poor po intake Clinical Indicators: ED presentation w/ AMS,  not acting normal self per home health; lethargic, disoriented and confused; 'Patient presents with an acute encephalopathy' Treatment: IVF, IV abx Rocephin & Doxycycline, Mg IV,  Amiodarone , blood cx. Thank you, 
Rianna Diggs RN, CDI Options provided: 
-- Metabolic encephalopathy 
-- Septic encephalopathy 
-- Toxic encephalopathy 
-- Drug-induced encephalopathy due to *** (name of drug/drugs) -- Drug-induced encephalopathy due to, Please specify substance. -- Drug-induced encephalopathy, substance(s) unknown 
-- Encephalopathy due to *** (other, such as CVA or brain tumor) -- Toxic metabolic encephalopathy 
-- Other - I will add my own diagnosis -- Disagree - Not applicable / Not valid -- Disagree - Clinically unable to determine / Unknown 
-- Refer to Clinical Documentation Reviewer PROVIDER RESPONSE TEXT: 
 
This patient has encephalopathy due to *** (other, such as CVA or brain tumor). Query created by: Danay Tomas on 2021 10:11 AM 
 
 
QUERY TEXT: 
 
Dr Harjinder Middleton Patient admitted with AMS & runs of VTach.    Noted documentation of Sepsis per Cardiology and Concern for sepsis  (band forms 9%, tachycardia, tachypnea, worsening cognition, initially per nursing staff was responding to pain on the H&P. If possible, please document in progress notes and discharge summary if you are evaluating and /or treating any of the following: The medical record reflects the following: 
Risk Factors: 72yo M w/ hx of HTN, stroke w/ left sided weakness, ? Aspiration PNE Clinical Indicators: ED presentation:  AMS, decline over past days to weeks, not eating or drinking, confused and lethargic; HR 99, RR 22- 24, placed on 4L NC initially,  /59 Afebrile, Lactic acid 1.8, 
WBC  8-  PLT  196 -   Neut 77-  Band 9 Lymph 6 Cardiology note: Sepsis Treatment: IV Rocephin, IV Doxycycline, IVF, blood cx, CXR, Palliative consult. Thank you, 
Iris Chowdhury RN, CDI Options provided: 
-- Sepsis confirmed 
-- Sepsis POA ruled out 
-- Other - I will add my own diagnosis -- Disagree - Not applicable / Not valid -- Disagree - Clinically unable to determine / Unknown 
-- Refer to Clinical Documentation Reviewer PROVIDER RESPONSE TEXT: 
 
After study, Sepsis POA confirmed.  
 
Query created by: Rosalba Villegas on 5/26/2021 3:09 PM 
 
 
Electronically signed by:  Mahamed Méndez MD 5/26/2021 4:53 PM

## 2021-05-26 NOTE — PROGRESS NOTES
Progress Note 5/26/2021 8:40 AM 
NAME: Eddie Gordon MRN:  [id number] Admit Diagnosis: Wide-complex tachycardia ([address] [I47.2] Problem List: 1. Paroxysmal atrial fibrillation 2. Wide complex tachycardia; likely NSVT 3. Hypomagnesemia; repleted 4. Hypokalemia; repleted 5. Chronic kidney disease; Stg 4 
6. Sepsis 7. Recent hip fx 8. Parkinsons 9. Remote CVAs 10. Hyperlipidemia Assessment/Plan:  
EF 35-40% Sinus w/ PVCs/PACs 1. Continue amio; transition to melissa when able 2. Transition to Toprol XL when taking PO 
3. Neuro workup underway [x]       High complexity decision making was performed in this patient at high risk for decompensation with multiple organ involvement. Subjective:  
 
Eddie Gordon denies chest pain, dyspnea. Discussed with RN events overnight. Review of Systems: 
 
Symptom Y/N Comments  Symptom Y/N Comments Fever/Chills N   Chest Pain N Poor Appetite N   Edema N   
Cough N   Abdominal Pain N Sputum N   Joint Pain N   
SOB/MARTINEZ N   Pruritis/Rash N   
Nausea/vomit N   Tolerating PT/OT Y Diarrhea N   Tolerating Diet Y Constipation N   Other Could NOT obtain due to:   
 
Objective:  
  
Physical Exam: 
 
Last 24hrs VS reviewed since prior progress note. Most recent are: 
 
Visit Vitals BP (!) 131/55 Pulse 78 Temp 99 °F (37.2 °C) Resp 16 Ht 6' 3\" (1.905 m) Wt 77 kg (169 lb 12.1 oz) SpO2 96% BMI 21.22 kg/m² Intake/Output Summary (Last 24 hours) at 5/26/2021 0840 Last data filed at 5/26/2021 0400 Gross per 24 hour Intake 1810.75 ml Output 600 ml Net 1210.75 ml General Appearance: Well developed, well nourished, alert & oriented x 2,  
 no acute distress. Ears/Nose/Mouth/Throat: Hearing grossly normal. 
Neck: Supple. Chest: Lungs clear to auscultation bilaterally. Cardiovascular: Regular rate and rhythm, S1S2 normal, no murmur. Abdomen: Soft, non-tender, bowel sounds are active. Extremities: No edema bilaterally. Skin: Warm and dry. []         Post-cath site without hematoma, bruit, tenderness, or thrill. Distal pulses intact. PMH/SH reviewed - no change compared to H&P Data Review Telemetry: sinus rhythm w/ ectopy EKG:  
[x]  No new EKG for review Lab Data Personally Reviewed: 
 
Recent Labs  
  05/24/21 
1611 WBC 8.0 HGB 10.6* HCT 33.0*  
 No results for input(s): INR, PTP, APTT, INREXT in the last 72 hours. Recent Labs  
  05/25/21 
0023 05/24/21 
1611  148* K 4.2 3.7  121* CO2 26 22 BUN 30* 24* CREA 1.81* 1.48* * 83  
CA 9.0 6.7* MG 3.4* 1.7 Recent Labs  
  05/24/21 
1611 TROIQ 0.05* Lab Results Component Value Date/Time Cholesterol, total 170 05/07/2019 03:40 AM  
 HDL Cholesterol 44 05/07/2019 03:40 AM  
 LDL, calculated 112.8 (H) 05/07/2019 03:40 AM  
 Triglyceride 66 05/07/2019 03:40 AM  
 CHOL/HDL Ratio 3.9 05/07/2019 03:40 AM  
 
 
Recent Labs  
  05/24/21 
1611 AP 70  
TP 4.9* ALB 1.8*  
GLOB 3.1 No results for input(s): PH, PCO2, PO2 in the last 72 hours. Medications Personally Reviewed: 
 
Current Facility-Administered Medications Medication Dose Route Frequency  0.9% sodium chloride infusion  75 mL/hr IntraVENous CONTINUOUS  
 amiodarone (CORDARONE) 375 mg in dextrose 5% 250 mL infusion  0.5 mg/min IntraVENous CONTINUOUS  
 sodium chloride (NS) flush 5-40 mL  5-40 mL IntraVENous Q8H  
 sodium chloride (NS) flush 5-40 mL  5-40 mL IntraVENous PRN  
 acetaminophen (TYLENOL) tablet 650 mg  650 mg Oral Q6H PRN Or  
 acetaminophen (TYLENOL) suppository 650 mg  650 mg Rectal Q6H PRN  polyethylene glycol (MIRALAX) packet 17 g  17 g Oral DAILY PRN  
 carbidopa-levodopa (SINEMET)  mg per tablet 1 Tablet  1 Tablet Oral TID  pravastatin (PRAVACHOL) tablet 80 mg  80 mg Oral DAILY  cefTRIAXone (ROCEPHIN) 2 g in 0.9% sodium chloride (MBP/ADV) 50 mL MBP  2 g IntraVENous Q24H  
 sodium chloride (NS) flush 5-10 mL  5-10 mL IntraVENous PRN  
 aspirin chewable tablet 81 mg  81 mg Oral DAILY  enoxaparin (LOVENOX) injection 40 mg  40 mg SubCUTAneous Q24H  
 doxycycline (VIBRAMYCIN) 100 mg in 0.9% sodium chloride (MBP/ADV) 100 mL MBP  100 mg IntraVENous Q12H  
 metoprolol (LOPRESSOR) injection 2.5 mg  2.5 mg IntraVENous Q4H Regi Pinedo III, DO

## 2021-05-26 NOTE — PROGRESS NOTES
Hospitalist Progress Note NAME: Immanuel Barber :  1940 MRN:  [id number] Assessment / Plan: 
Recurrent wide complex tachycardia episodes New atrial fibrillation  ,PAF Hypomagnesemia 1.7, resolved 
 
-Continue stepdown monitoring 
-Continue IV amiodarone, will change to p.o. amiodarone when able to take p.o. 
-Echocardiogram with EF of 35 to 40%, which is reduced from prior 
-Keep potassium greater than 4 and magnesium greater than 2 
-Metoprolol 2.5 mg every 4 hour  
 
 -Patient is now able to take p.o., will switch to p.o. metoprolol and p.o. amiodarone 
  
  
Concern for CAP vs aspiration pneumonitis Dehydration Concern for sepsis  (band forms 9%, tachycardia, tachypnea, worsening cognition, initially per nursing staff was responding to pain) Acute metabolic encephalopathy, likely from pneumonia, with underlying CVA 
  
CT brain Unchanged extensive encephalomalacia in the right posterior cerebral 
hemisphere and bilateral cerebellum. 
  
CXR 1. Right-sided calcified pleural plaques. Superimposed mild heterogeneous 
opacities in the right lower lung, as well as in the left mid and lower lungs, 
which may represent edema, atelectasis and/or infection. Urinalysis appears bland 
  
Procalcitonin high 
-Continue IV antibiotics strict I/O charting 
 
 
  
Physical deconditioning Recent left hip fracture Parkinson's disease PT and OT evaluation Case management to assist with discharge Continue Sinemet 
 
-left Hip xray done, was limited. Showed postoperative changes, no significant acute abnormality 
  
  
History of multiple CVAs HLD Can not rule new insult (deteriorating functionality/swallowing and speech) Continue aspirin and statin  consult neurology  brain MRI, still pending 
 
  
  
  
Hypernatremia, improved Dehydration CKD stage III Appears stable Continue IVF Code Status: full code Surrogate Decision Maker: Life partner 
  
DVT Prophylaxis: enoxaparin GI Prophylaxis: not indicated 
  
Baseline: bed bound for the past month Appreciate palliative team's help, family agrees for hospice care, consult for hospice was placed. Anticipating discharge in next 24 to 48 hours, when cleared by case management Subjective: Chief Complaint / Reason for Physician Visit \" No changes overnight,\". Discussed with RN events overnight. Review of Systems: 
Symptom Y/N Comments  Symptom Y/N Comments Fever/Chills n   Chest Pain n   
Poor Appetite n   Edema Cough    Abdominal Pain Sputum    Joint Pain SOB/MARTINEZ    Pruritis/Rash Nausea/vomit    Tolerating PT/OT Diarrhea    Tolerating Diet Constipation    Other Could NOT obtain due to:   
 
Objective: VITALS:  
Last 24hrs VS reviewed since prior progress note. Most recent are: 
Patient Vitals for the past 24 hrs: 
 Temp Pulse Resp BP SpO2  
05/26/21 0800  78     
05/26/21 0745  78 16 (!) 131/55 96 % 05/26/21 0730  81 16 115/68 95 % 05/26/21 0719 99 °F (37.2 °C) 83 18 130/62 95 % 05/26/21 0715  80 19 121/81   
05/26/21 0700  85 19 127/76 98 % 05/26/21 0630  82 17 125/73   
05/26/21 0615  81 15 130/77 96 % 05/26/21 0600  77 20 126/74   
05/26/21 0545  72 18 124/88 96 % 05/26/21 0530  72 14 113/70 97 % 05/26/21 0515  73 19 116/66 97 % 05/26/21 0500  75 15 120/70 97 % 05/26/21 0445  80 14 (!) 122/59 97 % 05/26/21 0430  77 18 (!) 123/59 97 % 05/26/21 0415  86 23 122/64 96 % 05/26/21 0400 99 °F (37.2 °C) 79 18 117/64 96 % 05/26/21 0345  74 16 129/69   
05/26/21 0330  70 14 124/60   
05/26/21 0315  74 17 112/71   
05/26/21 0300  64 19 111/74   
05/26/21 0245  77 19 120/60 96 % 05/26/21 0230  84 17 118/70 96 % 05/26/21 0215  83 15 125/70 96 % 05/26/21 0200  79 20 123/83 96 % 05/26/21 0145  81 18 117/83 96 % 05/26/21 0130  71 19 116/67   
05/26/21 0115  80 17 132/62   
05/26/21 0100  86 19 131/84   
05/26/21 0045  74 13 112/72   
05/26/21 0015  91 17 134/67 96 % 05/26/21 0000  94 20 (!) 145/74 96 % 05/25/21 2345  92 21 135/68 95 % 05/25/21 2337 99.1 °F (37.3 °C) 78 23 133/76 94 % 05/25/21 2330  89 23 133/76   
05/25/21 2315  78 24 125/69 92 % 05/25/21 2300  79 24 (!) 137/92 96 % 05/25/21 2245  97 20 133/75 96 % 05/25/21 2230  88 19 131/80 97 % 05/25/21 2215  90 17 (!) 146/82 96 % 05/25/21 2200  88 20 138/79 100 % 05/25/21 2145  97 29 (!) 145/83 98 % 05/25/21 2130  91 25 (!) 147/87 (!) 77 % 05/25/21 2115  84 16 (!) 141/93   
05/25/21 2100  86 17 136/79   
05/25/21 2045  86 21 (!) 129/58   
05/25/21 2030  72 16 113/68   
05/25/21 2015  73 18 (!) 120/97   
05/25/21 2000 98.4 °F (36.9 °C) 75 17 125/67 96 % 05/25/21 1945  76 14 129/64 100 % 05/25/21 1930  66 17 130/77   
05/25/21 1915  72 19 109/74   
05/25/21 1900  64 (!) 7 109/70   
05/25/21 1541    106/67   
05/25/21 1518 98.9 °F (37.2 °C) 68 16 106/67 100 % 05/25/21 1035 98.1 °F (36.7 °C) 71 14 112/70 95 % Intake/Output Summary (Last 24 hours) at 5/26/2021 1022 Last data filed at 5/26/2021 0400 Gross per 24 hour Intake 1810.75 ml Output 600 ml Net 1210.75 ml I had a face to face encounter and independently examined this patient on 5/26/2021, as outlined below: PHYSICAL EXAM: 
General: WD, WN. Alert, cooperative, no acute distress EENT:  EOMI. Anicteric sclerae. MMM Resp:  CTA bilaterally, no wheezing or rales. No accessory muscle use CV:  Regular  rhythm,  No edema GI:  Soft, Non distended, Non tender. +Bowel sounds Neurologic:  Alert and oriented X 1-2 Psych:   poor insight. Not anxious nor agitated Skin:  No rashes. No jaundice Reviewed most current lab test results and cultures  YES Reviewed most current radiology test results   YES Review and summation of old records today    NO Reviewed patient's current orders and MAR    YES 
PMH/SH reviewed - no change compared to H&P 
________________________________________________________________________ Care Plan discussed with: 
  Comments Patient x Family RN Care Manager Consultant Multidiciplinary team rounds were held today with , nursing, pharmacist and clinical coordinator. Patient's plan of care was discussed; medications were reviewed and discharge planning was addressed. ________________________________________________________________________ Total NON critical care TIME:  35  Minutes Total CRITICAL CARE TIME Spent:   Minutes non procedure based Comments >50% of visit spent in counseling and coordination of care    
________________________________________________________________________ Kathy Hopkins MD  
 
Procedures: see electronic medical records for all procedures/Xrays and details which were not copied into this note but were reviewed prior to creation of Plan. LABS: 
I reviewed today's most current labs and imaging studies. Pertinent labs include: 
Recent Labs  
  05/24/21 
1611 WBC 8.0 HGB 10.6* HCT 33.0*  
 Recent Labs  
  05/25/21 
0023 05/24/21 
1611  148* K 4.2 3.7  121* CO2 26 22 * 83 BUN 30* 24* CREA 1.81* 1.48* CA 9.0 6.7* MG 3.4* 1.7 ALB  --  1.8* TBILI  --  0.8 ALT  --  <6* Signed: Kathy Hopkins MD

## 2021-05-27 NOTE — HOSPICE
[facility] SW Note: This MSW and [facility] RN spoke with pt's partner Mackenzie Koehler via telephone. Melody requested an in-person hospice meeting to discuss what hospice looks like at home and what hospice offers. Hospice information meeting scheduled for tomorrow, Friday 5/28 at 10:30 am in room. Camila Vann LMSW [address] [facility] Worker [phone]

## 2021-05-27 NOTE — PROGRESS NOTES
0700- report received from [facility]- report called to Jef Bustillos, [facility]. Patient being transferred to room 3256. Room is not clean, left number to call back when ready 1520- patient transferred to room 3256

## 2021-05-27 NOTE — PROGRESS NOTES
Speech Pathology Patient's family has decided on transitioning to hospice care at home. RN reports patient is tolerating puree diet/thin liquids and whole PO meds, but has a limited appetite. SLP will sign off at this time.

## 2021-05-27 NOTE — PROGRESS NOTES
Unable to obtain AM labs. Two nurse attempt, both unsuccessful. Pt appears uncomfortable with frequent grimacing/moaning. Also throughout the night his L hip and leg remained externally rotated. Refusing to reposition leg.

## 2021-05-27 NOTE — PROGRESS NOTES
Hospitalist Progress Note NAME: Serg Lechuga :  1940 MRN:  [id number] Assessment / Plan: 
Recurrent wide complex tachycardia episodes New atrial fibrillation  ,PAF Hypomagnesemia 1.7, resolved 
 
 
-Initially was on IV amiodarone, switched to p.o. amiodarone 
-Echocardiogram with EF of 35 to 40%, which is reduced from prior 
-Keep potassium greater than 4 and magnesium greater than 2 
-P. o. metoprolol 
 
 
  
Concern for CAP vs aspiration pneumonitis Dehydration Concern for sepsis  (band forms 9%, tachycardia, tachypnea, worsening cognition, initially per nursing staff was responding to pain) Acute metabolic encephalopathy, likely from pneumonia, with underlying CVA 
  
CT brain Unchanged extensive encephalomalacia in the right posterior cerebral 
hemisphere and bilateral cerebellum. 
  
CXR 1. Right-sided calcified pleural plaques. Superimposed mild heterogeneous 
opacities in the right lower lung, as well as in the left mid and lower lungs, 
which may represent edema, atelectasis and/or infection. Urinalysis appears bland 
  
Procalcitonin high 
-Continue IV antibiotics strict I/O charting 
 
 
  
Physical deconditioning Recent left hip fracture Parkinson's disease PT and OT evaluation Case management to assist with discharge Continue Sinemet 
 
-left Hip xray done, was limited. Showed postoperative changes, no significant acute abnormality 
  
  
History of multiple CVAs HLD Can not rule new insult (deteriorating functionality/swallowing and speech) Continue aspirin and statin  consult neurology 
 
  
  
  
Hypernatremia, improved Dehydration CKD stage III Appears stable Continue IVF Code Status: full code Surrogate Decision Maker: Life partner 
  
DVT Prophylaxis: enoxaparin GI Prophylaxis: not indicated 
  
Baseline: bed bound for the past month Appreciate palliative team's help,  Family and pt has opted for hospice care at home.   Hospice was consulted, will have a meeting tomorrow Subjective: Chief Complaint / Reason for Physician Visit \" No changes overnight,\". Discussed with RN events overnight. On telemetry remains sinus with episodes of flutter Review of Systems: 
Symptom Y/N Comments  Symptom Y/N Comments Fever/Chills n   Chest Pain n   
Poor Appetite n   Edema Cough    Abdominal Pain Sputum    Joint Pain SOB/MARTINEZ    Pruritis/Rash Nausea/vomit    Tolerating PT/OT Diarrhea    Tolerating Diet Constipation    Other Could NOT obtain due to:   
 
Objective: VITALS:  
Last 24hrs VS reviewed since prior progress note. Most recent are: 
Patient Vitals for the past 24 hrs: 
 Temp Pulse Resp BP SpO2  
05/27/21 1035 98.7 °F (37.1 °C) 68 13 124/74 94 % 05/27/21 0723 98.1 °F (36.7 °C) 84 24 129/84 94 % 05/27/21 0431 99 °F (37.2 °C) 83 20 (!) 144/82 93 % 05/27/21 0000  78     
05/26/21 2321 99.5 °F (37.5 °C) 90 19 135/80 95 % 05/26/21 2000  82     
05/26/21 1930 98.6 °F (37 °C) 84 16 138/71 96 % 05/26/21 1600  85     
05/26/21 1530  81 15 123/71   
05/26/21 1500  78 15 (!) 121/59 95 % 05/26/21 1448 98.1 °F (36.7 °C) 84 18 137/67 95 % 05/26/21 1430  81 18 (!) 126/101 95 % Intake/Output Summary (Last 24 hours) at 5/27/2021 1411 Last data filed at 5/27/2021 7005 Gross per 24 hour Intake 2221.75 ml Output 1700 ml Net 521.75 ml I had a face to face encounter and independently examined this patient on 5/27/2021, as outlined below: PHYSICAL EXAM: 
General: WD, WN. Alert, cooperative, no acute distress EENT:  EOMI. Anicteric sclerae. MMM Resp:  CTA bilaterally, no wheezing or rales. No accessory muscle use CV:  Regular  rhythm,  No edema GI:  Soft, Non distended, Non tender. +Bowel sounds Neurologic:  Alert and oriented X 1-2 Psych:   poor insight. Not anxious nor agitated Skin:  No rashes. No jaundice Reviewed most current lab test results and cultures  YES Reviewed most current radiology test results   YES Review and summation of old records today    NO Reviewed patient's current orders and MAR    YES 
PMH/SH reviewed - no change compared to H&P 
________________________________________________________________________ Care Plan discussed with: 
  Comments Patient x Family RN Care Manager Consultant Multidiciplinary team rounds were held today with , nursing, pharmacist and clinical coordinator. Patient's plan of care was discussed; medications were reviewed and discharge planning was addressed. ________________________________________________________________________ Total NON critical care TIME:  35  Minutes Total CRITICAL CARE TIME Spent:   Minutes non procedure based Comments >50% of visit spent in counseling and coordination of care    
________________________________________________________________________ Ellis Thomas MD  
 
Procedures: see electronic medical records for all procedures/Xrays and details which were not copied into this note but were reviewed prior to creation of Plan. LABS: 
I reviewed today's most current labs and imaging studies. Pertinent labs include: 
Recent Labs  
  05/24/21 
1611 WBC 8.0 HGB 10.6* HCT 33.0*  
 Recent Labs  
  05/25/21 
0023 05/24/21 
1611  148* K 4.2 3.7  121* CO2 26 22 * 83 BUN 30* 24* CREA 1.81* 1.48* CA 9.0 6.7* MG 3.4* 1.7 ALB  --  1.8* TBILI  --  0.8 ALT  --  <6* Signed: Tilda Thomas, MD

## 2021-05-28 NOTE — PROGRESS NOTES
Problem: Falls - Risk of 
Goal: *Absence of Falls Description: Document Mary Astorga Fall Risk and appropriate interventions in the flowsheet. Outcome: Progressing Towards Goal 
Note: Fall Risk Interventions: 
Mobility Interventions: Bed/chair exit alarm, Communicate number of staff needed for ambulation/transfer, Patient to call before getting OOB Mentation Interventions: Adequate sleep, hydration, pain control, Bed/chair exit alarm, Reorient patient, Room close to nurse's station Medication Interventions: Bed/chair exit alarm, Patient to call before getting OOB, Teach patient to arise slowly Elimination Interventions: Bed/chair exit alarm, Call light in reach

## 2021-05-28 NOTE — PROGRESS NOTES
Hospitalist Progress Note NAME: Misty Vaughn :  1940 MRN:  [id number] Assessment / Plan: 
Recurrent wide complex tachycardia episodes New atrial fibrillation  ,PAF Hypomagnesemia 1.7, resolved 
 
 
-Initially was on IV amiodarone, switched to p.o. amiodarone 
-Echocardiogram with EF of 35 to 40%, which is reduced from prior 
-Keep potassium greater than 4 and magnesium greater than 2 
-P. o. metoprolol 
 
 
  
Concern for CAP vs aspiration pneumonitis Dehydration Concern for sepsis  (band forms 9%, tachycardia, tachypnea, worsening cognition, initially per nursing staff was responding to pain) Acute metabolic encephalopathy, likely from pneumonia, with underlying CVA 
  
CT brain Unchanged extensive encephalomalacia in the right posterior cerebral 
hemisphere and bilateral cerebellum. 
  
CXR 1. Right-sided calcified pleural plaques. Superimposed mild heterogeneous 
opacities in the right lower lung, as well as in the left mid and lower lungs, 
which may represent edema, atelectasis and/or infection. Urinalysis appears bland 
  
Procalcitonin high 
-Continue IV antibiotics strict I/O charting 
 
 
  
Physical deconditioning Recent left hip fracture Parkinson's disease PT and OT evaluation Case management to assist with discharge Continue Sinemet 
 
-left Hip xray done, was limited. Showed postoperative changes, no significant acute abnormality 
  
  
History of multiple CVAs HLD Can not rule new insult (deteriorating functionality/swallowing and speech) Continue aspirin and statin  consult neurology 
 
  
  
  
Hypernatremia, improved Dehydration CKD stage III Appears stable Continue IVF Code Status: full code Surrogate Decision Maker: Life partner 
  
DVT Prophylaxis: enoxaparin GI Prophylaxis: not indicated 
  
Baseline: bed bound for the past month Hospice meeting this a.m., likely transition to home with hospice at some point.   
 
Subjective: Chief Complaint / Reason for Physician Visit No events overnight Review of Systems: 
Symptom Y/N Comments  Symptom Y/N Comments Fever/Chills n   Chest Pain n   
Poor Appetite n   Edema Cough    Abdominal Pain Sputum    Joint Pain SOB/MARTINEZ    Pruritis/Rash Nausea/vomit    Tolerating PT/OT Diarrhea    Tolerating Diet Constipation    Other Could NOT obtain due to:   
 
Objective: VITALS:  
Last 24hrs VS reviewed since prior progress note. Most recent are: 
Patient Vitals for the past 24 hrs: 
 Temp Pulse Resp BP SpO2  
05/28/21 0758 99.3 °F (37.4 °C) 86 16 (!) 159/77 95 % 05/28/21 0351 98.5 °F (36.9 °C) 86 16 (!) 147/86 100 % 05/27/21 2311 98.7 °F (37.1 °C) 84 16 (!) 165/79 100 % 05/27/21 1943 97.6 °F (36.4 °C) 74 16 (!) 155/79 99 % 05/27/21 1542 97.6 °F (36.4 °C) 76 16 (!) 167/84 94 % 05/27/21 1422 98.6 °F (37 °C) 72 16 138/80 93 % Intake/Output Summary (Last 24 hours) at 5/28/2021 1116 Last data filed at 5/28/2021 0830 Gross per 24 hour Intake 120 ml Output 100 ml Net 20 ml I had a face to face encounter and independently examined this patient on 5/28/2021, as outlined below: PHYSICAL EXAM: 
General: WD, WN. Alert, cooperative, no acute distress EENT:  EOMI. Anicteric sclerae. MMM Resp:  CTA bilaterally, no wheezing or rales. No accessory muscle use CV:  Regular  rhythm,  No edema GI:  Soft, Non distended, Non tender. +Bowel sounds Neurologic:  Alert and oriented X 1-2 Psych:   poor insight. Not anxious nor agitated Skin:  No rashes. No jaundice Reviewed most current lab test results and cultures  YES Reviewed most current radiology test results   YES Review and summation of old records today    NO Reviewed patient's current orders and MAR    YES 
PMH/SH reviewed - no change compared to H&P 
________________________________________________________________________ Care Plan discussed with: 
  Comments Patient x Family RN    
Care Manager Consultant Multidiciplinary team rounds were held today with , nursing, pharmacist and clinical coordinator. Patient's plan of care was discussed; medications were reviewed and discharge planning was addressed. ________________________________________________________________________ Total NON critical care TIME:  35  Minutes Total CRITICAL CARE TIME Spent:   Minutes non procedure based Comments >50% of visit spent in counseling and coordination of care    
________________________________________________________________________ Andre Lenz MD  
 
Procedures: see electronic medical records for all procedures/Xrays and details which were not copied into this note but were reviewed prior to creation of Plan. LABS: 
I reviewed today's most current labs and imaging studies. Pertinent labs include: No results for input(s): WBC, HGB, HCT, PLT, HGBEXT, HCTEXT, PLTEXT, HGBEXT, HCTEXT, PLTEXT in the last 72 hours. No results for input(s): NA, K, CL, CO2, GLU, BUN, CREA, CA, MG, PHOS, ALB, TBIL, TBILI, ALT, INR, INREXT, INREXT in the last 72 hours. No lab exists for component: SGOT Signed: Andre Lenz MD

## 2021-05-28 NOTE — PROGRESS NOTES
End of Shift Note Bedside shift change report given to Vamsi Rhodes RN (oncoming nurse) by Valentín Epperson (offgoing nurse). Report included the following information SBAR, Kardex, ED Summary, Intake/Output, MAR and Recent Results Shift worked:  [id number] Shift summary and any significant changes: No significant changes Concerns for physician to address:   
  
Zone phone for oncoming shift:    
  
 
Activity: 
Activity Level: Bed Rest 
Number times ambulated in hallways past shift: 0 Number of times OOB to chair past shift: 0 Cardiac:  
Cardiac Monitoring: Yes     
Cardiac Rhythm: Sinus Rhythm Access:  
Current line(s): PIV Genitourinary:  
Urinary status: incontinent and external catheter Respiratory:  
O2 Device: None (Room air) Chronic home O2 use?: NO Incentive spirometer at bedside: NO 
  
GI: 
  
Current diet:  DIET DYSPHAGIA PUREED (NDD1) Passing flatus: YES Tolerating current diet: YES Pain Management:  
Patient states pain is manageable on current regimen: YES Skin: 
Florentin Score: 12 Interventions: float heels, PT/OT consult, internal/external urinary devices and nutritional support Patient Safety: 
Fall Score: Total Score: 3 Interventions: bed/chair alarm and pt to call before getting OOB High Fall Risk: Yes Length of Stay: 
Expected LOS: 3d 12h Actual LOS: 4 Valentín Epperson

## 2021-05-28 NOTE — HOSPICE
[facility] Good Help to Those in Need 
[phone] Nursing Note Patient Name: Kory Galindo YOB: 1940 Age: 79 [facility] Note: In to meet with Mary anthony. Discussed Hospice philosophy, general plan of care, levels of care, services and on call procedures. Family information packet provided and reviewed with Samuel. Samuel is in agreement with home hospice but needs to get [facility] equipment picked up before [facility] can delivery hospice DMEs. Hospice consents will be signed today and anticipate discharge with home hospice admission on Sunday or Monday. If there are any questions, please call this hospice liaison at [phone] or our main number [phone]. Thank you for the opportunity to be of service to this patient and his family.

## 2021-05-28 NOTE — HOSPICE
[address] For Admission  Note: This MSW met with pt and family for hospice consult. Psychosocial needs; Lives with life partner/MPOA Jericho Ricci. Provided resource guide and highlighted personal care agencies. MSW provided education that hospice is not a caregiver provider. Pt does not have Medicaid. Jericho Ricci reported he does not qualify. Pt may need placement in future if pt requires more care Pt/caregiver; Melody (life partner/MPOA). Melody signed consents as pt is unable to make complex decisions Pt is DDNR; Yes, already scanned in chart Home admission is scheduled for TBD, dependent on discharge date Consents Reviewed: Yes Person Reviewed/Signed with: Shania Velasco (life partner/MPOA) Right to NCD Reviewed: Yes NCD Requested: Yes Admission Nurse/Intake Notified: Yes Planned Start of Care Date: TBD, dependent on discharge date Brooklynn Elliott LMSW [address] [facility] Worker [phone]

## 2021-05-29 NOTE — PROGRESS NOTES
Bedside and Verbal shift change report given to Marisabel Sánchez5 (oncoming nurse) by Rona Villalba (offgoing nurse). Report included the following information SBAR, Kardex, Intake/Output, MAR and Recent Results.

## 2021-05-29 NOTE — PROGRESS NOTES
Right arm is swollen, red in color. Some redness/rash around the neck.   Propped arm up on pillow, will notify MD.

## 2021-05-29 NOTE — PROGRESS NOTES
End of Shift Note Bedside shift change report given to Ashu Reeves (oncoming nurse) by Shannon Vargas (offgoing nurse). Report included the following information SBAR, Kardex, Intake/Output, MAR and Recent Results Shift worked:  1622-2950 Shift summary and any significant changes: No significant changes, pt is very hard to understand when he speaks and is resistant to turning due to condition of hip, even with pain medication. Concerns for physician to address:  pain management Zone phone for oncoming shift:    
  
 
Activity: 
Activity Level: Bed Rest 
Number times ambulated in hallways past shift: 0 Number of times OOB to chair past shift: 0 Cardiac:  
Cardiac Monitoring: Yes     
Cardiac Rhythm: Sinus Rhythm Access:  
Current line(s): PIV Genitourinary:  
Urinary status: voiding, incontinent and external catheter Respiratory:  
O2 Device: None (Room air) Chronic home O2 use?: NO Incentive spirometer at bedside: NO 
  
GI: 
Last Bowel Movement Date:  (pt cannot recall) Current diet:  DIET DYSPHAGIA PUREED (NDD1) Passing flatus: YES Tolerating current diet: YES Pain Management:  
Patient states pain is manageable on current regimen: N/A Skin: 
Florentin Score: 12 Interventions: float heels Patient Safety: 
Fall Score: Total Score: 3 Interventions: pt to call before getting OOB High Fall Risk: Yes Length of Stay: 
Expected LOS: 3d 12h Actual LOS: [address]

## 2021-05-29 NOTE — PROGRESS NOTES
Hospitalist Progress Note NAME: Sarah Zabala :  1940 MRN:  [id number] Assessment / Plan: 
Recurrent wide complex tachycardia episodes New atrial fibrillation  ,PAF Hypomagnesemia 1.7, resolved 
 
 
-Initially was on IV amiodarone, switched to p.o. amiodarone 
-Echocardiogram with EF of 35 to 40%, which is reduced from prior 
-Keep potassium greater than 4 and magnesium greater than 2 
-P. o. metoprolol 
 
 
  
Concern for CAP vs aspiration pneumonitis Dehydration Concern for sepsis  (band forms 9%, tachycardia, tachypnea, worsening cognition, initially per nursing staff was responding to pain) Acute metabolic encephalopathy, likely from pneumonia, with underlying CVA 
  
CT brain Unchanged extensive encephalomalacia in the right posterior cerebral 
hemisphere and bilateral cerebellum. 
  
CXR 1. Right-sided calcified pleural plaques. Superimposed mild heterogeneous 
opacities in the right lower lung, as well as in the left mid and lower lungs, 
which may represent edema, atelectasis and/or infection. Urinalysis appears bland 
  
-Initially on IV antibiotics, switched to p.o. doxycycline 
 
 
  
Physical deconditioning Recent left hip fracture Parkinson's disease PT and OT evaluation Case management to assist with discharge Continue Sinemet 
 
-left Hip xray done, was limited. Showed postoperative changes, no significant acute abnormality 
  
  
History of multiple CVAs HLD Can not rule new insult (deteriorating functionality/swallowing and speech) Continue aspirin and statin  consult neurology 
 
  
  
  
Hypernatremia, improved Dehydration CKD stage III Appears stable Continue IVF Code Status: full code Surrogate Decision Maker: Life partner 
  
DVT Prophylaxis: enoxaparin GI Prophylaxis: not indicated 
  
Baseline: bed bound for the past month Appreciate palliative team's and hospice team help.   Plan for home with hospice, hopefully should be able to go in next day or so Subjective: Chief Complaint / Reason for Physician Visit No events overnight Review of Systems: 
Symptom Y/N Comments  Symptom Y/N Comments Fever/Chills n   Chest Pain n   
Poor Appetite n   Edema Cough n   Abdominal Pain Sputum    Joint Pain SOB/MARTINEZ    Pruritis/Rash Nausea/vomit    Tolerating PT/OT Diarrhea    Tolerating Diet Constipation    Other Could NOT obtain due to:   
 
Objective: VITALS:  
Last 24hrs VS reviewed since prior progress note. Most recent are: 
Patient Vitals for the past 24 hrs: 
 Temp Pulse Resp BP SpO2  
05/29/21 0742 99.3 °F (37.4 °C) 77 14 (!) 148/76 95 % 05/29/21 0350 98.5 °F (36.9 °C) 84 16 (!) 166/81 98 % 05/28/21 2238 98.1 °F (36.7 °C) 87 18 133/73 99 % 05/28/21 1952 98.4 °F (36.9 °C) 84 17 (!) 155/74 97 % 05/28/21 1453 98.7 °F (37.1 °C) 77 16 (!) 158/75 94 % 05/28/21 1220 97.9 °F (36.6 °C) 78 16 (!) 153/77 97 % Intake/Output Summary (Last 24 hours) at 5/29/2021 0930 Last data filed at 5/29/2021 1723 Gross per 24 hour Intake 60 ml Output 800 ml Net -740 ml I had a face to face encounter and independently examined this patient on 5/29/2021, as outlined below: PHYSICAL EXAM: 
General: WD, WN. Alert, cooperative, no acute distress EENT:  EOMI. Anicteric sclerae. MMM Resp:  CTA bilaterally, no wheezing or rales. No accessory muscle use CV:  Regular  rhythm,  No edema GI:  Soft, Non distended, Non tender. +Bowel sounds Neurologic:  Alert and oriented X 1-2 Psych:   poor insight. Not anxious nor agitated Skin:  No rashes. No jaundice Reviewed most current lab test results and cultures  YES Reviewed most current radiology test results   YES Review and summation of old records today    NO Reviewed patient's current orders and MAR    YES 
PMH/SH reviewed - no change compared to H&P 
________________________________________________________________________ Care Plan discussed with: 
  Comments Patient x Family RN Care Manager Consultant Multidiciplinary team rounds were held today with , nursing, pharmacist and clinical coordinator. Patient's plan of care was discussed; medications were reviewed and discharge planning was addressed. ________________________________________________________________________ Total NON critical care TIME:  30  Minutes Total CRITICAL CARE TIME Spent:   Minutes non procedure based Comments >50% of visit spent in counseling and coordination of care    
________________________________________________________________________ Rajiv Moore MD  
 
Procedures: see electronic medical records for all procedures/Xrays and details which were not copied into this note but were reviewed prior to creation of Plan. LABS: 
I reviewed today's most current labs and imaging studies. Pertinent labs include: No results for input(s): WBC, HGB, HCT, PLT, HGBEXT, HCTEXT, PLTEXT, HGBEXT, HCTEXT, PLTEXT in the last 72 hours. No results for input(s): NA, K, CL, CO2, GLU, BUN, CREA, CA, MG, PHOS, ALB, TBIL, TBILI, ALT, INR, INREXT, INREXT in the last 72 hours. No lab exists for component: SGOT Signed: Rajiv Moore MD

## 2021-05-30 NOTE — PROGRESS NOTES
End of Shift Note Bedside shift change report given to Rina [facility] (oncoming nurse) by Grant Sr (offgoing nurse). Report included the following information SBAR, Kardex, Intake/Output, MAR, Accordion and Recent Results Shift worked:  7-7p Shift summary and any significant changes: PRN pain medication given x2. Wounds redressed and offloaded. IV infiltrated. MD okay with no IV. Concerns for physician to address:  Hospice coordination Zone phone for oncoming shift:   [phone]

## 2021-05-30 NOTE — PROGRESS NOTES
Hospitalist Progress Note NAME: Arpita French :  1940 MRN:  [id number] Assessment / Plan: 
Recurrent wide complex tachycardia episodes New atrial fibrillation  ,PAF Hypomagnesemia 1.7, resolved 
 
 
-Initially was on IV amiodarone, switched to p.o. amiodarone 
-Echocardiogram with EF of 35 to 40%, which is reduced from prior 
-Keep potassium greater than 4 and magnesium greater than 2 
-P. o. metoprolol 
 
 
  
Concern for CAP vs aspiration pneumonitis Dehydration Concern for sepsis  (band forms 9%, tachycardia, tachypnea, worsening cognition, initially per nursing staff was responding to pain) Acute metabolic encephalopathy, likely from pneumonia, with underlying CVA 
  
CT brain Unchanged extensive encephalomalacia in the right posterior cerebral 
hemisphere and bilateral cerebellum. 
  
CXR 1. Right-sided calcified pleural plaques. Superimposed mild heterogeneous 
opacities in the right lower lung, as well as in the left mid and lower lungs, 
which may represent edema, atelectasis and/or infection. Urinalysis appears bland 
  
-Initially on IV antibiotics, switched to p.o. doxycycline 
 
 
  
Physical deconditioning Recent left hip fracture Parkinson's disease PT and OT evaluation Case management to assist with discharge Continue Sinemet 
 
-left Hip xray done, was limited. Showed postoperative changes, no significant acute abnormality 
  
  
History of multiple CVAs HLD Can not rule new insult (deteriorating functionality/swallowing and speech) Continue aspirin and statin  consult neurology 
 
  
  
  
Hypernatremia, improved Dehydration CKD stage III Appears stable Continue IVF Code Status: DNR Surrogate Decision Maker: Life partner 
  
DVT Prophylaxis: enoxaparin GI Prophylaxis: not indicated 
  
Baseline: bed bound for the past month Appreciate palliative team's and hospice team help.   Plan for home with hospice, hopefully should be able to go in next day or so. Medically stable for DC to Hospice Subjective: Chief Complaint / Reason for Physician Visit No events overnight Review of Systems: 
Symptom Y/N Comments  Symptom Y/N Comments Fever/Chills n   Chest Pain n   
Poor Appetite n   Edema Cough n   Abdominal Pain Sputum    Joint Pain SOB/MARTINEZ    Pruritis/Rash Nausea/vomit    Tolerating PT/OT Diarrhea    Tolerating Diet Constipation    Other Could NOT obtain due to:   
 
Objective: VITALS:  
Last 24hrs VS reviewed since prior progress note. Most recent are: 
Patient Vitals for the past 24 hrs: 
 Temp Pulse Resp BP SpO2  
05/30/21 1507 98 °F (36.7 °C) 64 15 (!) 155/77 96 % 05/30/21 1115 98.4 °F (36.9 °C) 65 16 (!) 143/69 95 % 05/30/21 0752 98.2 °F (36.8 °C) 76 17 (!) 176/84 94 % 05/30/21 0330 97.8 °F (36.6 °C) 74 16 (!) 142/66 95 % 05/29/21 2223 98.1 °F (36.7 °C) 76 17 135/76 94 % 05/29/21 1928 98.3 °F (36.8 °C) 73 16 127/65 93 % No intake or output data in the 24 hours ending 05/30/21 1747 I had a face to face encounter and independently examined this patient on 5/30/2021, as outlined below: PHYSICAL EXAM: 
General: WD, WN. Alert, cooperative, no acute distress EENT:  EOMI. Anicteric sclerae. MMM Resp:  CTA bilaterally, no wheezing or rales. No accessory muscle use CV:  Regular  rhythm,  No edema GI:  Soft, Non distended, Non tender. +Bowel sounds Neurologic:  Alert and oriented X 1-2 Psych:   poor insight. Not anxious nor agitated Skin:  No rashes. No jaundice Reviewed most current lab test results and cultures  YES Reviewed most current radiology test results   YES Review and summation of old records today    NO Reviewed patient's current orders and MAR    YES 
PMH/SH reviewed - no change compared to H&P 
________________________________________________________________________ Care Plan discussed with: 
  Comments Patient x Family RN Care Manager Consultant Multidiciplinary team rounds were held today with , nursing, pharmacist and clinical coordinator. Patient's plan of care was discussed; medications were reviewed and discharge planning was addressed. ________________________________________________________________________ Total NON critical care TIME:  30  Minutes Total CRITICAL CARE TIME Spent:   Minutes non procedure based Comments >50% of visit spent in counseling and coordination of care    
________________________________________________________________________ Yfn Silva MD  
 
Procedures: see electronic medical records for all procedures/Xrays and details which were not copied into this note but were reviewed prior to creation of Plan. LABS: 
I reviewed today's most current labs and imaging studies. Pertinent labs include: No results for input(s): WBC, HGB, HCT, PLT, HGBEXT, HCTEXT, PLTEXT, HGBEXT, HCTEXT, PLTEXT in the last 72 hours. No results for input(s): NA, K, CL, CO2, GLU, BUN, CREA, CA, MG, PHOS, ALB, TBIL, TBILI, ALT, INR, INREXT, INREXT in the last 72 hours. No lab exists for component: SGOT Signed: Yfn Silva MD

## 2021-05-30 NOTE — PROGRESS NOTES
Bedside and Verbal shift change report given to [id number] (oncoming nurse) by Mick Whittington RN (offgoing nurse). Report included the following information SBAR, Kardex, Intake/Output, MAR, Accordion, Recent Results and Med Rec Status.

## 2021-05-31 NOTE — HOSPICE
[facility] 
[phone] Nursing Note Patient Name: Bonnie Romero YOB: 1940 Age: 79 y.o. [facility] RN Note:   
 
Spoke with Melody Duncan/life-partner/AYO to find out progress of having present DMEs picked up so that [id number] can deliver hospice DMEs. Christelle Rowland stated that DMEs have not been picked up. Christelle Rowland said she doesnt want DMEs picked up before Tuesday. Will contact Melody tomorrow to see when hospice DMEs can be delivered. Anticipate d/c either Tuesday afternoon or Wednesday. Will continue to update.

## 2021-05-31 NOTE — PROGRESS NOTES
Hospitalist Progress Note NAME: Paulette Yañez :  1940 MRN:  [id number] Assessment / Plan: 
Recurrent wide complex tachycardia episodes New atrial fibrillation  ,PAF Hypomagnesemia 1.7, resolved 
 
 
-Initially was on IV amiodarone, switched to p.o. amiodarone 
-Echocardiogram with EF of 35 to 40%, which is reduced from prior 
-Keep potassium greater than 4 and magnesium greater than 2 
-P. o. metoprolol 
 
 
  
Concern for CAP vs aspiration pneumonitis Dehydration Concern for sepsis  (band forms 9%, tachycardia, tachypnea, worsening cognition, initially per nursing staff was responding to pain) Acute metabolic encephalopathy, likely from pneumonia, with underlying CVA 
  
CT brain Unchanged extensive encephalomalacia in the right posterior cerebral 
hemisphere and bilateral cerebellum. 
  
CXR 1. Right-sided calcified pleural plaques. Superimposed mild heterogeneous 
opacities in the right lower lung, as well as in the left mid and lower lungs, 
which may represent edema, atelectasis and/or infection. Urinalysis appears bland 
  
-Initially on IV antibiotics, switched to p.o. doxycycline 
 
 
  
Physical deconditioning Recent left hip fracture Parkinson's disease PT and OT evaluation Case management to assist with discharge Continue Sinemet 
 
-left Hip xray done, was limited. Showed postoperative changes, no significant acute abnormality 
  
  
History of multiple CVAs HLD Can not rule new insult (deteriorating functionality/swallowing and speech) Continue aspirin and statin  consult neurology 
 
  
  
  
Hypernatremia, improved Dehydration CKD stage III Appears stable Code Status: DNR Surrogate Decision Maker: Life partner 
  
DVT Prophylaxis: enoxaparin GI Prophylaxis: not indicated 
  
Baseline: bed bound for the past month Appreciate palliative team's and hospice team help.   Plan for home with hospice, hopefully should be able to go in next day or so. Medically stable for DC to Hospice Subjective: Chief Complaint / Reason for Physician Visit No events overnight Review of Systems: 
Symptom Y/N Comments  Symptom Y/N Comments Fever/Chills n   Chest Pain n   
Poor Appetite n   Edema Cough n   Abdominal Pain Sputum    Joint Pain SOB/MARTINEZ    Pruritis/Rash Nausea/vomit    Tolerating PT/OT Diarrhea    Tolerating Diet Constipation    Other Could NOT obtain due to:   
 
Objective: VITALS:  
Last 24hrs VS reviewed since prior progress note. Most recent are: 
Patient Vitals for the past 24 hrs: 
 Temp Pulse Resp BP SpO2  
05/31/21 1119 98.7 °F (37.1 °C) 64 16 130/62 99 % 05/31/21 0743 98.7 °F (37.1 °C) 80 16 (!) 159/77 97 % 05/31/21 0540 98 °F (36.7 °C)      
05/31/21 0248 (!) 96.7 °F (35.9 °C) 71 16 127/65 96 % 05/30/21 2230 98.1 °F (36.7 °C) 64 17 134/64 95 % 05/30/21 1910 98.1 °F (36.7 °C) 67 16 (!) 143/75 96 % 05/30/21 1507 98 °F (36.7 °C) 64 15 (!) 155/77 96 % Intake/Output Summary (Last 24 hours) at 5/31/2021 1450 Last data filed at 5/31/2021 2554 Gross per 24 hour Intake 200 ml Output 150 ml Net 50 ml I had a face to face encounter and independently examined this patient on 5/31/2021, as outlined below: PHYSICAL EXAM: 
General: WD, WN. Alert, cooperative, no acute distress EENT:  EOMI. Anicteric sclerae. MMM Resp:  CTA bilaterally, no wheezing or rales. No accessory muscle use CV:  Regular  rhythm,  No edema GI:  Soft, Non distended, Non tender. +Bowel sounds Neurologic:  Alert and oriented X 1-2 Psych:   poor insight. Not anxious nor agitated Skin:  No rashes. No jaundice Reviewed most current lab test results and cultures  YES Reviewed most current radiology test results   YES Review and summation of old records today    NO Reviewed patient's current orders and MAR    YES 
PMH/SH reviewed - no change compared to H&P ________________________________________________________________________ Care Plan discussed with: 
  Comments Patient x Family RN Care Manager Consultant Multidiciplinary team rounds were held today with , nursing, pharmacist and clinical coordinator. Patient's plan of care was discussed; medications were reviewed and discharge planning was addressed. ________________________________________________________________________ Total NON critical care TIME:  30  Minutes Total CRITICAL CARE TIME Spent:   Minutes non procedure based Comments >50% of visit spent in counseling and coordination of care    
________________________________________________________________________ Eva Carrion MD  
 
Procedures: see electronic medical records for all procedures/Xrays and details which were not copied into this note but were reviewed prior to creation of Plan. LABS: 
I reviewed today's most current labs and imaging studies. Pertinent labs include: No results for input(s): WBC, HGB, HCT, PLT, HGBEXT, HCTEXT, PLTEXT, HGBEXT, HCTEXT, PLTEXT in the last 72 hours. No results for input(s): NA, K, CL, CO2, GLU, BUN, CREA, CA, MG, PHOS, ALB, TBIL, TBILI, ALT, INR, INREXT, INREXT in the last 72 hours. No lab exists for component: SGOT Signed: Eva Carrion MD

## 2021-05-31 NOTE — PROGRESS NOTES
End of Shift Note Bedside shift change report given to Yelena Valentine RN (oncoming nurse) by Michel Rizvi RN (offgoing nurse). Report included the following information SBAR, Kardex, Intake/Output, MAR, Accordion, Recent Results and Med Rec Status Shift worked:  7PM-7AM 
  
Shift summary and any significant changes:  
  Pain medication given X1 for left leg. No BM. No IV access Concerns for physician to address:   
  
Zone phone for oncoming shift:    
  
 
Michel Rizvi RN

## 2021-05-31 NOTE — PROGRESS NOTES
End of Shift Note Bedside shift change report given to Robert Yoder RN (oncoming nurse) by Caitie Cage (offgoing nurse). Report included the following information SBAR, Kardex, Intake/Output, MAR and Recent Results Shift worked:  7440-5036 Shift summary and any significant changes:  
  None significant Concerns for physician to address:   
  
Zone phone for oncoming shift:    
  
 
Activity: 
Activity Level: Bed Rest 
Number times ambulated in hallways past shift: 0 Number of times OOB to chair past shift: 0 Cardiac:  
Cardiac Monitoring: Yes     
Cardiac Rhythm: Sinus Rhythm Access:  
Current line(s): no access Genitourinary:  
Urinary status: incontinent Respiratory:  
O2 Device: None (Room air) Chronic home O2 use?: NO Incentive spirometer at bedside: NO 
  
GI: 
Last Bowel Movement Date:  (unknown) Current diet:  DIET DYSPHAGIA PUREED (NDD1) Passing flatus: Tolerating current diet: YES Pain Management:  
Patient states pain is manageable on current regimen: N/A Skin: 
Florentin Score: 11 Interventions: turn team, float heels, increase time out of bed, PT/OT consult, limit briefs and nutritional support Patient Safety: 
Fall Score: Total Score: 3 Interventions: bed/chair alarm, gripper socks and stay with me (per policy) High Fall Risk: Yes Length of Stay: 
Expected LOS: 3d 12h Actual LOS: 7 Caitie Cage

## 2021-06-01 NOTE — ROUTINE PROCESS
1042 am: attempted to call health care decision maker Nico Lay with both phone numbers listed. No answer will call back. Atttempting to fill out admission database.

## 2021-06-01 NOTE — PROGRESS NOTES
End of Shift Note Bedside shift change report given to JACK Rueda (oncoming nurse) by Lanie Avalos RN (offgoing nurse). Report included the following information SBAR, Kardex, ED Summary, Intake/Output, MAR, Recent Results and Med Rec Status Shift worked: 5a-7a Shift summary and any significant changes:  
   
  
Concerns for physician to address:   
  
Zone phone for oncoming shift:   [phone] Activity: 
Activity Level: Bed Rest 
Number times ambulated in hallways past shift: 0 Number of times OOB to chair past shift: 0 Cardiac:  
Cardiac Monitoring: No     
Cardiac Rhythm: Sinus Rhythm Access:  
Current line(s): PIV Genitourinary:  
Urinary status: incontinent Respiratory:  
O2 Device: None (Room air) Chronic home O2 use?: NO Incentive spirometer at bedside: NO 
  
GI: 
Last Bowel Movement Date:  (unknown) Current diet:  DIET DYSPHAGIA PUREED (NDD1) Passing flatus: Tolerating current diet: YES Pain Management:  
Patient states pain is manageable on current regimen: YES Skin: 
Florentin Score: 11 Interventions: float heels and foam dressing Patient Safety: 
Fall Score: Total Score: 3 Interventions: bed/chair alarm, assistive device (walker, cane, etc) and gripper socks High Fall Risk: Yes Length of Stay: 
Expected LOS: 3d 12h Actual LOS: 8 Lanie Avalos RN

## 2021-06-01 NOTE — PROGRESS NOTES
Problem: Falls - Risk of 
Goal: *Absence of Falls Description: Document Fallon Waller Fall Risk and appropriate interventions in the flowsheet. Outcome: Progressing Towards Goal 
Note: Fall Risk Interventions: 
Mobility Interventions: Bed/chair exit alarm, Communicate number of staff needed for ambulation/transfer, Patient to call before getting OOB, Utilize walker, cane, or other assistive device Mentation Interventions: Bed/chair exit alarm, Door open when patient unattended, More frequent rounding, Reorient patient, Room close to nurse's station, Toileting rounds, Update white board Medication Interventions: Bed/chair exit alarm, Patient to call before getting OOB, Teach patient to arise slowly Elimination Interventions: Bed/chair exit alarm, Call light in reach, Patient to call for help with toileting needs, Toileting schedule/hourly rounds

## 2021-06-01 NOTE — PROGRESS NOTES
Hospitalist Progress Note NAME: Kory Galindo :  1940 MRN:  [id number] Assessment / Plan: 
Recurrent wide complex tachycardia episodes New atrial fibrillation  ,PAF Hypomagnesemia 1.7, resolved 
 
 
-Initially was on IV amiodarone, switched to p.o. amiodarone 
-Echocardiogram with EF of 35 to 40%, which is reduced from prior 
-Keep potassium greater than 4 and magnesium greater than 2 
-P. o. metoprolol 
 
 
  
Concern for CAP vs aspiration pneumonitis Dehydration Concern for sepsis  (band forms 9%, tachycardia, tachypnea, worsening cognition, initially per nursing staff was responding to pain) Acute metabolic encephalopathy, likely from pneumonia, with underlying CVA 
  
CT brain Unchanged extensive encephalomalacia in the right posterior cerebral 
hemisphere and bilateral cerebellum. 
  
CXR 1. Right-sided calcified pleural plaques. Superimposed mild heterogeneous 
opacities in the right lower lung, as well as in the left mid and lower lungs, 
which may represent edema, atelectasis and/or infection. Urinalysis appears bland 
  
-Initially on IV antibiotics, switched to p.o. doxycycline 
 
 
  
Physical deconditioning Recent left hip fracture Parkinson's disease PT and OT evaluation Case management to assist with discharge Continue Sinemet 
 
-left Hip xray done, was limited. Showed postoperative changes, no significant acute abnormality 
  
  
History of multiple CVAs HLD Can not rule new insult (deteriorating functionality/swallowing and speech) Continue aspirin and statin  consult neurology 
 
  
  
  
Hypernatremia, improved Dehydration CKD stage III Appears stable Dispositionpatient medically stable for discharge. Pending placementhome with hospice with required DME. Code Status: DNR Surrogate Decision Maker: Life partner 
  
DVT Prophylaxis: enoxaparin GI Prophylaxis: not indicated 
  
Baseline: bed bound for the past month Subjective: Chief Complaint / Reason for Physician Visit Patient resting comfortably on the bed, no acute events overnight. Review of Systems: 
Symptom Y/N Comments  Symptom Y/N Comments Fever/Chills n   Chest Pain n   
Poor Appetite n   Edema Cough n   Abdominal Pain Sputum    Joint Pain SOB/MARTINEZ    Pruritis/Rash Nausea/vomit    Tolerating PT/OT Diarrhea    Tolerating Diet Constipation    Other Could NOT obtain due to:   
 
Objective: VITALS:  
Last 24hrs VS reviewed since prior progress note. Most recent are: 
Patient Vitals for the past 24 hrs: 
 Temp Pulse Resp BP SpO2  
06/01/21 1106 97.8 °F (36.6 °C) 65 16 (!) 160/71 98 % 06/01/21 0750 97.7 °F (36.5 °C) 66 18 (!) 151/64 100 % 06/01/21 0531 98.4 °F (36.9 °C) 72 18 (!) 158/74 100 % 06/01/21 0401 99.8 °F (37.7 °C) 72 18 (!) 166/72 100 % 05/31/21 1944 98.2 °F (36.8 °C) 65 18 139/66 98 % 05/31/21 1547 98.7 °F (37.1 °C) 60 16 (!) 123/52 98 % Intake/Output Summary (Last 24 hours) at 6/1/2021 1517 Last data filed at 6/1/2021 2166 Gross per 24 hour Intake 1662.5 ml Output  Net 1662.5 ml I had a face to face encounter and independently examined this patient on 6/1/2021, as outlined below: PHYSICAL EXAM: 
General: WD, WN. Alert, cooperative, no acute distress EENT:  EOMI. Anicteric sclerae. MMM Resp:  CTA bilaterally, no wheezing or rales. No accessory muscle use CV:  Regular  rhythm,  No edema GI:  Soft, Non distended, Non tender. +Bowel sounds Neurologic:  Alert and oriented X 1-2 Psych:   poor insight. Not anxious nor agitated Skin:  No rashes. No jaundice Reviewed most current lab test results and cultures  YES Reviewed most current radiology test results   YES Review and summation of old records today    NO Reviewed patient's current orders and MAR    YES 
PMH/SH reviewed - no change compared to H&P 
________________________________________________________________________ Care Plan discussed with: 
  Comments Patient x Family RN X   
Care Manager Consultant Multidiciplinary team rounds were held today with , nursing, pharmacist and clinical coordinator. Patient's plan of care was discussed; medications were reviewed and discharge planning was addressed. ________________________________________________________________________ Total NON critical care TIME:  36  Minutes Total CRITICAL CARE TIME Spent:   Minutes non procedure based Comments >50% of visit spent in counseling and coordination of care    
________________________________________________________________________ Anyi Allred MD  
 
Procedures: see electronic medical records for all procedures/Xrays and details which were not copied into this note but were reviewed prior to creation of Plan. LABS: 
I reviewed today's most current labs and imaging studies. Pertinent labs include: No results for input(s): WBC, HGB, HCT, PLT, HGBEXT, HCTEXT, PLTEXT, HGBEXT, HCTEXT, PLTEXT in the last 72 hours. No results for input(s): NA, K, CL, CO2, GLU, BUN, CREA, CA, MG, PHOS, ALB, TBIL, TBILI, ALT, INR, INREXT, INREXT in the last 72 hours. No lab exists for component: SGOT Signed: Anyi Allred MD

## 2021-06-01 NOTE — HOSPICE
[facility] Liaison note: 
 
Received return call from Aaron Hodgson, life partner of patient. Old equipment has been picked up today from [facility]. New equipment order for hospice has been placed through [address] '[address] for delivery today, equipment includes : hospital bed with full rails, OBT, and 5 liter oxygen setup. [facility] will reach out to [facility] [place] [facility] for ETA on delivery today. Confirmation number is [id number]. Thank you, 
 
Syed Hong RN [facility] [phone] Mobile

## 2021-06-01 NOTE — PROGRESS NOTES
Messaged ROSALVA Plasencia NP about telemetry order. Received message back stating that pt does not need to be placed on tele. Bedside and Verbal shift change report given to JACK Rueda (oncoming nurse) by Lottie Borja (offgoing nurse). Report included the following information SBAR, Kardex, ED Summary, Procedure Summary, Intake/Output, MAR, Recent Results and Med Rec Status.

## 2021-06-01 NOTE — HOSPICE
[facility] Liaison note: 
 
Left message for Tyra Hernandes to discuss DME and discharge planning. Will wait return telephone call. Thank you, 
 
Luis Beebe Rn 
[facility] [phone] Mobile

## 2021-06-01 NOTE — PROGRESS NOTES
TRANSFER - OUT REPORT: 
 
Verbal report given to Peg (name) on Kathi Valdez  being transferred to Ortho (unit) for routine progression of care Report consisted of patients Situation, Background, Assessment and  
Recommendations(SBAR). Information from the following report(s) Kardex and Intake/Output was reviewed with the receiving nurse. Lines:    
 
Opportunity for questions and clarification was provided. Patient transported with: 
 Registered Nurse Tech

## 2021-06-01 NOTE — PROGRESS NOTES
End of Shift Note Bedside shift change report given to Sg Thakur (oncoming nurse) by Rony Molina (offgoing nurse). Report included the following information SBAR, Kardex, Intake/Output, MAR and Recent Results Shift worked:  [id number] Shift summary and any significant changes:  
  None significant. Patient cleaned and vitals stable. Waiting for all home needs to be available for hospice care at home. Concerns for physician to address:   
none Zone phone for oncoming shift:    
4350 Activity: 
Activity Level: Bed Rest 
Number times ambulated in hallways past shift: 0 Number of times OOB to chair past shift: 0 Cardiac:  
Cardiac Monitoring: Yes     
Cardiac Rhythm: Sinus Rhythm Access:  
Current line(s): no access Genitourinary:  
Urinary status: incontinent Respiratory:  
O2 Device: None (Room air) Chronic home O2 use?: NO Incentive spirometer at bedside: NO 
  
GI: 
Last Bowel Movement Date:  (unknown) Current diet:  DIET DYSPHAGIA PUREED (NDD1) Passing flatus: Tolerating current diet: YES Pain Management:  
Patient states pain is manageable on current regimen: N/A Skin: 
Florentin Score: 11 Interventions: turn team, float heels, increase time out of bed, PT/OT consult, limit briefs and nutritional support Patient Safety: 
Fall Score: Total Score: 3 Interventions: bed/chair alarm, gripper socks and stay with me (per policy) High Fall Risk: Yes Length of Stay: 
Expected LOS: 3d 12h Actual LOS: 8 Rony Molina

## 2021-06-01 NOTE — PROGRESS NOTES
Transition of Care Plan: 
  
RUR:  14% 
  
Disposition: Home with friend and caregivers with [employer]   
Follow up appointments: To be done prior to discharge.  
  
DME needed: To be determined.  
  
Transportation at Discharge: Friend vs BLS 
  
[place] or means to access home:   His friend has the key to the home.  
    
IM Medicare letter: To be given prior to discharge.  
  
Caregiver Contact: Melody Duncan (friend--life time partner--[phone] 
  
Discharge Caregiver contacted prior to discharge?    Caregiver to be contacted prior to discharge.  
  
 
 
Per chart review, Pt to discharge home with [employer]. Awaiting hospice DME delivery at this time. CM will continue to follow for discharge planning. Jacobo 45, BS, [address]

## 2021-06-02 NOTE — PROGRESS NOTES
Unable to get am labs, Pt also had pulled IV line. PICC team called concerning AM labs. Oncoming staff made aware.

## 2021-06-02 NOTE — PROGRESS NOTES
Transition of Care Plan: 
  
RUR:  14% 
  
Disposition: Home with friend and caregivers with [employer]   
Follow up appointments: To be done prior to discharge.  
  
DME needed: To be determined.  
  
Transportation at Discharge: Friend vs BLS 
  
[place] or means to access home:   His friend has the key to the home.  
    
IM Medicare letter: To be given prior to discharge.  
  
Caregiver Contact: Melody Duncan (friend--life time partner--[phone] 
  
Discharge Caregiver contacted prior to discharge?    Caregiver to be contacted prior to discharge.  
  
 
 
CM notified by hospice to arrange [facility] transport for 3pm. CM sent request to [facility] via [employer]. Awaiting confirmation. Jacobo 45, BS, [address] [facility]

## 2021-06-02 NOTE — PROGRESS NOTES
Dr Chuck John aware that patient pulled out line last night. MD said we do not need to place new line or obtain labs since patient is a DNR and discharging.

## 2021-06-02 NOTE — WOUND CARE
Wound care nurse consult for left heel, foot, ankle and hip pressure injuries that are present on admission. Patient is going home today on [address] [facility] with [facility]. The wounds are filled with either macerated slough or eschar and there is a scar on the left hip with a DTI that was POA. Treatment recommendation for comfort care: Daily Cleanse the wounds on the left side with Betadine swabs and allow it to dry. May apply dressings until the wounds are completely dry / non-draining. This was done today and new foam dressings applied since we were cleaning him up form a stool anyway.   
Gabriela Alberto RN, BSN, [address] [facility]

## 2021-06-02 NOTE — DISCHARGE INSTRUCTIONS
HOSPITALIST DISCHARGE INSTRUCTIONS    NAME: Immanuel Barber   :  1940   MRN:  [id number]     Date/Time:  2021 1:28 PM    ADMIT DATE: 2021   DISCHARGE DATE: 2021     Attending Physician: Linda Thomson MD    DISCHARGE DIAGNOSIS:  Recurrent wide complex tachycardia episodes  New atrial fibrillation  ,PAF  Hypomagnesemia  CAP  Dehydration  Sepsis  (band forms 9%, tachycardia, tachypnea, worsening cognition, initially per nursing staff was responding to pain)  Acute metabolic encephalopathy,   Dehydration  Physical deconditioning  Recent left hip fracture  Parkinson's disease  History of multiple CVAs   HLD   Hypernatremia, improved  Dehydration  CKD stage III    MEDICATIONS:  See above    · It is important that you take the medication exactly as they are prescribed. · Keep your medication in the bottles provided by the pharmacist and keep a list of the medication names, dosages, and times to be taken in your wallet. · Do not take other medications without consulting your doctor. Pain Management: per above medications    What to do at Home    Recommended diet:  Regular Diet    Recommended activity: Activity as tolerated    If you have questions regarding the hospital related prescriptions or hospital related issues please call [facility] at . You can always direct your questions to your primary care doctor if you are unable to reach your hospital physician; your PCP works as an extension of your hospital doctor just like your hospital doctor is an extension of your PCP for your time at [address] [address] [facility].     If you experience any of the following symptoms then please call your primary care physician or return to the emergency room if you cannot get hold of your doctor:  Fever, chills, nausea, vomiting, diarrhea, change in mentation, falling, bleeding, shortness of breath    Additional Instructions:      Bring these papers with you to your follow up appointments. The papers will help your doctors be sure to continue the care plan from the hospital.              Information obtained by :  I understand that if any problems occur once I am at home I am to contact my physician. I understand and acknowledge receipt of the instructions indicated above.                                                                                                                                            Physician's or R.N.'s Signature                                                                  Date/Time                                                                                                                                              Patient or Representative Signature                                                          Da

## 2021-06-02 NOTE — HOSPICE
[facility]  [facility]. DME being delivered this morning. Message left with CM to confirm plan for discharge. Hospice able to admit this afternoon at 4:00pm.   
 
Thank you for the opportunity to care for this pt and family. Please contact hospice at [phone] with any questions or concerns.

## 2021-06-02 NOTE — PROGRESS NOTES
DISCHARGE NOTE FROM [facility] Patient determined to be stable for discharge by attending provider. I have reviewed the discharge instructions with the other hospice is coming to patients home . They verbalized understanding and all questions were answered to their satisfaction. No complaints or further questions were expressed. Medications sent to pharmacy. Appropriate educational materials and medication side effect teaching were provided. PIV were removed prior to discharge. Patient did not discharge with any line, duran, or drain. Personal items and valuables accounted for at discharge by patient and/or family: Precious Beckham Post-op patient: No 
 
Medication provided by hospice sent with patient as well Sommer Whelan

## 2021-06-02 NOTE — PROGRESS NOTES
Attempted to call hospice multiple times in order to update arrival time of patient. No answer but two messages left.

## 2021-06-04 NOTE — HOSPICE
LMSW arrived at the patient's home to complete an Initial Psychosocial Assessment for [facility]. The LMSW was greeted at the front door by the patient's wife Yessy Soliz and her DTR Forrestine Dyllan was present. The patient was a 78 y/o [place] American male with a [facility] Dx. 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 The patient was received sleeping on his bed comfortably upon arrival. Raziasavanah Soliz was the POC for this visit. 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 Patient was never  but has a Cathren Cook who lives in [place], [place]. And a son Todd Singleton who lives in [place]. Taylor Marie stays in touch with the patient. Song does not. Patient does not have any siblings. Patient belongs to the [facility] and they receive support in the form of cards. Patient was bedbound and total care. Patient was alert and oriented to self only. Patient's appetite was poor. Yessy Soliz was the primary caregiver with assistance from Forrestine Dyllan when needed. LMSW provided community resources, emotional support and assist with EOL issues.

## 2021-06-09 ENCOUNTER — HOME CARE VISIT (OUTPATIENT)
Dept: HOSPICE | Facility: HOSPICE | Age: 81
End: 2021-06-09
Payer: MEDICARE

## 2022-03-03 NOTE — HOSPICE
[facility] to Those in Need 
[phone] Patient Name: Shani Wallace YOB: 1940 Age: 79 y.o. [facility] RN Note:  Hospice consult received, reviewing chart. Will follow up with Unit Nurse and Care Manager to discuss plan of care, patient status and discharge disposition within the hour. Thank you for the opportunity to be of service to this patient. Lizbeth Henry RN Clinical Nurse Liaison [facility] R)[phone] General

## 2023-01-20 NOTE — PROGRESS NOTES
Occupational Therapy note: 
 
Pt has decided to discharge home with hospice care. Will sign off at this time.  
 
Bishnu Coley OTR/L 
 VTE Pharmacologic Prophylaxis: VTE Score: 5 {VTE Risk:18887}    Patient Centered Rounds: {Pt Centered Care Rounds:24841}  Discussions with Specialists or Other Care Team Provider: ***    Education and Discussions with Family / Patient: {Family Communication:82626}    Time Spent for Care: {Note Time:12591}  More than 50% of total time spent on counseling and coordination of care as described above  Current Length of Stay: 6 day(s)  Current Patient Status: Inpatient   Certification Statement: {Certification [facility]:39328}  Discharge Plan: {Discharge VUDT:85906}    Code Status: Level 1 - Full Code    Subjective:   ***    Objective:     Vitals:   Temp (24hrs), Av 9 °F (36 6 °C), Min:97 8 °F (36 6 °C), Max:98 2 °F (36 8 °C)    Temp:  [97 8 °F (36 6 °C)-98 2 °F (36 8 °C)] 98 2 °F (36 8 °C)  HR:  [71-74] 71  Resp:  [16] 16  BP: (151-158)/(61-62) 158/62  SpO2:  [95 %-97 %] 97 %  Body mass index is 26 86 kg/m²  Input and Output Summary (last 24 hours): Intake/Output Summary (Last 24 hours) at 2023 0850  Last data filed at 2023 1801  Gross per 24 hour   Intake --   Output 700 ml   Net -700 ml       Physical Exam:   Physical Exam ***    Additional Data:     Labs:  Results from last 7 days   Lab Units 23  0620 01/15/23  0607 23  0606   WBC Thousand/uL 8 87   < > 10 47*   HEMOGLOBIN g/dL 13 8   < > 13 1   HEMATOCRIT % 40 3   < > 39 3   PLATELETS Thousands/uL 248   < > 175   NEUTROS PCT %  --   --  84*   LYMPHS PCT %  --   --  10*   MONOS PCT %  --   --  4   EOS PCT %  --   --  0    < > = values in this interval not displayed       Results from last 7 days   Lab Units 23  0620 23  0902 01/15/23  0607   SODIUM mmol/L 136   < > 142   POTASSIUM mmol/L 3 7   < > 3 4*   CHLORIDE mmol/L 98   < > 102   CO2 mmol/L 26   < > 32   BUN mg/dL 14   < > 14   CREATININE mg/dL 0 67   < > 1 01   ANION GAP mmol/L 12   < > 8   CALCIUM mg/dL 9 0   < > 8 7   ALBUMIN g/dL  --   --  2 6*   TOTAL BILIRUBIN mg/dL  --   --  0 70   ALK PHOS U/L  --   --  40*   ALT U/L  --   --  14   AST U/L  --   --  18   GLUCOSE RANDOM mg/dL 106   < > 98    < > = values in this interval not displayed  Results from last 7 days   Lab Units 01/14/23  0606   LACTIC ACID mmol/L 2 0       Lines/Drains:  Invasive Devices     Peripheral Intravenous Line  Duration           Peripheral IV 01/15/23 Left Antecubital 4 days          Drain  Duration           External Urinary Catheter 4 days                      Imaging: {Radiology Review:17906}    Recent Cultures (last 7 days):   Results from last 7 days   Lab Units 01/14/23  0646 01/14/23  0606   BLOOD CULTURE  No Growth After 5 Days  No Growth After 5 Days  Last 24 Hours Medication List:   Current Facility-Administered Medications   Medication Dose Route Frequency Provider Last Rate   • acetaminophen  650 mg Oral Q6H PRN Yayo Zambrano MD     • amLODIPine  5 mg Oral Daily Yayo Zambrano MD     • cefTRIAXone  1,000 mg Intravenous Q24H Yayo Zambrano MD 1,000 mg (01/19/23 1236)   • diphenhydrAMINE  25 mg Oral HS PRN Yayo Zambrano MD     • enoxaparin  30 mg Subcutaneous Daily Yayo Zambrano MD     • famotidine  20 mg Intravenous HS Yayo Zambrano MD     • levothyroxine  75 mcg Oral Once per day on Sun Tue Thu Fri Sat Yayo Zambrano MD     • meclizine  25 mg Oral [facility] Yayo Zambrano MD     • metoclopramide  5 mg Intravenous Q6H PRN Afshan Georges PA-C     • metroNIDAZOLE  500 mg Intravenous Q8H Yayo Zambrano  mg (01/20/23 0144)   • nebivolol  5 mg Oral QA Yayo Zambrano MD     • ondansetron  4 mg Intravenous Q6H PRN Afshan Georges PA-C     • pantoprazole  40 mg Intravenous Q24H Milan Rivera MD     • pravastatin  20 mg Oral Daily With Anuel Florence MD          Today, Patient Was Seen By: Silvina Avalos    **Please Note: This note may have been constructed using a voice recognition system  **

## (undated) DEVICE — SYR 3ML LL TIP 1/10ML GRAD --

## (undated) DEVICE — SYR ASSEMB INFL BLLN 60ML --

## (undated) DEVICE — Device

## (undated) DEVICE — STERILE POLYISOPRENE POWDER-FREE SURGICAL GLOVES WITH EMOLLIENT COATING: Brand: PROTEXIS

## (undated) DEVICE — SUTURE VCRL SZ 1 L36IN ABSRB UD L36MM CT-1 1/2 CIR J947H

## (undated) DEVICE — 3M™ TEGADERM™ TRANSPARENT FILM DRESSING FRAME STYLE, 1626W, 4 IN X 4-3/4 IN (10 CM X 12 CM), 50/CT 4CT/CASE: Brand: 3M™ TEGADERM™

## (undated) DEVICE — BAG SPEC BIOHZRD 10 X 10 IN --

## (undated) DEVICE — BIT DRL L330MM DIA4.2MM CALIB 100MM 3 FLUT QUIK CPL

## (undated) DEVICE — ESOPHAGEAL BALLOON DILATATION CATHETER: Brand: CRE FIXED WIRE

## (undated) DEVICE — INFECTION CONTROL KIT SYS

## (undated) DEVICE — NEEDLE HYPO 18GA L1.5IN PNK S STL HUB POLYPR SHLD REG BVL

## (undated) DEVICE — SYR LR LCK 1ML GRAD NSAF 30ML --

## (undated) DEVICE — MEDI-VAC YANK SUCT HNDL W/TPRD BULBOUS TIP: Brand: CARDINAL HEALTH

## (undated) DEVICE — BASIN EMSIS 16OZ GRAPHITE PLAS KID SHP MOLD GRAD FOR ORAL

## (undated) DEVICE — 3.2MM GUIDE WIRE 400MM

## (undated) DEVICE — BRUSH SCRB 4% CHG RED DISP --

## (undated) DEVICE — CATH IV AUTOGRD BC PNK 20GA 25 -- INSYTE

## (undated) DEVICE — 6619 2 PTNT ISO SYS INCISE AREA&LT;(&GT;&&LT;)&GT;P: Brand: STERI-DRAPE™ IOBAN™ 2

## (undated) DEVICE — PACK,BASIC,SIRUS,V: Brand: MEDLINE

## (undated) DEVICE — SOL IRR STRL H2O 1000ML BTL --

## (undated) DEVICE — COVER,TABLE,60X90,STERILE: Brand: MEDLINE

## (undated) DEVICE — MEDI-VAC YANK SUCT HNDL W/TPRD BULBOUS TIP & NON-CONDUCTIVE: Brand: CARDINAL HEALTH

## (undated) DEVICE — 1200 GUARD II KIT W/5MM TUBE W/O VAC TUBE: Brand: GUARDIAN

## (undated) DEVICE — SOLIDIFIER MEDC 1200ML -- CONVERT TO 356117

## (undated) DEVICE — BLOCK BITE ENDOSCP AD 21 MM W/ DIL BLU LF DISP

## (undated) DEVICE — SOLUTION SURG PREP 26 CC PURPREP

## (undated) DEVICE — KIT POS FOAM HANA TBL

## (undated) DEVICE — GARMENT,MEDLINE,DVT,INT,CALF,MED, GEN2: Brand: MEDLINE

## (undated) DEVICE — ROD RMR L950MM DIA2.5MM W/ EXTN BALL TIP

## (undated) DEVICE — SET ADMIN 16ML TBNG L100IN 2 Y INJ SITE IV PIGGY BK DISP

## (undated) DEVICE — NEONATAL-ADULT SPO2 SENSOR: Brand: NELLCOR

## (undated) DEVICE — SPONGE GZ W4XL4IN COT 12 PLY TYP VII WVN C FLD DSGN

## (undated) DEVICE — FORCEPS BX L160CM DIA8MM GRSP DISECT CUP TIP NONLOCKING ROT

## (undated) DEVICE — SUTURE VCRL SZ 2-0 L36IN ABSRB UD L36MM CT-1 1/2 CIR J945H

## (undated) DEVICE — CONTAINER SPEC 20 ML LID NEUT BUFF FORMALIN 10 % POLYPR STS

## (undated) DEVICE — SYR 10ML LUER LOK 1/5ML GRAD --

## (undated) DEVICE — STERILE POLYISOPRENE POWDER-FREE SURGICAL GLOVES: Brand: PROTEXIS

## (undated) DEVICE — TOWEL 4 PLY TISS 19X30 SUE WHT

## (undated) DEVICE — KENDALL RADIOLUCENT FOAM MONITORING ELECTRODE RECTANGULAR SHAPE: Brand: KENDALL

## (undated) DEVICE — Z DISCONTINUED PER MEDLINE LINE GAS SAMPLING O2/CO2 LNG AD 13 FT NSL W/ TBNG FILTERLINE

## (undated) DEVICE — SOL IRR SOD CL 0.9% 1000ML BTL --

## (undated) DEVICE — SURGICAL PROCEDURE PACK BASIN MAJ SET CUST NO CAUT